# Patient Record
Sex: MALE | Race: BLACK OR AFRICAN AMERICAN | NOT HISPANIC OR LATINO | Employment: OTHER | RURAL
[De-identification: names, ages, dates, MRNs, and addresses within clinical notes are randomized per-mention and may not be internally consistent; named-entity substitution may affect disease eponyms.]

---

## 2020-07-02 ENCOUNTER — HISTORICAL (OUTPATIENT)
Dept: ADMINISTRATIVE | Facility: HOSPITAL | Age: 70
End: 2020-07-02

## 2021-06-25 VITALS — HEIGHT: 71 IN | WEIGHT: 182 LBS | BODY MASS INDEX: 25.48 KG/M2

## 2021-06-25 RX ORDER — CLOPIDOGREL BISULFATE 75 MG/1
75 TABLET ORAL DAILY
COMMUNITY
End: 2023-02-15 | Stop reason: SDUPTHER

## 2021-06-25 RX ORDER — ATORVASTATIN CALCIUM 40 MG/1
40 TABLET, FILM COATED ORAL DAILY
COMMUNITY
End: 2022-02-01

## 2021-06-25 RX ORDER — IBUPROFEN 100 MG/5ML
1000 SUSPENSION, ORAL (FINAL DOSE FORM) ORAL DAILY
COMMUNITY

## 2021-06-25 RX ORDER — METOPROLOL SUCCINATE 100 MG/1
100 TABLET, EXTENDED RELEASE ORAL DAILY
COMMUNITY
End: 2022-02-01

## 2021-06-25 RX ORDER — CILOSTAZOL 100 MG/1
100 TABLET ORAL 2 TIMES DAILY
COMMUNITY

## 2021-06-25 RX ORDER — FUROSEMIDE 40 MG/1
40 TABLET ORAL DAILY
COMMUNITY

## 2021-06-25 RX ORDER — HYDRALAZINE HYDROCHLORIDE 50 MG/1
50 TABLET, FILM COATED ORAL 3 TIMES DAILY
COMMUNITY
End: 2023-02-15 | Stop reason: SDUPTHER

## 2021-06-25 RX ORDER — LOSARTAN POTASSIUM 50 MG/1
100 TABLET ORAL DAILY
COMMUNITY
End: 2023-02-15 | Stop reason: SDUPTHER

## 2021-06-25 RX ORDER — ASPIRIN 81 MG/1
81 TABLET ORAL DAILY
COMMUNITY

## 2021-09-08 ENCOUNTER — APPOINTMENT (OUTPATIENT)
Dept: RADIOLOGY | Facility: CLINIC | Age: 71
End: 2021-09-08
Attending: INTERNAL MEDICINE
Payer: MEDICARE

## 2021-09-08 ENCOUNTER — OFFICE VISIT (OUTPATIENT)
Dept: FAMILY MEDICINE | Facility: CLINIC | Age: 71
End: 2021-09-08
Payer: MEDICARE

## 2021-09-08 VITALS
SYSTOLIC BLOOD PRESSURE: 192 MMHG | HEART RATE: 63 BPM | WEIGHT: 193 LBS | HEIGHT: 71 IN | BODY MASS INDEX: 27.02 KG/M2 | RESPIRATION RATE: 20 BRPM | OXYGEN SATURATION: 98 % | DIASTOLIC BLOOD PRESSURE: 74 MMHG

## 2021-09-08 DIAGNOSIS — M25.559 HIP PAIN: Primary | ICD-10-CM

## 2021-09-08 DIAGNOSIS — I10 HYPERTENSION, UNSPECIFIED TYPE: ICD-10-CM

## 2021-09-08 DIAGNOSIS — I49.9 CARDIAC ARRHYTHMIA, UNSPECIFIED CARDIAC ARRHYTHMIA TYPE: ICD-10-CM

## 2021-09-08 DIAGNOSIS — R10.9 ABDOMINAL PAIN, UNSPECIFIED ABDOMINAL LOCATION: ICD-10-CM

## 2021-09-08 LAB
ANION GAP SERPL CALCULATED.3IONS-SCNC: 11 MMOL/L (ref 7–16)
BASOPHILS # BLD AUTO: 0.03 K/UL (ref 0–0.2)
BASOPHILS NFR BLD AUTO: 0.5 % (ref 0–1)
BUN SERPL-MCNC: 21 MG/DL (ref 7–18)
BUN/CREAT SERPL: 12 (ref 6–20)
CALCIUM SERPL-MCNC: 9.6 MG/DL (ref 8.5–10.1)
CHLORIDE SERPL-SCNC: 105 MMOL/L (ref 98–107)
CO2 SERPL-SCNC: 27 MMOL/L (ref 21–32)
CREAT SERPL-MCNC: 1.78 MG/DL (ref 0.7–1.3)
DIFFERENTIAL METHOD BLD: ABNORMAL
EOSINOPHIL # BLD AUTO: 0.05 K/UL (ref 0–0.5)
EOSINOPHIL NFR BLD AUTO: 0.8 % (ref 1–4)
ERYTHROCYTE [DISTWIDTH] IN BLOOD BY AUTOMATED COUNT: 16.2 % (ref 11.5–14.5)
GLUCOSE SERPL-MCNC: 113 MG/DL (ref 74–106)
HCT VFR BLD AUTO: 36.6 % (ref 40–54)
HGB BLD-MCNC: 12.4 G/DL (ref 13.5–18)
IMM GRANULOCYTES # BLD AUTO: 0.02 K/UL (ref 0–0.04)
IMM GRANULOCYTES NFR BLD: 0.3 % (ref 0–0.4)
LYMPHOCYTES # BLD AUTO: 2.06 K/UL (ref 1–4.8)
LYMPHOCYTES NFR BLD AUTO: 34.9 % (ref 27–41)
MCH RBC QN AUTO: 26.5 PG (ref 27–31)
MCHC RBC AUTO-ENTMCNC: 33.9 G/DL (ref 32–36)
MCV RBC AUTO: 78.2 FL (ref 80–96)
MONOCYTES # BLD AUTO: 0.46 K/UL (ref 0–0.8)
MONOCYTES NFR BLD AUTO: 7.8 % (ref 2–6)
MPC BLD CALC-MCNC: 11.2 FL (ref 9.4–12.4)
NEUTROPHILS # BLD AUTO: 3.29 K/UL (ref 1.8–7.7)
NEUTROPHILS NFR BLD AUTO: 55.7 % (ref 53–65)
NRBC # BLD AUTO: 0 X10E3/UL
NRBC, AUTO (.00): 0 %
PLATELET # BLD AUTO: 299 K/UL (ref 150–400)
POTASSIUM SERPL-SCNC: 3.6 MMOL/L (ref 3.5–5.1)
RBC # BLD AUTO: 4.68 M/UL (ref 4.6–6.2)
SODIUM SERPL-SCNC: 139 MMOL/L (ref 136–145)
WBC # BLD AUTO: 5.91 K/UL (ref 4.5–11)

## 2021-09-08 PROCEDURE — 85025 CBC WITH DIFFERENTIAL: ICD-10-PCS | Mod: ,,, | Performed by: CLINICAL MEDICAL LABORATORY

## 2021-09-08 PROCEDURE — 3078F DIAST BP <80 MM HG: CPT | Mod: ,,, | Performed by: INTERNAL MEDICINE

## 2021-09-08 PROCEDURE — 99214 OFFICE O/P EST MOD 30 MIN: CPT | Mod: ,,, | Performed by: INTERNAL MEDICINE

## 2021-09-08 PROCEDURE — 4010F ACE/ARB THERAPY RXD/TAKEN: CPT | Mod: ,,, | Performed by: INTERNAL MEDICINE

## 2021-09-08 PROCEDURE — 3288F PR FALLS RISK ASSESSMENT DOCUMENTED: ICD-10-PCS | Mod: ,,, | Performed by: INTERNAL MEDICINE

## 2021-09-08 PROCEDURE — 80048 BASIC METABOLIC PNL TOTAL CA: CPT | Mod: ,,, | Performed by: CLINICAL MEDICAL LABORATORY

## 2021-09-08 PROCEDURE — 3077F PR MOST RECENT SYSTOLIC BLOOD PRESSURE >= 140 MM HG: ICD-10-PCS | Mod: ,,, | Performed by: INTERNAL MEDICINE

## 2021-09-08 PROCEDURE — 71046 X-RAY EXAM CHEST 2 VIEWS: CPT | Mod: TC,RHCUB | Performed by: INTERNAL MEDICINE

## 2021-09-08 PROCEDURE — 1101F PT FALLS ASSESS-DOCD LE1/YR: CPT | Mod: ,,, | Performed by: INTERNAL MEDICINE

## 2021-09-08 PROCEDURE — 1101F PR PT FALLS ASSESS DOC 0-1 FALLS W/OUT INJ PAST YR: ICD-10-PCS | Mod: ,,, | Performed by: INTERNAL MEDICINE

## 2021-09-08 PROCEDURE — 1159F PR MEDICATION LIST DOCUMENTED IN MEDICAL RECORD: ICD-10-PCS | Mod: ,,, | Performed by: INTERNAL MEDICINE

## 2021-09-08 PROCEDURE — 4010F PR ACE/ARB THEARPY RXD/TAKEN: ICD-10-PCS | Mod: ,,, | Performed by: INTERNAL MEDICINE

## 2021-09-08 PROCEDURE — 99214 PR OFFICE/OUTPT VISIT, EST, LEVL IV, 30-39 MIN: ICD-10-PCS | Mod: ,,, | Performed by: INTERNAL MEDICINE

## 2021-09-08 PROCEDURE — 3077F SYST BP >= 140 MM HG: CPT | Mod: ,,, | Performed by: INTERNAL MEDICINE

## 2021-09-08 PROCEDURE — 3288F FALL RISK ASSESSMENT DOCD: CPT | Mod: ,,, | Performed by: INTERNAL MEDICINE

## 2021-09-08 PROCEDURE — 1159F MED LIST DOCD IN RCRD: CPT | Mod: ,,, | Performed by: INTERNAL MEDICINE

## 2021-09-08 PROCEDURE — 80048 BASIC METABOLIC PANEL: ICD-10-PCS | Mod: ,,, | Performed by: CLINICAL MEDICAL LABORATORY

## 2021-09-08 PROCEDURE — 3078F PR MOST RECENT DIASTOLIC BLOOD PRESSURE < 80 MM HG: ICD-10-PCS | Mod: ,,, | Performed by: INTERNAL MEDICINE

## 2021-09-08 PROCEDURE — 3008F PR BODY MASS INDEX (BMI) DOCUMENTED: ICD-10-PCS | Mod: ,,, | Performed by: INTERNAL MEDICINE

## 2021-09-08 PROCEDURE — 3008F BODY MASS INDEX DOCD: CPT | Mod: ,,, | Performed by: INTERNAL MEDICINE

## 2021-09-08 PROCEDURE — 85025 COMPLETE CBC W/AUTO DIFF WBC: CPT | Mod: ,,, | Performed by: CLINICAL MEDICAL LABORATORY

## 2021-09-08 RX ORDER — AMLODIPINE BESYLATE 10 MG/1
10 TABLET ORAL DAILY
Qty: 30 TABLET | Refills: 3 | Status: SHIPPED | OUTPATIENT
Start: 2021-09-08 | End: 2022-01-20

## 2021-09-15 ENCOUNTER — OFFICE VISIT (OUTPATIENT)
Dept: FAMILY MEDICINE | Facility: CLINIC | Age: 71
End: 2021-09-15
Payer: MEDICARE

## 2021-09-15 VITALS
BODY MASS INDEX: 27.3 KG/M2 | RESPIRATION RATE: 20 BRPM | WEIGHT: 195 LBS | SYSTOLIC BLOOD PRESSURE: 151 MMHG | HEIGHT: 71 IN | DIASTOLIC BLOOD PRESSURE: 68 MMHG | OXYGEN SATURATION: 96 % | HEART RATE: 65 BPM

## 2021-09-15 DIAGNOSIS — N18.9 CHRONIC KIDNEY DISEASE, UNSPECIFIED CKD STAGE: ICD-10-CM

## 2021-09-15 DIAGNOSIS — M16.0 OSTEOARTHRITIS OF BOTH HIPS, UNSPECIFIED OSTEOARTHRITIS TYPE: Primary | ICD-10-CM

## 2021-09-15 DIAGNOSIS — Z12.11 ENCOUNTER FOR SCREENING COLONOSCOPY: ICD-10-CM

## 2021-09-15 DIAGNOSIS — J44.9 CHRONIC OBSTRUCTIVE PULMONARY DISEASE, UNSPECIFIED COPD TYPE: ICD-10-CM

## 2021-09-15 DIAGNOSIS — R89.9 ABNORMAL LABORATORY TEST RESULT: ICD-10-CM

## 2021-09-15 DIAGNOSIS — Z12.5 SCREENING FOR MALIGNANT NEOPLASM OF PROSTATE: ICD-10-CM

## 2021-09-15 LAB
FERRITIN SERPL-MCNC: 23 NG/ML (ref 26–388)
IMM RETICS NFR: 19.2 % (ref 2.3–13.4)
IRON SATN MFR SERPL: 21 % (ref 14–50)
IRON SERPL-MCNC: 64 ΜG/DL (ref 65–175)
PSA SERPL-MCNC: 1.94 NG/ML (ref 0–4.4)
RETICS # AUTO: 0.08 X10E6/UL (ref 0.02–0.11)
RETICS/RBC NFR AUTO: 1.9 % (ref 0.4–2.2)
TIBC SERPL-MCNC: 312 ΜG/DL (ref 250–450)

## 2021-09-15 PROCEDURE — 83540 IRON AND TIBC: ICD-10-PCS | Mod: ,,, | Performed by: CLINICAL MEDICAL LABORATORY

## 2021-09-15 PROCEDURE — 82728 FERRITIN: ICD-10-PCS | Mod: ,,, | Performed by: CLINICAL MEDICAL LABORATORY

## 2021-09-15 PROCEDURE — G0103 PSA, SCREENING: ICD-10-PCS | Mod: ,,, | Performed by: CLINICAL MEDICAL LABORATORY

## 2021-09-15 PROCEDURE — 3288F FALL RISK ASSESSMENT DOCD: CPT | Mod: ,,, | Performed by: INTERNAL MEDICINE

## 2021-09-15 PROCEDURE — 4010F ACE/ARB THERAPY RXD/TAKEN: CPT | Mod: ,,, | Performed by: INTERNAL MEDICINE

## 2021-09-15 PROCEDURE — 82728 ASSAY OF FERRITIN: CPT | Mod: ,,, | Performed by: CLINICAL MEDICAL LABORATORY

## 2021-09-15 PROCEDURE — 1159F MED LIST DOCD IN RCRD: CPT | Mod: ,,, | Performed by: INTERNAL MEDICINE

## 2021-09-15 PROCEDURE — 83550 IRON BINDING TEST: CPT | Mod: ,,, | Performed by: CLINICAL MEDICAL LABORATORY

## 2021-09-15 PROCEDURE — 1159F PR MEDICATION LIST DOCUMENTED IN MEDICAL RECORD: ICD-10-PCS | Mod: ,,, | Performed by: INTERNAL MEDICINE

## 2021-09-15 PROCEDURE — 85045 RETICULOCYTES: ICD-10-PCS | Mod: ,,, | Performed by: CLINICAL MEDICAL LABORATORY

## 2021-09-15 PROCEDURE — 3008F PR BODY MASS INDEX (BMI) DOCUMENTED: ICD-10-PCS | Mod: ,,, | Performed by: INTERNAL MEDICINE

## 2021-09-15 PROCEDURE — 83540 ASSAY OF IRON: CPT | Mod: ,,, | Performed by: CLINICAL MEDICAL LABORATORY

## 2021-09-15 PROCEDURE — 1101F PR PT FALLS ASSESS DOC 0-1 FALLS W/OUT INJ PAST YR: ICD-10-PCS | Mod: ,,, | Performed by: INTERNAL MEDICINE

## 2021-09-15 PROCEDURE — 3078F DIAST BP <80 MM HG: CPT | Mod: ,,, | Performed by: INTERNAL MEDICINE

## 2021-09-15 PROCEDURE — 3077F PR MOST RECENT SYSTOLIC BLOOD PRESSURE >= 140 MM HG: ICD-10-PCS | Mod: ,,, | Performed by: INTERNAL MEDICINE

## 2021-09-15 PROCEDURE — 4010F PR ACE/ARB THEARPY RXD/TAKEN: ICD-10-PCS | Mod: ,,, | Performed by: INTERNAL MEDICINE

## 2021-09-15 PROCEDURE — 3078F PR MOST RECENT DIASTOLIC BLOOD PRESSURE < 80 MM HG: ICD-10-PCS | Mod: ,,, | Performed by: INTERNAL MEDICINE

## 2021-09-15 PROCEDURE — 85045 AUTOMATED RETICULOCYTE COUNT: CPT | Mod: ,,, | Performed by: CLINICAL MEDICAL LABORATORY

## 2021-09-15 PROCEDURE — 3077F SYST BP >= 140 MM HG: CPT | Mod: ,,, | Performed by: INTERNAL MEDICINE

## 2021-09-15 PROCEDURE — G0103 PSA SCREENING: HCPCS | Mod: ,,, | Performed by: CLINICAL MEDICAL LABORATORY

## 2021-09-15 PROCEDURE — 1101F PT FALLS ASSESS-DOCD LE1/YR: CPT | Mod: ,,, | Performed by: INTERNAL MEDICINE

## 2021-09-15 PROCEDURE — 1160F PR REVIEW ALL MEDS BY PRESCRIBER/CLIN PHARMACIST DOCUMENTED: ICD-10-PCS | Mod: ,,, | Performed by: INTERNAL MEDICINE

## 2021-09-15 PROCEDURE — 1160F RVW MEDS BY RX/DR IN RCRD: CPT | Mod: ,,, | Performed by: INTERNAL MEDICINE

## 2021-09-15 PROCEDURE — 3008F BODY MASS INDEX DOCD: CPT | Mod: ,,, | Performed by: INTERNAL MEDICINE

## 2021-09-15 PROCEDURE — 99214 PR OFFICE/OUTPT VISIT, EST, LEVL IV, 30-39 MIN: ICD-10-PCS | Mod: ,,, | Performed by: INTERNAL MEDICINE

## 2021-09-15 PROCEDURE — 3288F PR FALLS RISK ASSESSMENT DOCUMENTED: ICD-10-PCS | Mod: ,,, | Performed by: INTERNAL MEDICINE

## 2021-09-15 PROCEDURE — 99214 OFFICE O/P EST MOD 30 MIN: CPT | Mod: ,,, | Performed by: INTERNAL MEDICINE

## 2021-09-15 PROCEDURE — 83550 IRON AND TIBC: ICD-10-PCS | Mod: ,,, | Performed by: CLINICAL MEDICAL LABORATORY

## 2021-09-22 ENCOUNTER — CLINICAL SUPPORT (OUTPATIENT)
Dept: PULMONOLOGY | Facility: HOSPITAL | Age: 71
End: 2021-09-22
Attending: INTERNAL MEDICINE
Payer: MEDICARE

## 2021-09-22 DIAGNOSIS — J44.9 CHRONIC OBSTRUCTIVE PULMONARY DISEASE, UNSPECIFIED COPD TYPE: ICD-10-CM

## 2021-09-22 LAB
OCCULT BLOOD, SPECIMEN 2: POSITIVE
OCCULT BLOOD, SPECIMEN 3: POSITIVE
OCCULT BLOOD: NEGATIVE

## 2021-09-22 PROCEDURE — 94726 PULM FUNCT TST PLETHYSMOGRAP: ICD-10-PCS | Mod: 26,,, | Performed by: INTERNAL MEDICINE

## 2021-09-22 PROCEDURE — 94060 EVALUATION OF WHEEZING: CPT

## 2021-09-22 PROCEDURE — 94729 DIFFUSING CAPACITY: CPT

## 2021-09-22 PROCEDURE — 94729 DIFFUSING CAPACITY: CPT | Mod: 26,,, | Performed by: INTERNAL MEDICINE

## 2021-09-22 PROCEDURE — 94060 PR EVAL OF BRONCHOSPASM: ICD-10-PCS | Mod: 26,,, | Performed by: INTERNAL MEDICINE

## 2021-09-22 PROCEDURE — 94060 EVALUATION OF WHEEZING: CPT | Mod: 26,,, | Performed by: INTERNAL MEDICINE

## 2021-09-22 PROCEDURE — 94726 PLETHYSMOGRAPHY LUNG VOLUMES: CPT

## 2021-09-22 PROCEDURE — 82271 OCCULT BLOOD X 3, STOOL: ICD-10-PCS | Mod: ,,, | Performed by: CLINICAL MEDICAL LABORATORY

## 2021-09-22 PROCEDURE — 94726 PLETHYSMOGRAPHY LUNG VOLUMES: CPT | Mod: 26,,, | Performed by: INTERNAL MEDICINE

## 2021-09-22 PROCEDURE — 82271 OCCULT BLOOD OTHER SOURCES: CPT | Mod: ,,, | Performed by: CLINICAL MEDICAL LABORATORY

## 2021-09-22 PROCEDURE — 94729 PR C02/MEMBANE DIFFUSE CAPACITY: ICD-10-PCS | Mod: 26,,, | Performed by: INTERNAL MEDICINE

## 2021-09-30 PROBLEM — M47.819 ARTHRITIS OF LOW BACK: Status: ACTIVE | Noted: 2021-09-30

## 2021-09-30 PROBLEM — M16.0 OSTEOARTHRITIS OF BOTH HIPS: Status: ACTIVE | Noted: 2021-09-30

## 2021-10-04 ENCOUNTER — OFFICE VISIT (OUTPATIENT)
Dept: FAMILY MEDICINE | Facility: CLINIC | Age: 71
End: 2021-10-04
Payer: MEDICARE

## 2021-10-04 VITALS
HEIGHT: 73 IN | RESPIRATION RATE: 19 BRPM | HEART RATE: 68 BPM | DIASTOLIC BLOOD PRESSURE: 68 MMHG | BODY MASS INDEX: 25.27 KG/M2 | OXYGEN SATURATION: 98 % | WEIGHT: 190.63 LBS | SYSTOLIC BLOOD PRESSURE: 159 MMHG

## 2021-10-04 DIAGNOSIS — Z23 ENCOUNTER FOR IMMUNIZATION: Primary | ICD-10-CM

## 2021-10-04 DIAGNOSIS — K92.2 GASTROINTESTINAL HEMORRHAGE, UNSPECIFIED GASTROINTESTINAL HEMORRHAGE TYPE: ICD-10-CM

## 2021-10-04 DIAGNOSIS — N45.1 EPIDIDYMITIS: Primary | ICD-10-CM

## 2021-10-04 LAB
BACTERIA #/AREA URNS HPF: ABNORMAL /HPF
BILIRUB UR QL STRIP: NEGATIVE
CLARITY UR: CLEAR
COLOR UR: YELLOW
GLUCOSE UR STRIP-MCNC: NEGATIVE MG/DL
KETONES UR STRIP-SCNC: NEGATIVE MG/DL
LEUKOCYTE ESTERASE UR QL STRIP: ABNORMAL
NITRITE UR QL STRIP: NEGATIVE
PH UR STRIP: 6.5 PH UNITS
PROT UR QL STRIP: NEGATIVE
RBC # UR STRIP: NEGATIVE /UL
RBC #/AREA URNS HPF: ABNORMAL /HPF
SP GR UR STRIP: 1.01
SQUAMOUS #/AREA URNS LPF: ABNORMAL /LPF
UROBILINOGEN UR STRIP-ACNC: 0.2 MG/DL
WBC #/AREA URNS HPF: ABNORMAL /HPF
WBC CLUMPS, UA: ABNORMAL /HPF

## 2021-10-04 PROCEDURE — 1101F PT FALLS ASSESS-DOCD LE1/YR: CPT | Mod: ,,, | Performed by: INTERNAL MEDICINE

## 2021-10-04 PROCEDURE — 3288F FALL RISK ASSESSMENT DOCD: CPT | Mod: ,,, | Performed by: INTERNAL MEDICINE

## 2021-10-04 PROCEDURE — 3288F PR FALLS RISK ASSESSMENT DOCUMENTED: ICD-10-PCS | Mod: ,,, | Performed by: INTERNAL MEDICINE

## 2021-10-04 PROCEDURE — 1160F RVW MEDS BY RX/DR IN RCRD: CPT | Mod: ,,, | Performed by: INTERNAL MEDICINE

## 2021-10-04 PROCEDURE — 3077F PR MOST RECENT SYSTOLIC BLOOD PRESSURE >= 140 MM HG: ICD-10-PCS | Mod: ,,, | Performed by: INTERNAL MEDICINE

## 2021-10-04 PROCEDURE — 1159F PR MEDICATION LIST DOCUMENTED IN MEDICAL RECORD: ICD-10-PCS | Mod: ,,, | Performed by: INTERNAL MEDICINE

## 2021-10-04 PROCEDURE — 96372 THER/PROPH/DIAG INJ SC/IM: CPT | Mod: ,,, | Performed by: INTERNAL MEDICINE

## 2021-10-04 PROCEDURE — 1159F MED LIST DOCD IN RCRD: CPT | Mod: ,,, | Performed by: INTERNAL MEDICINE

## 2021-10-04 PROCEDURE — 99214 PR OFFICE/OUTPT VISIT, EST, LEVL IV, 30-39 MIN: ICD-10-PCS | Mod: 25,,, | Performed by: INTERNAL MEDICINE

## 2021-10-04 PROCEDURE — 3078F PR MOST RECENT DIASTOLIC BLOOD PRESSURE < 80 MM HG: ICD-10-PCS | Mod: ,,, | Performed by: INTERNAL MEDICINE

## 2021-10-04 PROCEDURE — 3077F SYST BP >= 140 MM HG: CPT | Mod: ,,, | Performed by: INTERNAL MEDICINE

## 2021-10-04 PROCEDURE — 81001 URINALYSIS, REFLEX TO URINE CULTURE: ICD-10-PCS | Mod: ,,, | Performed by: CLINICAL MEDICAL LABORATORY

## 2021-10-04 PROCEDURE — 90686 IIV4 VACC NO PRSV 0.5 ML IM: CPT | Mod: ,,, | Performed by: INTERNAL MEDICINE

## 2021-10-04 PROCEDURE — 81001 URINALYSIS AUTO W/SCOPE: CPT | Mod: ,,, | Performed by: CLINICAL MEDICAL LABORATORY

## 2021-10-04 PROCEDURE — 96372 PR INJECTION,THERAP/PROPH/DIAG2ST, IM OR SUBCUT: ICD-10-PCS | Mod: ,,, | Performed by: INTERNAL MEDICINE

## 2021-10-04 PROCEDURE — 1101F PR PT FALLS ASSESS DOC 0-1 FALLS W/OUT INJ PAST YR: ICD-10-PCS | Mod: ,,, | Performed by: INTERNAL MEDICINE

## 2021-10-04 PROCEDURE — 90686 FLU VACCINE (QUAD) GREATER THAN OR EQUAL TO 3YO PRESERVATIVE FREE IM: ICD-10-PCS | Mod: ,,, | Performed by: INTERNAL MEDICINE

## 2021-10-04 PROCEDURE — 4010F PR ACE/ARB THEARPY RXD/TAKEN: ICD-10-PCS | Mod: ,,, | Performed by: INTERNAL MEDICINE

## 2021-10-04 PROCEDURE — 4010F ACE/ARB THERAPY RXD/TAKEN: CPT | Mod: ,,, | Performed by: INTERNAL MEDICINE

## 2021-10-04 PROCEDURE — 3008F BODY MASS INDEX DOCD: CPT | Mod: ,,, | Performed by: INTERNAL MEDICINE

## 2021-10-04 PROCEDURE — 1160F PR REVIEW ALL MEDS BY PRESCRIBER/CLIN PHARMACIST DOCUMENTED: ICD-10-PCS | Mod: ,,, | Performed by: INTERNAL MEDICINE

## 2021-10-04 PROCEDURE — G0008 FLU VACCINE (QUAD) GREATER THAN OR EQUAL TO 3YO PRESERVATIVE FREE IM: ICD-10-PCS | Mod: ,,, | Performed by: INTERNAL MEDICINE

## 2021-10-04 PROCEDURE — G0008 ADMIN INFLUENZA VIRUS VAC: HCPCS | Mod: ,,, | Performed by: INTERNAL MEDICINE

## 2021-10-04 PROCEDURE — 3008F PR BODY MASS INDEX (BMI) DOCUMENTED: ICD-10-PCS | Mod: ,,, | Performed by: INTERNAL MEDICINE

## 2021-10-04 PROCEDURE — 99214 OFFICE O/P EST MOD 30 MIN: CPT | Mod: 25,,, | Performed by: INTERNAL MEDICINE

## 2021-10-04 PROCEDURE — 3078F DIAST BP <80 MM HG: CPT | Mod: ,,, | Performed by: INTERNAL MEDICINE

## 2021-10-04 RX ORDER — CEFTRIAXONE 1 G/1
1 INJECTION, POWDER, FOR SOLUTION INTRAMUSCULAR; INTRAVENOUS
Status: COMPLETED | OUTPATIENT
Start: 2021-10-04 | End: 2021-10-04

## 2021-10-04 RX ORDER — SULFAMETHOXAZOLE AND TRIMETHOPRIM 800; 160 MG/1; MG/1
1 TABLET ORAL 2 TIMES DAILY
Qty: 20 TABLET | Refills: 1 | Status: SHIPPED | OUTPATIENT
Start: 2021-10-04 | End: 2021-11-29

## 2021-10-04 RX ORDER — NAPROXEN 500 MG/1
500 TABLET ORAL 2 TIMES DAILY PRN
Qty: 20 TABLET | Refills: 1 | Status: SHIPPED | OUTPATIENT
Start: 2021-10-04 | End: 2022-12-20

## 2021-10-04 RX ADMIN — CEFTRIAXONE 1 G: 1 INJECTION, POWDER, FOR SOLUTION INTRAMUSCULAR; INTRAVENOUS at 09:10

## 2021-10-06 ENCOUNTER — HOSPITAL ENCOUNTER (OUTPATIENT)
Dept: RADIOLOGY | Facility: HOSPITAL | Age: 71
Discharge: HOME | End: 2021-10-06
Attending: INTERNAL MEDICINE
Payer: MEDICARE

## 2021-10-06 ENCOUNTER — TELEPHONE (OUTPATIENT)
Dept: FAMILY MEDICINE | Facility: CLINIC | Age: 71
End: 2021-10-06

## 2021-10-06 ENCOUNTER — HOSPITAL ENCOUNTER (OUTPATIENT)
Dept: RADIOLOGY | Facility: HOSPITAL | Age: 71
Discharge: HOME | End: 2021-10-06
Attending: ORTHOPAEDIC SURGERY
Payer: MEDICARE

## 2021-10-06 DIAGNOSIS — N45.1 EPIDIDYMITIS: ICD-10-CM

## 2021-10-06 DIAGNOSIS — M54.9 DORSALGIA, UNSPECIFIED: ICD-10-CM

## 2021-10-06 DIAGNOSIS — I73.9 VASCULAR DISEASE, PERIPHERAL: ICD-10-CM

## 2021-10-06 PROCEDURE — 93925 US LOWER EXTREMITY ARTERIES BILATERAL: ICD-10-PCS | Mod: 26,,,

## 2021-10-06 PROCEDURE — 76870 US EXAM SCROTUM: CPT | Mod: 26,,,

## 2021-10-06 PROCEDURE — 76870 US SCROTUM AND TESTICLES: ICD-10-PCS | Mod: 26,,,

## 2021-10-06 PROCEDURE — 72148 MRI LUMBAR SPINE W/O DYE: CPT | Mod: 26,,,

## 2021-10-06 PROCEDURE — 93925 LOWER EXTREMITY STUDY: CPT | Mod: TC

## 2021-10-06 PROCEDURE — 72148 MRI LUMBAR SPINE W/O DYE: CPT | Mod: TC

## 2021-10-06 PROCEDURE — 76870 US EXAM SCROTUM: CPT | Mod: TC

## 2021-10-06 PROCEDURE — 72148 MRI LUMBAR SPINE WITHOUT CONTRAST: ICD-10-PCS | Mod: 26,,,

## 2021-10-06 PROCEDURE — 93925 LOWER EXTREMITY STUDY: CPT | Mod: 26,,,

## 2021-10-20 ENCOUNTER — OFFICE VISIT (OUTPATIENT)
Dept: FAMILY MEDICINE | Facility: CLINIC | Age: 71
End: 2021-10-20
Payer: MEDICARE

## 2021-10-20 VITALS
SYSTOLIC BLOOD PRESSURE: 153 MMHG | DIASTOLIC BLOOD PRESSURE: 67 MMHG | HEIGHT: 71 IN | RESPIRATION RATE: 20 BRPM | WEIGHT: 193.63 LBS | BODY MASS INDEX: 27.11 KG/M2 | HEART RATE: 65 BPM | OXYGEN SATURATION: 98 %

## 2021-10-20 DIAGNOSIS — N50.3 EPIDIDYMAL CYST: Primary | ICD-10-CM

## 2021-10-20 DIAGNOSIS — M16.0 OSTEOARTHRITIS OF BOTH HIPS, UNSPECIFIED OSTEOARTHRITIS TYPE: Chronic | ICD-10-CM

## 2021-10-20 DIAGNOSIS — N43.3 HYDROCELE OF TESTIS: ICD-10-CM

## 2021-10-20 PROCEDURE — 3078F DIAST BP <80 MM HG: CPT | Mod: ,,, | Performed by: INTERNAL MEDICINE

## 2021-10-20 PROCEDURE — 1160F PR REVIEW ALL MEDS BY PRESCRIBER/CLIN PHARMACIST DOCUMENTED: ICD-10-PCS | Mod: ,,, | Performed by: INTERNAL MEDICINE

## 2021-10-20 PROCEDURE — 4010F PR ACE/ARB THEARPY RXD/TAKEN: ICD-10-PCS | Mod: ,,, | Performed by: INTERNAL MEDICINE

## 2021-10-20 PROCEDURE — 3288F PR FALLS RISK ASSESSMENT DOCUMENTED: ICD-10-PCS | Mod: ,,, | Performed by: INTERNAL MEDICINE

## 2021-10-20 PROCEDURE — 1101F PR PT FALLS ASSESS DOC 0-1 FALLS W/OUT INJ PAST YR: ICD-10-PCS | Mod: ,,, | Performed by: INTERNAL MEDICINE

## 2021-10-20 PROCEDURE — 3077F PR MOST RECENT SYSTOLIC BLOOD PRESSURE >= 140 MM HG: ICD-10-PCS | Mod: ,,, | Performed by: INTERNAL MEDICINE

## 2021-10-20 PROCEDURE — 1159F PR MEDICATION LIST DOCUMENTED IN MEDICAL RECORD: ICD-10-PCS | Mod: ,,, | Performed by: INTERNAL MEDICINE

## 2021-10-20 PROCEDURE — 1160F RVW MEDS BY RX/DR IN RCRD: CPT | Mod: ,,, | Performed by: INTERNAL MEDICINE

## 2021-10-20 PROCEDURE — 1101F PT FALLS ASSESS-DOCD LE1/YR: CPT | Mod: ,,, | Performed by: INTERNAL MEDICINE

## 2021-10-20 PROCEDURE — 3078F PR MOST RECENT DIASTOLIC BLOOD PRESSURE < 80 MM HG: ICD-10-PCS | Mod: ,,, | Performed by: INTERNAL MEDICINE

## 2021-10-20 PROCEDURE — 3288F FALL RISK ASSESSMENT DOCD: CPT | Mod: ,,, | Performed by: INTERNAL MEDICINE

## 2021-10-20 PROCEDURE — 99214 OFFICE O/P EST MOD 30 MIN: CPT | Mod: ,,, | Performed by: INTERNAL MEDICINE

## 2021-10-20 PROCEDURE — 3008F BODY MASS INDEX DOCD: CPT | Mod: ,,, | Performed by: INTERNAL MEDICINE

## 2021-10-20 PROCEDURE — 99214 PR OFFICE/OUTPT VISIT, EST, LEVL IV, 30-39 MIN: ICD-10-PCS | Mod: ,,, | Performed by: INTERNAL MEDICINE

## 2021-10-20 PROCEDURE — 3008F PR BODY MASS INDEX (BMI) DOCUMENTED: ICD-10-PCS | Mod: ,,, | Performed by: INTERNAL MEDICINE

## 2021-10-20 PROCEDURE — 4010F ACE/ARB THERAPY RXD/TAKEN: CPT | Mod: ,,, | Performed by: INTERNAL MEDICINE

## 2021-10-20 PROCEDURE — 3077F SYST BP >= 140 MM HG: CPT | Mod: ,,, | Performed by: INTERNAL MEDICINE

## 2021-10-20 PROCEDURE — 1159F MED LIST DOCD IN RCRD: CPT | Mod: ,,, | Performed by: INTERNAL MEDICINE

## 2021-10-26 DIAGNOSIS — Z12.11 COLON CANCER SCREENING: Primary | ICD-10-CM

## 2021-11-01 ENCOUNTER — OFFICE VISIT (OUTPATIENT)
Dept: FAMILY MEDICINE | Facility: CLINIC | Age: 71
End: 2021-11-01
Payer: MEDICARE

## 2021-11-01 VITALS
OXYGEN SATURATION: 97 % | DIASTOLIC BLOOD PRESSURE: 73 MMHG | SYSTOLIC BLOOD PRESSURE: 170 MMHG | BODY MASS INDEX: 26.74 KG/M2 | HEART RATE: 63 BPM | WEIGHT: 191 LBS | HEIGHT: 71 IN | RESPIRATION RATE: 19 BRPM

## 2021-11-01 DIAGNOSIS — I73.9 PVD (PERIPHERAL VASCULAR DISEASE): Primary | ICD-10-CM

## 2021-11-01 DIAGNOSIS — M51.27 PROTRUSION OF INTERVERTEBRAL DISC OF LUMBOSACRAL REGION: ICD-10-CM

## 2021-11-01 PROCEDURE — 3078F DIAST BP <80 MM HG: CPT | Mod: ,,, | Performed by: INTERNAL MEDICINE

## 2021-11-01 PROCEDURE — 1101F PT FALLS ASSESS-DOCD LE1/YR: CPT | Mod: ,,, | Performed by: INTERNAL MEDICINE

## 2021-11-01 PROCEDURE — 4010F PR ACE/ARB THEARPY RXD/TAKEN: ICD-10-PCS | Mod: ,,, | Performed by: INTERNAL MEDICINE

## 2021-11-01 PROCEDURE — 3077F PR MOST RECENT SYSTOLIC BLOOD PRESSURE >= 140 MM HG: ICD-10-PCS | Mod: ,,, | Performed by: INTERNAL MEDICINE

## 2021-11-01 PROCEDURE — 1159F PR MEDICATION LIST DOCUMENTED IN MEDICAL RECORD: ICD-10-PCS | Mod: ,,, | Performed by: INTERNAL MEDICINE

## 2021-11-01 PROCEDURE — 4010F ACE/ARB THERAPY RXD/TAKEN: CPT | Mod: ,,, | Performed by: INTERNAL MEDICINE

## 2021-11-01 PROCEDURE — 99214 OFFICE O/P EST MOD 30 MIN: CPT | Mod: ,,, | Performed by: INTERNAL MEDICINE

## 2021-11-01 PROCEDURE — 3008F PR BODY MASS INDEX (BMI) DOCUMENTED: ICD-10-PCS | Mod: ,,, | Performed by: INTERNAL MEDICINE

## 2021-11-01 PROCEDURE — 3008F BODY MASS INDEX DOCD: CPT | Mod: ,,, | Performed by: INTERNAL MEDICINE

## 2021-11-01 PROCEDURE — 3077F SYST BP >= 140 MM HG: CPT | Mod: ,,, | Performed by: INTERNAL MEDICINE

## 2021-11-01 PROCEDURE — 3078F PR MOST RECENT DIASTOLIC BLOOD PRESSURE < 80 MM HG: ICD-10-PCS | Mod: ,,, | Performed by: INTERNAL MEDICINE

## 2021-11-01 PROCEDURE — 3288F FALL RISK ASSESSMENT DOCD: CPT | Mod: ,,, | Performed by: INTERNAL MEDICINE

## 2021-11-01 PROCEDURE — 1160F RVW MEDS BY RX/DR IN RCRD: CPT | Mod: ,,, | Performed by: INTERNAL MEDICINE

## 2021-11-01 PROCEDURE — 1159F MED LIST DOCD IN RCRD: CPT | Mod: ,,, | Performed by: INTERNAL MEDICINE

## 2021-11-01 PROCEDURE — 99214 PR OFFICE/OUTPT VISIT, EST, LEVL IV, 30-39 MIN: ICD-10-PCS | Mod: ,,, | Performed by: INTERNAL MEDICINE

## 2021-11-01 PROCEDURE — 1101F PR PT FALLS ASSESS DOC 0-1 FALLS W/OUT INJ PAST YR: ICD-10-PCS | Mod: ,,, | Performed by: INTERNAL MEDICINE

## 2021-11-01 PROCEDURE — 1160F PR REVIEW ALL MEDS BY PRESCRIBER/CLIN PHARMACIST DOCUMENTED: ICD-10-PCS | Mod: ,,, | Performed by: INTERNAL MEDICINE

## 2021-11-01 PROCEDURE — 3288F PR FALLS RISK ASSESSMENT DOCUMENTED: ICD-10-PCS | Mod: ,,, | Performed by: INTERNAL MEDICINE

## 2021-11-29 ENCOUNTER — ANESTHESIA EVENT (OUTPATIENT)
Dept: GASTROENTEROLOGY | Facility: HOSPITAL | Age: 71
End: 2021-11-29
Payer: MEDICARE

## 2021-11-29 ENCOUNTER — HOSPITAL ENCOUNTER (OUTPATIENT)
Dept: GASTROENTEROLOGY | Facility: HOSPITAL | Age: 71
Discharge: HOME | End: 2021-11-29
Attending: INTERNAL MEDICINE
Payer: MEDICARE

## 2021-11-29 ENCOUNTER — ANESTHESIA (OUTPATIENT)
Dept: GASTROENTEROLOGY | Facility: HOSPITAL | Age: 71
End: 2021-11-29
Payer: MEDICARE

## 2021-11-29 VITALS
HEART RATE: 55 BPM | DIASTOLIC BLOOD PRESSURE: 80 MMHG | SYSTOLIC BLOOD PRESSURE: 202 MMHG | RESPIRATION RATE: 14 BRPM | TEMPERATURE: 98 F | BODY MASS INDEX: 27.62 KG/M2 | WEIGHT: 198 LBS | OXYGEN SATURATION: 98 %

## 2021-11-29 DIAGNOSIS — K63.5 POLYP OF SPLENIC FLEXURE OF COLON: ICD-10-CM

## 2021-11-29 DIAGNOSIS — D12.3 ADENOMATOUS POLYP OF TRANSVERSE COLON: ICD-10-CM

## 2021-11-29 DIAGNOSIS — M47.816 LUMBAR SPONDYLOSIS: Primary | ICD-10-CM

## 2021-11-29 DIAGNOSIS — D50.0 IRON DEFICIENCY ANEMIA DUE TO CHRONIC BLOOD LOSS: ICD-10-CM

## 2021-11-29 DIAGNOSIS — D12.2 ADENOMATOUS POLYP OF ASCENDING COLON: ICD-10-CM

## 2021-11-29 DIAGNOSIS — D12.4 ADENOMATOUS POLYP OF DESCENDING COLON: ICD-10-CM

## 2021-11-29 DIAGNOSIS — Z12.11 SCREENING FOR COLON CANCER: ICD-10-CM

## 2021-11-29 DIAGNOSIS — Z12.11 COLON CANCER SCREENING: ICD-10-CM

## 2021-11-29 DIAGNOSIS — K92.1 BLOOD IN STOOL: ICD-10-CM

## 2021-11-29 PROCEDURE — 63600175 PHARM REV CODE 636 W HCPCS

## 2021-11-29 PROCEDURE — 25000003 PHARM REV CODE 250

## 2021-11-29 PROCEDURE — 37000008 HC ANESTHESIA 1ST 15 MINUTES

## 2021-11-29 PROCEDURE — C1889 IMPLANT/INSERT DEVICE, NOC: HCPCS

## 2021-11-29 PROCEDURE — 37000009 HC ANESTHESIA EA ADD 15 MINS

## 2021-11-29 PROCEDURE — D9220A PRA ANESTHESIA: ICD-10-PCS | Mod: PT,,,

## 2021-11-29 PROCEDURE — 45380 COLONOSCOPY AND BIOPSY: CPT | Mod: 59,PT

## 2021-11-29 PROCEDURE — 88305 TISSUE EXAM BY PATHOLOGIST: CPT | Mod: SUR | Performed by: INTERNAL MEDICINE

## 2021-11-29 PROCEDURE — 27201423 OPTIME MED/SURG SUP & DEVICES STERILE SUPPLY

## 2021-11-29 PROCEDURE — 25000003 PHARM REV CODE 250: Performed by: INTERNAL MEDICINE

## 2021-11-29 PROCEDURE — 88305 TISSUE EXAM BY PATHOLOGIST: CPT | Mod: 26,,, | Performed by: PATHOLOGY

## 2021-11-29 PROCEDURE — 88305 SURGICAL PATHOLOGY: ICD-10-PCS | Mod: 26,,, | Performed by: PATHOLOGY

## 2021-11-29 PROCEDURE — 45385 COLONOSCOPY W/LESION REMOVAL: CPT | Mod: PT

## 2021-11-29 PROCEDURE — D9220A PRA ANESTHESIA: Mod: PT,,,

## 2021-11-29 RX ORDER — PROPOFOL 10 MG/ML
VIAL (ML) INTRAVENOUS
Status: DISCONTINUED | OUTPATIENT
Start: 2021-11-29 | End: 2021-11-29

## 2021-11-29 RX ORDER — SODIUM CHLORIDE 9 MG/ML
INJECTION, SOLUTION INTRAVENOUS CONTINUOUS PRN
Status: DISCONTINUED | OUTPATIENT
Start: 2021-11-29 | End: 2021-11-29

## 2021-11-29 RX ORDER — SODIUM CHLORIDE 0.9 % (FLUSH) 0.9 %
10 SYRINGE (ML) INJECTION
Status: CANCELLED | OUTPATIENT
Start: 2021-11-29

## 2021-11-29 RX ORDER — LIDOCAINE HYDROCHLORIDE 20 MG/ML
INJECTION, SOLUTION EPIDURAL; INFILTRATION; INTRACAUDAL; PERINEURAL
Status: DISCONTINUED | OUTPATIENT
Start: 2021-11-29 | End: 2021-11-29

## 2021-11-29 RX ORDER — LABETALOL HYDROCHLORIDE 5 MG/ML
INJECTION, SOLUTION INTRAVENOUS
Status: DISCONTINUED | OUTPATIENT
Start: 2021-11-29 | End: 2021-11-29

## 2021-11-29 RX ADMIN — PROPOFOL 60 MG: 10 INJECTION, EMULSION INTRAVENOUS at 12:11

## 2021-11-29 RX ADMIN — NITROGLYCERIN 0.5 INCH: 20 OINTMENT TOPICAL at 11:11

## 2021-11-29 RX ADMIN — LABETALOL HYDROCHLORIDE 5 MG: 5 INJECTION, SOLUTION INTRAVENOUS at 12:11

## 2021-11-29 RX ADMIN — PROPOFOL 30 MG: 10 INJECTION, EMULSION INTRAVENOUS at 01:11

## 2021-11-29 RX ADMIN — SODIUM CHLORIDE: 9 INJECTION, SOLUTION INTRAVENOUS at 12:11

## 2021-11-29 RX ADMIN — PROPOFOL 30 MG: 10 INJECTION, EMULSION INTRAVENOUS at 12:11

## 2021-11-29 RX ADMIN — LIDOCAINE HYDROCHLORIDE 100 MG: 20 INJECTION, SOLUTION EPIDURAL; INFILTRATION; INTRACAUDAL; PERINEURAL at 12:11

## 2021-11-29 RX ADMIN — PROPOFOL 40 MG: 10 INJECTION, EMULSION INTRAVENOUS at 12:11

## 2021-11-30 LAB
ESTROGEN SERPL-MCNC: NORMAL PG/ML
LAB AP GROSS DESCRIPTION: NORMAL
LAB AP LABORATORY NOTES: NORMAL
T3RU NFR SERPL: NORMAL %

## 2021-12-01 ENCOUNTER — TELEPHONE (OUTPATIENT)
Dept: GASTROENTEROLOGY | Facility: CLINIC | Age: 71
End: 2021-12-01
Payer: MEDICARE

## 2021-12-01 ENCOUNTER — OFFICE VISIT (OUTPATIENT)
Dept: SPINE | Facility: CLINIC | Age: 71
End: 2021-12-01
Payer: MEDICARE

## 2021-12-01 ENCOUNTER — OFFICE VISIT (OUTPATIENT)
Dept: SURGERY | Facility: CLINIC | Age: 71
End: 2021-12-01
Payer: MEDICARE

## 2021-12-01 ENCOUNTER — HOSPITAL ENCOUNTER (OUTPATIENT)
Dept: RADIOLOGY | Facility: HOSPITAL | Age: 71
Discharge: HOME | End: 2021-12-01
Attending: ORTHOPAEDIC SURGERY
Payer: MEDICARE

## 2021-12-01 VITALS — BODY MASS INDEX: 27.72 KG/M2 | OXYGEN SATURATION: 99 % | HEART RATE: 87 BPM | WEIGHT: 198 LBS | HEIGHT: 71 IN

## 2021-12-01 DIAGNOSIS — M25.559 HIP PAIN: ICD-10-CM

## 2021-12-01 DIAGNOSIS — M51.27 PROTRUSION OF INTERVERTEBRAL DISC OF LUMBOSACRAL REGION: ICD-10-CM

## 2021-12-01 DIAGNOSIS — I73.9 PVD (PERIPHERAL VASCULAR DISEASE): Primary | ICD-10-CM

## 2021-12-01 DIAGNOSIS — M47.816 LUMBAR SPONDYLOSIS: ICD-10-CM

## 2021-12-01 DIAGNOSIS — M54.50 LOW BACK PAIN, UNSPECIFIED BACK PAIN LATERALITY, UNSPECIFIED CHRONICITY, UNSPECIFIED WHETHER SCIATICA PRESENT: ICD-10-CM

## 2021-12-01 DIAGNOSIS — M54.16 LUMBAR RADICULOPATHY: Primary | ICD-10-CM

## 2021-12-01 PROCEDURE — 99204 OFFICE O/P NEW MOD 45 MIN: CPT | Mod: 25,S$PBB,, | Performed by: ORTHOPAEDIC SURGERY

## 2021-12-01 PROCEDURE — 4010F ACE/ARB THERAPY RXD/TAKEN: CPT | Mod: CPTII,,, | Performed by: SURGERY

## 2021-12-01 PROCEDURE — 4010F PR ACE/ARB THEARPY RXD/TAKEN: ICD-10-PCS | Mod: CPTII,,, | Performed by: SURGERY

## 2021-12-01 PROCEDURE — 4010F PR ACE/ARB THEARPY RXD/TAKEN: ICD-10-PCS | Mod: CPTII,,, | Performed by: ORTHOPAEDIC SURGERY

## 2021-12-01 PROCEDURE — 99406 PR TOBACCO USE CESSATION INTERMEDIATE 3-10 MINUTES: ICD-10-PCS | Mod: S$PBB,,, | Performed by: ORTHOPAEDIC SURGERY

## 2021-12-01 PROCEDURE — 99213 OFFICE O/P EST LOW 20 MIN: CPT | Mod: PBBFAC,27 | Performed by: ORTHOPAEDIC SURGERY

## 2021-12-01 PROCEDURE — 99204 PR OFFICE/OUTPT VISIT, NEW, LEVL IV, 45-59 MIN: ICD-10-PCS | Mod: 25,S$PBB,, | Performed by: ORTHOPAEDIC SURGERY

## 2021-12-01 PROCEDURE — 99406 BEHAV CHNG SMOKING 3-10 MIN: CPT | Mod: S$PBB,,, | Performed by: ORTHOPAEDIC SURGERY

## 2021-12-01 PROCEDURE — 72110 X-RAY EXAM L-2 SPINE 4/>VWS: CPT | Mod: 26,,, | Performed by: ORTHOPAEDIC SURGERY

## 2021-12-01 PROCEDURE — 4010F ACE/ARB THERAPY RXD/TAKEN: CPT | Mod: CPTII,,, | Performed by: ORTHOPAEDIC SURGERY

## 2021-12-01 PROCEDURE — 99202 OFFICE O/P NEW SF 15 MIN: CPT | Mod: S$PBB,,, | Performed by: SURGERY

## 2021-12-01 PROCEDURE — 72110 X-RAY EXAM L-2 SPINE 4/>VWS: CPT | Mod: TC

## 2021-12-01 PROCEDURE — 99215 OFFICE O/P EST HI 40 MIN: CPT | Mod: PBBFAC | Performed by: SURGERY

## 2021-12-01 PROCEDURE — 99202 PR OFFICE/OUTPT VISIT, NEW, LEVL II, 15-29 MIN: ICD-10-PCS | Mod: S$PBB,,, | Performed by: SURGERY

## 2021-12-01 PROCEDURE — 72110 XR LUMBAR SPINE 5 VIEW WITH FLEX AND EXT: ICD-10-PCS | Mod: 26,,, | Performed by: ORTHOPAEDIC SURGERY

## 2021-12-01 RX ORDER — AMIODARONE HYDROCHLORIDE 200 MG/1
TABLET ORAL DAILY
COMMUNITY

## 2021-12-02 DIAGNOSIS — R32 URINARY INCONTINENCE, UNSPECIFIED TYPE: Primary | ICD-10-CM

## 2021-12-08 ENCOUNTER — OFFICE VISIT (OUTPATIENT)
Dept: SURGERY | Facility: CLINIC | Age: 71
End: 2021-12-08
Payer: MEDICARE

## 2021-12-08 ENCOUNTER — HOSPITAL ENCOUNTER (OUTPATIENT)
Dept: RADIOLOGY | Facility: HOSPITAL | Age: 71
Discharge: HOME | End: 2021-12-08
Attending: SURGERY
Payer: MEDICARE

## 2021-12-08 VITALS — HEART RATE: 64 BPM | WEIGHT: 198 LBS | HEIGHT: 71 IN | BODY MASS INDEX: 27.72 KG/M2 | OXYGEN SATURATION: 97 %

## 2021-12-08 DIAGNOSIS — I73.9 PVD (PERIPHERAL VASCULAR DISEASE): ICD-10-CM

## 2021-12-08 DIAGNOSIS — I73.9 CLAUDICATION: Primary | ICD-10-CM

## 2021-12-08 DIAGNOSIS — I73.9 PERIPHERAL VASCULAR DISEASE, UNSPECIFIED: ICD-10-CM

## 2021-12-08 PROCEDURE — 4010F PR ACE/ARB THEARPY RXD/TAKEN: ICD-10-PCS | Mod: CPTII,,, | Performed by: SURGERY

## 2021-12-08 PROCEDURE — 4010F ACE/ARB THERAPY RXD/TAKEN: CPT | Mod: CPTII,,, | Performed by: SURGERY

## 2021-12-08 PROCEDURE — 99213 OFFICE O/P EST LOW 20 MIN: CPT | Mod: S$PBB,,, | Performed by: SURGERY

## 2021-12-08 PROCEDURE — 99214 OFFICE O/P EST MOD 30 MIN: CPT | Mod: PBBFAC | Performed by: SURGERY

## 2021-12-08 PROCEDURE — 99213 PR OFFICE/OUTPT VISIT, EST, LEVL III, 20-29 MIN: ICD-10-PCS | Mod: S$PBB,,, | Performed by: SURGERY

## 2021-12-15 ENCOUNTER — HOSPITAL ENCOUNTER (OUTPATIENT)
Dept: RADIOLOGY | Facility: HOSPITAL | Age: 71
Discharge: HOME | End: 2021-12-15
Attending: SURGERY
Payer: MEDICARE

## 2021-12-15 DIAGNOSIS — I73.9 PERIPHERAL VASCULAR DISEASE, UNSPECIFIED: ICD-10-CM

## 2021-12-15 PROCEDURE — 75635 CTA RUNOFF ABD PEL BILAT LOWER EXT: ICD-10-PCS | Mod: 26,,, | Performed by: RADIOLOGY

## 2021-12-15 PROCEDURE — 75635 CT ANGIO ABDOMINAL ARTERIES: CPT | Mod: TC

## 2021-12-15 PROCEDURE — 25500020 PHARM REV CODE 255: Performed by: SURGERY

## 2021-12-15 PROCEDURE — 75635 CT ANGIO ABDOMINAL ARTERIES: CPT | Mod: 26,,, | Performed by: RADIOLOGY

## 2021-12-15 RX ADMIN — IOPAMIDOL 100 ML: 755 INJECTION, SOLUTION INTRAVENOUS at 09:12

## 2021-12-22 ENCOUNTER — OFFICE VISIT (OUTPATIENT)
Dept: SURGERY | Facility: CLINIC | Age: 71
End: 2021-12-22
Payer: MEDICARE

## 2021-12-22 ENCOUNTER — HOSPITAL ENCOUNTER (OUTPATIENT)
Dept: RADIOLOGY | Facility: HOSPITAL | Age: 71
Discharge: HOME | End: 2021-12-22
Attending: SURGERY
Payer: MEDICARE

## 2021-12-22 VITALS — BODY MASS INDEX: 27.72 KG/M2 | HEIGHT: 71 IN | WEIGHT: 198 LBS

## 2021-12-22 DIAGNOSIS — I73.9 PVD (PERIPHERAL VASCULAR DISEASE): Primary | ICD-10-CM

## 2021-12-22 DIAGNOSIS — M25.552 LEFT HIP PAIN: ICD-10-CM

## 2021-12-22 PROCEDURE — 99212 OFFICE O/P EST SF 10 MIN: CPT | Mod: S$PBB,,, | Performed by: SURGERY

## 2021-12-22 PROCEDURE — 73502 X-RAY EXAM HIP UNI 2-3 VIEWS: CPT | Mod: 26,LT,, | Performed by: RADIOLOGY

## 2021-12-22 PROCEDURE — 73502 XR HIP WITH PELVIS WHEN PERFORMED, 2 OR 3 VIEWS LEFT: ICD-10-PCS | Mod: 26,LT,, | Performed by: RADIOLOGY

## 2021-12-22 PROCEDURE — 73502 X-RAY EXAM HIP UNI 2-3 VIEWS: CPT | Mod: TC,LT

## 2021-12-22 PROCEDURE — 4010F PR ACE/ARB THEARPY RXD/TAKEN: ICD-10-PCS | Mod: CPTII,,, | Performed by: SURGERY

## 2021-12-22 PROCEDURE — 99212 PR OFFICE/OUTPT VISIT, EST, LEVL II, 10-19 MIN: ICD-10-PCS | Mod: S$PBB,,, | Performed by: SURGERY

## 2021-12-22 PROCEDURE — 4010F ACE/ARB THERAPY RXD/TAKEN: CPT | Mod: CPTII,,, | Performed by: SURGERY

## 2021-12-22 PROCEDURE — 99214 OFFICE O/P EST MOD 30 MIN: CPT | Mod: PBBFAC | Performed by: SURGERY

## 2022-01-20 ENCOUNTER — OFFICE VISIT (OUTPATIENT)
Dept: UROLOGY | Facility: CLINIC | Age: 72
End: 2022-01-20
Payer: MEDICARE

## 2022-01-20 VITALS
WEIGHT: 185 LBS | DIASTOLIC BLOOD PRESSURE: 60 MMHG | OXYGEN SATURATION: 96 % | TEMPERATURE: 98 F | HEART RATE: 88 BPM | SYSTOLIC BLOOD PRESSURE: 110 MMHG | BODY MASS INDEX: 25.9 KG/M2 | HEIGHT: 71 IN

## 2022-01-20 DIAGNOSIS — R32 URINARY INCONTINENCE, UNSPECIFIED TYPE: ICD-10-CM

## 2022-01-20 DIAGNOSIS — N40.1 BPH WITH OBSTRUCTION/LOWER URINARY TRACT SYMPTOMS: ICD-10-CM

## 2022-01-20 DIAGNOSIS — N45.2 ORCHITIS: Primary | ICD-10-CM

## 2022-01-20 DIAGNOSIS — N13.8 BPH WITH OBSTRUCTION/LOWER URINARY TRACT SYMPTOMS: ICD-10-CM

## 2022-01-20 PROCEDURE — 1101F PR PT FALLS ASSESS DOC 0-1 FALLS W/OUT INJ PAST YR: ICD-10-PCS | Mod: CPTII,,, | Performed by: NURSE PRACTITIONER

## 2022-01-20 PROCEDURE — 3078F DIAST BP <80 MM HG: CPT | Mod: CPTII,,, | Performed by: NURSE PRACTITIONER

## 2022-01-20 PROCEDURE — 3074F PR MOST RECENT SYSTOLIC BLOOD PRESSURE < 130 MM HG: ICD-10-PCS | Mod: CPTII,,, | Performed by: NURSE PRACTITIONER

## 2022-01-20 PROCEDURE — 3008F PR BODY MASS INDEX (BMI) DOCUMENTED: ICD-10-PCS | Mod: CPTII,,, | Performed by: NURSE PRACTITIONER

## 2022-01-20 PROCEDURE — 87086 CULTURE, URINE: ICD-10-PCS | Mod: ,,, | Performed by: CLINICAL MEDICAL LABORATORY

## 2022-01-20 PROCEDURE — 87086 URINE CULTURE/COLONY COUNT: CPT | Mod: ,,, | Performed by: CLINICAL MEDICAL LABORATORY

## 2022-01-20 PROCEDURE — 3074F SYST BP LT 130 MM HG: CPT | Mod: CPTII,,, | Performed by: NURSE PRACTITIONER

## 2022-01-20 PROCEDURE — 3008F BODY MASS INDEX DOCD: CPT | Mod: CPTII,,, | Performed by: NURSE PRACTITIONER

## 2022-01-20 PROCEDURE — 1101F PT FALLS ASSESS-DOCD LE1/YR: CPT | Mod: CPTII,,, | Performed by: NURSE PRACTITIONER

## 2022-01-20 PROCEDURE — 99214 PR OFFICE/OUTPT VISIT, EST, LEVL IV, 30-39 MIN: ICD-10-PCS | Mod: S$PBB,,, | Performed by: NURSE PRACTITIONER

## 2022-01-20 PROCEDURE — 1160F RVW MEDS BY RX/DR IN RCRD: CPT | Mod: CPTII,,, | Performed by: NURSE PRACTITIONER

## 2022-01-20 PROCEDURE — 1160F PR REVIEW ALL MEDS BY PRESCRIBER/CLIN PHARMACIST DOCUMENTED: ICD-10-PCS | Mod: CPTII,,, | Performed by: NURSE PRACTITIONER

## 2022-01-20 PROCEDURE — 1159F PR MEDICATION LIST DOCUMENTED IN MEDICAL RECORD: ICD-10-PCS | Mod: CPTII,,, | Performed by: NURSE PRACTITIONER

## 2022-01-20 PROCEDURE — 3288F PR FALLS RISK ASSESSMENT DOCUMENTED: ICD-10-PCS | Mod: CPTII,,, | Performed by: NURSE PRACTITIONER

## 2022-01-20 PROCEDURE — 1126F AMNT PAIN NOTED NONE PRSNT: CPT | Mod: CPTII,,, | Performed by: NURSE PRACTITIONER

## 2022-01-20 PROCEDURE — 3288F FALL RISK ASSESSMENT DOCD: CPT | Mod: CPTII,,, | Performed by: NURSE PRACTITIONER

## 2022-01-20 PROCEDURE — 51798 US URINE CAPACITY MEASURE: CPT | Mod: PBBFAC | Performed by: NURSE PRACTITIONER

## 2022-01-20 PROCEDURE — 99215 OFFICE O/P EST HI 40 MIN: CPT | Mod: PBBFAC | Performed by: NURSE PRACTITIONER

## 2022-01-20 PROCEDURE — 1126F PR PAIN SEVERITY QUANTIFIED, NO PAIN PRESENT: ICD-10-PCS | Mod: CPTII,,, | Performed by: NURSE PRACTITIONER

## 2022-01-20 PROCEDURE — 3078F PR MOST RECENT DIASTOLIC BLOOD PRESSURE < 80 MM HG: ICD-10-PCS | Mod: CPTII,,, | Performed by: NURSE PRACTITIONER

## 2022-01-20 PROCEDURE — 99214 OFFICE O/P EST MOD 30 MIN: CPT | Mod: S$PBB,,, | Performed by: NURSE PRACTITIONER

## 2022-01-20 PROCEDURE — 1159F MED LIST DOCD IN RCRD: CPT | Mod: CPTII,,, | Performed by: NURSE PRACTITIONER

## 2022-01-20 NOTE — PROGRESS NOTES
"Subjective:       Patient ID: Sami Kerns is a 72 y.o. male.    Chief Complaint: Other (New referral from Dr Asim Wade for urine incontinence)    Mr. Kerns is here today for iniial evaluation for urinary incontinence, by referral from Dr. Wade (Neurospine).  Patient is not on Urology or OAB meds presently.  He is followed by Dr. Melchor for CHF and s/p triple bypass.  He denies RAULITO, UUI or CHARANJIT.  IPSS 14, which reflects significant nocturia urgency, weak stream and not feeling as though he is fully emptying.  Voices c/o terminal dribbling after voiding, occasional dysuria and "a little scrotal tenderness".  PVR 77 ml.  ILeft testicular pain and mild swelling on PE, with approximate 40g asymptomatic prostate.  Lab Results               PSA                      1.940               09/15/2021              -------(1/20/2022)--------------------------------------------------------------      Review of Systems   Constitutional: Negative.    Respiratory: Negative.    Cardiovascular: Negative.    Gastrointestinal: Negative.    Endocrine: Negative.    Genitourinary: Positive for difficulty urinating, dysuria, frequency and urgency. Negative for decreased urine volume, enuresis, flank pain, genital sores, hematuria, penile discharge, penile pain, penile swelling, scrotal swelling and testicular pain.        Weak stream, feels he is not emptying;  Nocturia x 4;  dribbling   Musculoskeletal: Negative.    Skin: Negative.    Neurological:        Normal voiding sensation   Psychiatric/Behavioral: Negative.        Objective:      Physical Exam  Vitals and nursing note reviewed.   Constitutional:       General: He is not in acute distress.     Appearance: Normal appearance. He is normal weight. He is not ill-appearing, toxic-appearing or diaphoretic.   Eyes:      General: No scleral icterus.  Cardiovascular:      Rate and Rhythm: Normal rate.   Pulmonary:      Effort: Pulmonary effort is normal.   Abdominal:      General: " There is no distension.      Palpations: Abdomen is soft. There is no mass.      Tenderness: There is no abdominal tenderness. There is no right CVA tenderness, left CVA tenderness or guarding.      Hernia: No hernia is present.   Genitourinary:     Penis: Normal.       Rectum: Normal.      Comments: Prostate enlarged, estimated 40g;  Left scrotal, testicular tenderness with mild swelling, no hernia bilaterally.  Uncircumcised male  Musculoskeletal:      Right lower leg: No edema.      Left lower leg: No edema.   Skin:     General: Skin is warm and dry.      Coloration: Skin is not jaundiced or pale.      Findings: No bruising, erythema, lesion or rash.   Neurological:      Mental Status: He is alert and oriented to person, place, and time.   Psychiatric:         Mood and Affect: Mood normal.         Judgment: Judgment normal.         Assessment:       1. BPH with obstruction/lower urinary tract symptoms    2. Urinary incontinence, unspecified type    3. Orchitis        Plan:       Orchitis  · WILL CONSIDER TREATING FOR LEFT ORCHITIS IF URINE CX NEGATIVE    Urinary incontinence  · Dribbling only  · Not a candidate for flomax  · If not improved with antx, will consider alternative for BPH    BPH with obstruction/lower urinary tract symptoms  · Not a candidate for flomax  · If not improved with antx, will consider alternative for BPH  · IPSS 14, PVR  ml

## 2022-01-20 NOTE — ASSESSMENT & PLAN NOTE
· Dribbling only  · Not a candidate for flomax  · If not improved with antx, will consider alternative for BPH

## 2022-01-20 NOTE — ASSESSMENT & PLAN NOTE
· Not a candidate for flomax  · If not improved with antx, will consider alternative for BPH  · IPSS 14, PVR  ml

## 2022-01-20 NOTE — ASSESSMENT & PLAN NOTE
· Urine Cx negative for infection.  Will tx for underlying Orchitis with 10 day course of Doxycycline for left testicular sxs, along with daily probiotics.  RTC 2 weeks for recheck.

## 2022-01-22 LAB — UA COMPLETE W REFLEX CULTURE PNL UR: NO GROWTH

## 2022-01-24 RX ORDER — DOXYCYCLINE 100 MG/1
100 CAPSULE ORAL EVERY 12 HOURS
Qty: 20 CAPSULE | Refills: 0 | Status: SHIPPED | OUTPATIENT
Start: 2022-01-24 | End: 2022-02-03

## 2022-01-24 NOTE — PROGRESS NOTES
Urine Cx negative for infection.  Will tx for underlying Orchitis with 10 day course of Doxycycline for left testicular sxs, along with daily probiotics.  RTC 2 weeks for recheck.

## 2022-01-27 ENCOUNTER — TELEPHONE (OUTPATIENT)
Dept: UROLOGY | Facility: CLINIC | Age: 72
End: 2022-01-27
Payer: MEDICARE

## 2022-01-27 NOTE — TELEPHONE ENCOUNTER
----- Message from SARA Godlman sent at 1/24/2022  7:38 AM CST -----  Urine Cx negative for infection.  Will tx for underlying Orchitis with 10 day course of Doxycycline for left testicular sxs, along with daily probiotics.  RTC 2 weeks for recheck.  I've called multiple times this morning and got busy signal sound.  Unable to leave a message.  I will attempt call later.   I called his sister and got busy signal on her phone also.

## 2022-01-28 ENCOUNTER — TELEPHONE (OUTPATIENT)
Dept: UROLOGY | Facility: CLINIC | Age: 72
End: 2022-01-28
Payer: MEDICARE

## 2022-01-28 NOTE — TELEPHONE ENCOUNTER
----- Message from SARA Goldman sent at 1/24/2022  7:38 AM CST -----  Urine Cx negative for infection.  Will tx for underlying Orchitis with 10 day course of Doxycycline for left testicular sxs, along with daily probiotics.  RTC 2 weeks for recheck.  I called and spoke with pt.  Informed him of the above message and that Orchitis is inflammation in the testicles.  Told him the probiotic can be bought OTC without Rx and he needs to take it once a day while taking antibiotics.  He voiced understanding and says he will pick it up tomorrow.

## 2022-02-01 ENCOUNTER — OFFICE VISIT (OUTPATIENT)
Dept: FAMILY MEDICINE | Facility: CLINIC | Age: 72
End: 2022-02-01
Payer: MEDICARE

## 2022-02-01 ENCOUNTER — TELEPHONE (OUTPATIENT)
Dept: UROLOGY | Facility: CLINIC | Age: 72
End: 2022-02-01
Payer: MEDICARE

## 2022-02-01 VITALS
BODY MASS INDEX: 28.03 KG/M2 | DIASTOLIC BLOOD PRESSURE: 57 MMHG | OXYGEN SATURATION: 100 % | HEART RATE: 66 BPM | HEIGHT: 70 IN | TEMPERATURE: 98 F | SYSTOLIC BLOOD PRESSURE: 134 MMHG | RESPIRATION RATE: 18 BRPM | WEIGHT: 195.81 LBS

## 2022-02-01 DIAGNOSIS — I10 HYPERTENSION, UNSPECIFIED TYPE: Primary | Chronic | ICD-10-CM

## 2022-02-01 DIAGNOSIS — N40.1 BPH WITH OBSTRUCTION/LOWER URINARY TRACT SYMPTOMS: Chronic | ICD-10-CM

## 2022-02-01 DIAGNOSIS — Z87.891 PERSONAL HISTORY OF NICOTINE DEPENDENCE: ICD-10-CM

## 2022-02-01 DIAGNOSIS — D64.9 ANEMIA, UNSPECIFIED TYPE: ICD-10-CM

## 2022-02-01 DIAGNOSIS — N13.8 BPH WITH OBSTRUCTION/LOWER URINARY TRACT SYMPTOMS: Chronic | ICD-10-CM

## 2022-02-01 DIAGNOSIS — R32 URINARY INCONTINENCE, UNSPECIFIED TYPE: ICD-10-CM

## 2022-02-01 DIAGNOSIS — Z23 IMMUNIZATION DUE: ICD-10-CM

## 2022-02-01 DIAGNOSIS — J44.9 CHRONIC OBSTRUCTIVE PULMONARY DISEASE, UNSPECIFIED COPD TYPE: Chronic | ICD-10-CM

## 2022-02-01 LAB
BASOPHILS # BLD AUTO: 0.03 K/UL (ref 0–0.2)
BASOPHILS NFR BLD AUTO: 0.5 % (ref 0–1)
BILIRUB UR QL STRIP: NEGATIVE
CLARITY UR: CLEAR
COLOR UR: YELLOW
DIFFERENTIAL METHOD BLD: ABNORMAL
EOSINOPHIL # BLD AUTO: 0.06 K/UL (ref 0–0.5)
EOSINOPHIL NFR BLD AUTO: 0.9 % (ref 1–4)
ERYTHROCYTE [DISTWIDTH] IN BLOOD BY AUTOMATED COUNT: 15.8 % (ref 11.5–14.5)
GLUCOSE UR STRIP-MCNC: NEGATIVE MG/DL
HCT VFR BLD AUTO: 34.5 % (ref 40–54)
HGB BLD-MCNC: 11.5 G/DL (ref 13.5–18)
IMM GRANULOCYTES # BLD AUTO: 0.02 K/UL (ref 0–0.04)
IMM GRANULOCYTES NFR BLD: 0.3 % (ref 0–0.4)
KETONES UR STRIP-SCNC: NEGATIVE MG/DL
LEUKOCYTE ESTERASE UR QL STRIP: NEGATIVE
LYMPHOCYTES # BLD AUTO: 1.38 K/UL (ref 1–4.8)
LYMPHOCYTES NFR BLD AUTO: 21.2 % (ref 27–41)
MCH RBC QN AUTO: 25.3 PG (ref 27–31)
MCHC RBC AUTO-ENTMCNC: 33.3 G/DL (ref 32–36)
MCV RBC AUTO: 76 FL (ref 80–96)
MONOCYTES # BLD AUTO: 0.57 K/UL (ref 0–0.8)
MONOCYTES NFR BLD AUTO: 8.8 % (ref 2–6)
MPC BLD CALC-MCNC: 10.9 FL (ref 9.4–12.4)
NEUTROPHILS # BLD AUTO: 4.45 K/UL (ref 1.8–7.7)
NEUTROPHILS NFR BLD AUTO: 68.3 % (ref 53–65)
NITRITE UR QL STRIP: NEGATIVE
NRBC # BLD AUTO: 0 X10E3/UL
NRBC, AUTO (.00): 0 %
PH UR STRIP: 6 PH UNITS
PLATELET # BLD AUTO: 314 K/UL (ref 150–400)
PROT UR QL STRIP: NEGATIVE
RBC # BLD AUTO: 4.54 M/UL (ref 4.6–6.2)
RBC # UR STRIP: NEGATIVE /UL
SP GR UR STRIP: <=1.005
UROBILINOGEN UR STRIP-ACNC: 0.2 MG/DL
WBC # BLD AUTO: 6.51 K/UL (ref 4.5–11)

## 2022-02-01 PROCEDURE — 1159F PR MEDICATION LIST DOCUMENTED IN MEDICAL RECORD: ICD-10-PCS | Mod: ,,, | Performed by: INTERNAL MEDICINE

## 2022-02-01 PROCEDURE — G0009 PR ADMIN PNEUMOCOCCAL VACCINE: ICD-10-PCS | Mod: ,,, | Performed by: INTERNAL MEDICINE

## 2022-02-01 PROCEDURE — 1160F PR REVIEW ALL MEDS BY PRESCRIBER/CLIN PHARMACIST DOCUMENTED: ICD-10-PCS | Mod: ,,, | Performed by: INTERNAL MEDICINE

## 2022-02-01 PROCEDURE — 1126F AMNT PAIN NOTED NONE PRSNT: CPT | Mod: ,,, | Performed by: INTERNAL MEDICINE

## 2022-02-01 PROCEDURE — 1160F RVW MEDS BY RX/DR IN RCRD: CPT | Mod: ,,, | Performed by: INTERNAL MEDICINE

## 2022-02-01 PROCEDURE — 85025 CBC WITH DIFFERENTIAL: ICD-10-PCS | Mod: ,,, | Performed by: CLINICAL MEDICAL LABORATORY

## 2022-02-01 PROCEDURE — 81003 URINALYSIS: ICD-10-PCS | Mod: QW,,, | Performed by: CLINICAL MEDICAL LABORATORY

## 2022-02-01 PROCEDURE — G0009 ADMIN PNEUMOCOCCAL VACCINE: HCPCS | Mod: ,,, | Performed by: INTERNAL MEDICINE

## 2022-02-01 PROCEDURE — 85025 COMPLETE CBC W/AUTO DIFF WBC: CPT | Mod: ,,, | Performed by: CLINICAL MEDICAL LABORATORY

## 2022-02-01 PROCEDURE — 99214 OFFICE O/P EST MOD 30 MIN: CPT | Mod: 25,,, | Performed by: INTERNAL MEDICINE

## 2022-02-01 PROCEDURE — 3078F PR MOST RECENT DIASTOLIC BLOOD PRESSURE < 80 MM HG: ICD-10-PCS | Mod: ,,, | Performed by: INTERNAL MEDICINE

## 2022-02-01 PROCEDURE — 1101F PT FALLS ASSESS-DOCD LE1/YR: CPT | Mod: ,,, | Performed by: INTERNAL MEDICINE

## 2022-02-01 PROCEDURE — 1126F PR PAIN SEVERITY QUANTIFIED, NO PAIN PRESENT: ICD-10-PCS | Mod: ,,, | Performed by: INTERNAL MEDICINE

## 2022-02-01 PROCEDURE — 3008F PR BODY MASS INDEX (BMI) DOCUMENTED: ICD-10-PCS | Mod: ,,, | Performed by: INTERNAL MEDICINE

## 2022-02-01 PROCEDURE — 90670 PCV13 VACCINE IM: CPT | Mod: ,,, | Performed by: INTERNAL MEDICINE

## 2022-02-01 PROCEDURE — 3288F FALL RISK ASSESSMENT DOCD: CPT | Mod: ,,, | Performed by: INTERNAL MEDICINE

## 2022-02-01 PROCEDURE — 3288F PR FALLS RISK ASSESSMENT DOCUMENTED: ICD-10-PCS | Mod: ,,, | Performed by: INTERNAL MEDICINE

## 2022-02-01 PROCEDURE — 3008F BODY MASS INDEX DOCD: CPT | Mod: ,,, | Performed by: INTERNAL MEDICINE

## 2022-02-01 PROCEDURE — 99214 PR OFFICE/OUTPT VISIT, EST, LEVL IV, 30-39 MIN: ICD-10-PCS | Mod: 25,,, | Performed by: INTERNAL MEDICINE

## 2022-02-01 PROCEDURE — 81003 URINALYSIS AUTO W/O SCOPE: CPT | Mod: QW,,, | Performed by: CLINICAL MEDICAL LABORATORY

## 2022-02-01 PROCEDURE — 1159F MED LIST DOCD IN RCRD: CPT | Mod: ,,, | Performed by: INTERNAL MEDICINE

## 2022-02-01 PROCEDURE — 3075F SYST BP GE 130 - 139MM HG: CPT | Mod: ,,, | Performed by: INTERNAL MEDICINE

## 2022-02-01 PROCEDURE — 1101F PR PT FALLS ASSESS DOC 0-1 FALLS W/OUT INJ PAST YR: ICD-10-PCS | Mod: ,,, | Performed by: INTERNAL MEDICINE

## 2022-02-01 PROCEDURE — 3075F PR MOST RECENT SYSTOLIC BLOOD PRESS GE 130-139MM HG: ICD-10-PCS | Mod: ,,, | Performed by: INTERNAL MEDICINE

## 2022-02-01 PROCEDURE — 3078F DIAST BP <80 MM HG: CPT | Mod: ,,, | Performed by: INTERNAL MEDICINE

## 2022-02-01 PROCEDURE — 90670 PR PNEUMOCOCCAL CONJ VACCINE 13 VALENT IM: ICD-10-PCS | Mod: ,,, | Performed by: INTERNAL MEDICINE

## 2022-02-01 RX ORDER — ATORVASTATIN CALCIUM 80 MG/1
1 TABLET, FILM COATED ORAL DAILY
COMMUNITY
Start: 2021-10-21

## 2022-02-01 RX ORDER — METOPROLOL SUCCINATE 50 MG/1
1 TABLET, EXTENDED RELEASE ORAL DAILY
COMMUNITY
Start: 2021-12-23

## 2022-02-01 NOTE — PROGRESS NOTES
Subjective:       Patient ID: Sami Kerns is a 72 y.o. male.    Chief Complaint: Hypertension and Hyperlipidemia    Patient is here for follow up evaluation. Blood pressure is 134/57 today. Patient states he is doing ok today. States he has a new diagnosis of anemia. States he is a current one cigarette a day smoker and has smoked cigarettes since he was a teenager. Patient states he follows with Dr Wade. Patient is not up to date on shingles or pneumonia vaccines and will receive both in house today. Patient is up to date on colonoscopy. Will order in house labs today. Will refer to gastroenterology for EGD. Will order US of abdominal aorta and CT of chest due to years of smoking history. Will order hemoccult stool sample.  Follow up in 6 weeks.       Current Medications:    Current Outpatient Medications:     amiodarone (PACERONE) 200 MG Tab, Take by mouth once daily., Disp: , Rfl:     ascorbic acid, vitamin C, (VITAMIN C) 500 MG tablet, Take 500 mg by mouth once daily., Disp: , Rfl:     aspirin (ECOTRIN) 81 MG EC tablet, Take 81 mg by mouth once daily., Disp: , Rfl:     atorvastatin (LIPITOR) 80 MG tablet, Take 1 tablet by mouth once daily., Disp: , Rfl:     cilostazoL (PLETAL) 100 MG Tab, Take 50 mg by mouth 2 (two) times daily., Disp: , Rfl:     clopidogreL (PLAVIX) 75 mg tablet, Take 75 mg by mouth once daily., Disp: , Rfl:     doxycycline (VIBRAMYCIN) 100 MG Cap, Take 1 capsule (100 mg total) by mouth every 12 (twelve) hours. Take with daily probiotics OTC. for 10 days, Disp: 20 capsule, Rfl: 0    furosemide (LASIX) 40 MG tablet, Take 40 mg by mouth 2 (two) times daily., Disp: , Rfl:     hydrALAZINE (APRESOLINE) 50 MG tablet, Take 50 mg by mouth 3 (three) times daily., Disp: , Rfl:     losartan (COZAAR) 100 MG tablet, Take 100 mg by mouth once daily., Disp: , Rfl:     metoprolol succinate (TOPROL-XL) 50 MG 24 hr tablet, Take 1 tablet by mouth once daily., Disp: , Rfl:     naproxen  (NAPROSYN) 500 MG tablet, Take 1 tablet (500 mg total) by mouth 2 (two) times daily as needed (pain)., Disp: 20 tablet, Rfl: 1  No current facility-administered medications for this visit.    Last Labs:     Office Visit on 01/20/2022   Component Date Value    Culture, Urine 01/20/2022 No Growth        Last Imaging:  X-Ray Hip 2 or 3 views Left (with Pelvis when performed)  Narrative: EXAMINATION:  XR HIP WITH PELVIS WHEN PERFORMED, 2 OR 3 VIEWS LEFT    CLINICAL HISTORY:  Pain in left hip    COMPARISON:  Pelvis/hip x-ray September 8, 2021    TECHNIQUE:  Single frontal view of the pelvis as well as frontal and frogleg lateral views of the left hip.    FINDINGS:  Mild to moderate degenerative change of the right greater than left hips.  No convincing acute fracture or dislocation demonstrated. No concerning radiopaque foreign body visualized.  Atherosclerotic calcification demonstrated.  Impression: As above.    Point of Service: [facility]    Electronically signed by: Sheldon Verma  Date:    12/22/2021  Time:    15:08         Review of Systems   All other systems reviewed and are negative.        Objective:      Physical Exam  Vitals reviewed.   Constitutional:       Appearance: Normal appearance. He is obese.   Cardiovascular:      Rate and Rhythm: Normal rate and regular rhythm.      Pulses: Normal pulses.      Heart sounds: Normal heart sounds.   Pulmonary:      Effort: Pulmonary effort is normal.      Breath sounds: Normal breath sounds.   Abdominal:      General: Abdomen is flat. Bowel sounds are normal.      Palpations: Abdomen is soft.   Musculoskeletal:         General: Normal range of motion.      Cervical back: Normal range of motion and neck supple.   Skin:     General: Skin is warm and dry.   Neurological:      General: No focal deficit present.      Mental Status: He is alert and oriented to person, place, and time. Mental status is at baseline.         Assessment:       1. Hypertension,  unspecified type  CBC Auto Differential    CBC Auto Differential    Stable   2. Chronic obstructive pulmonary disease, unspecified COPD type  CT Chest Lung Screening Low Dose   3. BPH with obstruction/lower urinary tract symptoms     4. Immunization due  pneumoc 13-luis conj-dip cr(PF) (PREVNAR 13 (PF)) 0.5 mL    Shingles and pneumonia vaccine given   5. Anemia, unspecified type  Ambulatory referral/consult to Gastroenterology    Radiology US Abdominal Aorta    Occult blood x 3, stool    Labs ordered   6. Urinary incontinence, unspecified type  Urinalysis   7. Personal history of nicotine dependence   CT Chest Lung Screening Low Dose    CT and US ordered        Plan:         Sami was seen today for hypertension and hyperlipidemia.    Diagnoses and all orders for this visit:    Hypertension, unspecified type  Comments:  Stable  Orders:  -     CBC Auto Differential; Future  -     CBC Auto Differential    Chronic obstructive pulmonary disease, unspecified COPD type  -     CT Chest Lung Screening Low Dose; Future    BPH with obstruction/lower urinary tract symptoms    Immunization due  Comments:  Shingles and pneumonia vaccine given  Orders:  -     pneumoc 13-luis conj-dip cr(PF) (PREVNAR 13 (PF)) 0.5 mL    Anemia, unspecified type  Comments:  Labs ordered  Orders:  -     Ambulatory referral/consult to Gastroenterology; Future  -     Radiology US Abdominal Aorta; Future  -     Occult blood x 3, stool; Future    Urinary incontinence, unspecified type  -     Urinalysis    Personal history of nicotine dependence   Comments:  CT and US ordered  Orders:  -     CT Chest Lung Screening Low Dose; Future     Follow up in 6 weeks.

## 2022-02-01 NOTE — TELEPHONE ENCOUNTER
I called pt and got no answer.  It went to voice mail.  I ledt message that his 2 week followup appointment is scheduled with ALFRED Wade  For Wednesday 2-26-22 at 8:00am.  If there is any conflict, please call urology back.

## 2022-02-01 NOTE — PATIENT INSTRUCTIONS
Sami was seen today for hypertension and hyperlipidemia.    Diagnoses and all orders for this visit:    Hypertension, unspecified type  Comments:  Stable  Orders:  -     CBC Auto Differential; Future  -     CBC Auto Differential    Chronic obstructive pulmonary disease, unspecified COPD type  -     CT Chest Lung Screening Low Dose; Future    BPH with obstruction/lower urinary tract symptoms    Immunization due  Comments:  Shingles and pneumonia vaccine given  Orders:  -     pneumoc 13-luis conj-dip cr(PF) (PREVNAR 13 (PF)) 0.5 mL    Anemia, unspecified type  Comments:  Labs ordered  Orders:  -     Ambulatory referral/consult to Gastroenterology; Future  -     Radiology US Abdominal Aorta; Future  -     Occult blood x 3, stool; Future    Urinary incontinence, unspecified type  -     Urinalysis    Personal history of nicotine dependence   Comments:  CT and US ordered  Orders:  -     CT Chest Lung Screening Low Dose; Future

## 2022-02-16 ENCOUNTER — OFFICE VISIT (OUTPATIENT)
Dept: UROLOGY | Facility: CLINIC | Age: 72
End: 2022-02-16
Payer: MEDICARE

## 2022-02-16 VITALS
DIASTOLIC BLOOD PRESSURE: 62 MMHG | OXYGEN SATURATION: 98 % | BODY MASS INDEX: 27.2 KG/M2 | HEART RATE: 67 BPM | TEMPERATURE: 98 F | HEIGHT: 70 IN | WEIGHT: 190 LBS | SYSTOLIC BLOOD PRESSURE: 140 MMHG

## 2022-02-16 DIAGNOSIS — N13.8 BPH WITH OBSTRUCTION/LOWER URINARY TRACT SYMPTOMS: Primary | ICD-10-CM

## 2022-02-16 DIAGNOSIS — R32 URINARY INCONTINENCE, UNSPECIFIED TYPE: ICD-10-CM

## 2022-02-16 DIAGNOSIS — N45.2 ORCHITIS: ICD-10-CM

## 2022-02-16 DIAGNOSIS — N40.1 BPH WITH OBSTRUCTION/LOWER URINARY TRACT SYMPTOMS: Primary | ICD-10-CM

## 2022-02-16 PROCEDURE — 3288F FALL RISK ASSESSMENT DOCD: CPT | Mod: CPTII,,, | Performed by: NURSE PRACTITIONER

## 2022-02-16 PROCEDURE — 3288F PR FALLS RISK ASSESSMENT DOCUMENTED: ICD-10-PCS | Mod: CPTII,,, | Performed by: NURSE PRACTITIONER

## 2022-02-16 PROCEDURE — 3008F PR BODY MASS INDEX (BMI) DOCUMENTED: ICD-10-PCS | Mod: CPTII,,, | Performed by: NURSE PRACTITIONER

## 2022-02-16 PROCEDURE — 3078F PR MOST RECENT DIASTOLIC BLOOD PRESSURE < 80 MM HG: ICD-10-PCS | Mod: CPTII,,, | Performed by: NURSE PRACTITIONER

## 2022-02-16 PROCEDURE — 3077F SYST BP >= 140 MM HG: CPT | Mod: CPTII,,, | Performed by: NURSE PRACTITIONER

## 2022-02-16 PROCEDURE — 51798 US URINE CAPACITY MEASURE: CPT | Mod: PBBFAC | Performed by: NURSE PRACTITIONER

## 2022-02-16 PROCEDURE — 1126F AMNT PAIN NOTED NONE PRSNT: CPT | Mod: CPTII,,, | Performed by: NURSE PRACTITIONER

## 2022-02-16 PROCEDURE — 1101F PT FALLS ASSESS-DOCD LE1/YR: CPT | Mod: CPTII,,, | Performed by: NURSE PRACTITIONER

## 2022-02-16 PROCEDURE — 1126F PR PAIN SEVERITY QUANTIFIED, NO PAIN PRESENT: ICD-10-PCS | Mod: CPTII,,, | Performed by: NURSE PRACTITIONER

## 2022-02-16 PROCEDURE — 3078F DIAST BP <80 MM HG: CPT | Mod: CPTII,,, | Performed by: NURSE PRACTITIONER

## 2022-02-16 PROCEDURE — 99214 OFFICE O/P EST MOD 30 MIN: CPT | Mod: S$PBB,,, | Performed by: NURSE PRACTITIONER

## 2022-02-16 PROCEDURE — 3008F BODY MASS INDEX DOCD: CPT | Mod: CPTII,,, | Performed by: NURSE PRACTITIONER

## 2022-02-16 PROCEDURE — 99214 PR OFFICE/OUTPT VISIT, EST, LEVL IV, 30-39 MIN: ICD-10-PCS | Mod: S$PBB,,, | Performed by: NURSE PRACTITIONER

## 2022-02-16 PROCEDURE — 99214 OFFICE O/P EST MOD 30 MIN: CPT | Mod: PBBFAC | Performed by: NURSE PRACTITIONER

## 2022-02-16 PROCEDURE — 1101F PR PT FALLS ASSESS DOC 0-1 FALLS W/OUT INJ PAST YR: ICD-10-PCS | Mod: CPTII,,, | Performed by: NURSE PRACTITIONER

## 2022-02-16 PROCEDURE — 3077F PR MOST RECENT SYSTOLIC BLOOD PRESSURE >= 140 MM HG: ICD-10-PCS | Mod: CPTII,,, | Performed by: NURSE PRACTITIONER

## 2022-02-16 RX ORDER — TAMSULOSIN HYDROCHLORIDE 0.4 MG/1
0.4 CAPSULE ORAL DAILY
Qty: 90 CAPSULE | Refills: 3 | Status: SHIPPED | OUTPATIENT
Start: 2022-02-16 | End: 2023-02-15 | Stop reason: SDUPTHER

## 2022-02-16 NOTE — PROGRESS NOTES
"Subjective:       Patient ID: Sami Kerns is a 72 y.o. male.    Chief Complaint: Other (2 week follow up for Orchitis--did not bring meds with him----finished antibiotics few days ago--)    Mr. Kerns is here today for iniial evaluation for urinary incontinence, by referral from Dr. Wade ([facility]).  Patient is not on Urology or OAB meds presently.  He is followed by Dr. Melchor for CHF and s/p triple bypass.  He denies RAULITO, UUI or CHARANJIT.  IPSS 14, which reflects significant nocturia urgency, weak stream and not feeling as though he is fully emptying.  Voices c/o terminal dribbling after voiding, occasional dysuria and "a little scrotal tenderness".  PVR 77 ml.  Left testicular pain and mild swelling on PE, with approximate 40g asymptomatic prostate.  Lab Results               PSA                      1.940               09/15/2021              -------(1/20/2022)--------------------------------------------------------------    Mr. Kerns has RTC for 2 week f/u following 10 day course of doxycycline for orchitis.  Patient states left testicle still "mildly tender".    C/o right lower flank pain with radiation to back.  Patient states told by Dr. Melchor, Cardiology, that "his kidney is weak".  Other c/o urinary dribbling.  IPSS 11, reflecting nocturia x 4, weak stream and persistent terminal dribble.  Other c/o intermittent right flank pain, without past history of stone disease or hematuria.   He does not feel course of doxycycline was helpful.  PVR 14 last visit.   PVR significantly worse at 184 ml this morning, 77 last visit.  He is not currently taking urology meds.  (2/16/2022)--------------------        Review of Systems   Constitutional: Negative.    Respiratory: Negative.    Cardiovascular: Negative.    Gastrointestinal: Negative.    Endocrine: Negative.    Genitourinary: Positive for flank pain, frequency and urgency. Negative for decreased urine volume, difficulty urinating, dysuria, enuresis, " genital sores, hematuria, penile discharge, penile pain, penile swelling, scrotal swelling and testicular pain.        Weak stream, feels he is not emptying;  Nocturia x 4;  dribbling   Skin: Negative.    Neurological:        Normal voiding sensation   Psychiatric/Behavioral: Negative.        Objective:      Physical Exam  Vitals and nursing note reviewed.   Constitutional:       General: He is not in acute distress.     Appearance: Normal appearance. He is normal weight. He is not ill-appearing, toxic-appearing or diaphoretic.   Eyes:      General: No scleral icterus.  Cardiovascular:      Rate and Rhythm: Normal rate.   Pulmonary:      Effort: Pulmonary effort is normal.   Abdominal:      General: There is no distension.      Palpations: Abdomen is soft. There is no mass.      Tenderness: There is no abdominal tenderness. There is no right CVA tenderness, left CVA tenderness or guarding.      Hernia: No hernia is present.   Musculoskeletal:      Right lower leg: No edema.      Left lower leg: No edema.   Skin:     General: Skin is warm and dry.      Coloration: Skin is not jaundiced or pale.      Findings: No rash.   Neurological:      Mental Status: He is alert and oriented to person, place, and time. Mental status is at baseline.   Psychiatric:         Mood and Affect: Mood normal.         Behavior: Behavior normal.         Judgment: Judgment normal.         Assessment:       1. BPH with obstruction/lower urinary tract symptoms    2. Urinary incontinence, unspecified type    3. Orchitis        Plan:       BPH with obstruction/lower urinary tract symptoms  · Started flomax QHS, patient states he tolerated this in the past without side effects.    · IPSS 11,  ML    Urinary incontinence  Persistent terminal dribbling and only occassional UUI    Orchitis  · Testicular pain improved 90% with 2 week course of doxycycline.

## 2022-02-16 NOTE — PATIENT INSTRUCTIONS
Patient Education       Benign Prostatic Hyperplasia (Enlarged Prostate) Discharge Instructions   About this topic   The prostate is a part of your body that helps make semen and is found at the base of the penis and in front of the rectum. In benign prostatic hyperplasia, or BPH, the prostate grows bigger. Your prostate often gets larger as you get older. The flow of urine may be blocked as the prostate gets larger. BPH is not cancer.     If your prostate is large, you may have trouble passing urine. You may not be able to empty your bladder all the way. You may pass smaller amounts of urine, go more often, or wake at night to pass urine. Some people have trouble starting to pass urine or may leak or dribble urine after they stop.  Your treatment may include:  · Waiting to see if the signs get better or get worse. You may not need any treatment if your prostate does not bother you very much.  · Placing a tube, called a catheter, through your penis into your bladder. The catheter may help drain your urine if you are not able to pass urine on your own.  · Taking drugs to help relax the muscles or stop the prostate from growing larger.  · Having a procedure to shrink the size of the prostate using heat or ultrasound.  · Surgery to remove part or all of the prostate.  What care is needed at home?   · Ask your doctor what you need to do when you go home. Make sure you ask questions if you do not understand what the doctor says you need to do.  · Your doctor may put a tube called a catheter into your penis to help drain urine. The tube will need to stay in for 7 to 10 days. You will be taught about how to care for the tube. Care may include:  ? Attach the tube to your leg using a special cohn or straps. This will help keep the tube from getting pulled out.  ? Keep the tubing free of kinks or bends. Then, urine can drain easily.  ? Keep your urine bag below the level of your hips. Then urine will not flow back into your  body.  ? Empty your urine bag when 2/3 full. Be sure to wash your hands before and after you empty the bag. Your doctor may want you to measure and write down how much urine you empty from the bag.  ? Clean your penis using soap and water two times each day. Rinse with plain water and gently pat dry to prevent infection.  ? Ask your doctor if you are able to take a shower while you have the tube in place.  ? Your doctor will be able to remove the tube in the office.  ? Do not soak in the tub, a pool, or other body of water with the catheter in place.  What follow-up care is needed?   · Your doctor may ask you to make visits to the office to check on your progress. Be sure to keep your visits.  · Your doctor will tell you if you need to see a urologist, which is a doctor with special training to treat problems with urine flow and prostate problems.  · If your signs get worse, your doctor may suggest procedures to reduce the size of your prostate.  What drugs may be needed?   The doctor may order drugs to:  · Control your prostate enlargement  · Help with pain  · Prevent infection  Will physical activity be limited?   If you have a catheter in, you may need to limit lifting and some activities until the catheter is removed. Otherwise, you should be able to return to your normal activities.  What changes to diet are needed?   Eat a healthy diet with whole grains, fruits, and vegetables. Drink at least 8 glasses of fluid each day to prevent hard stools. If you have hard stools, you tend to strain and that can put more pressure on the prostate. Ask your doctor if you can take a laxative if you have hard stools.  What problems could happen?   · Urinary tract infections  · Problems with erections or discharge of semen from the penis during climax  · Bleeding  What can be done to prevent this health problem?   Your prostate gets bigger as you get older. You cannot stop the prostate from getting bigger. Doing the following  things may help with the signs of BPH:  · Pass urine when you first feel the urge.  · Do not stop your flow.  · Avoid beer, wine, and mixed drinks (alcohol). Avoid caffeine. The listed drinks can make you go more often and bother your urinary tract.  · Avoid or drink less fluids with caffeine that can make you go more often and bother your urinary tract.  · Stop drinking fluids a few hours before bedtime or going out.  · After you empty your bladder, wait a moment and try to go again (double voiding). Do not strain or push to empty your bladder.  · Have a physical exam each year. If you are 40 years old or older, you should have your prostate checked every year.  When do I need to call the doctor?   Call your doctor if you have a catheter in place and you have:  · Signs of infection, such as fever of 100.4°F (38°C) or higher, chills.  · Itching and redness in the area where your catheter was put in  · Lots of blood in your urine  · Cloudy colored urine or pus in urine  · Little or no urine is draining  · Accidentally pulled out your catheter  · Upset stomach, throwing up, or very bad belly pain  · Pain in your lower back on one side or both sides, over the kidneys  · Pain not helped by drugs ordered  · You are not feeling better in 2 to 3 days or you are feeling worse  If you do not have a catheter, be sure to call your doctor if your signs get worse or if you have:  · Signs of urine infection such as a fever of 100.4°F (38°C) or higher, chills, burning or pain when you pass urine  · No urine or more problems passing urine  · You are not feeling better in 2 to 3 days or you are feeling worse  Teach Back: Helping You Understand   The Teach Back Method helps you understand the information we are giving you. After you talk with the staff, tell them in your own words what you learned. This helps to make sure the staff has described each thing clearly. It also helps to explain things that may have been confusing. Before  going home, make sure you can do these:  · I can tell you about my condition.  · I can tell you how to care for my drainage tube, if I have one.  · I can tell you what I will do if I have cloudy urine or little or no urine coming out.  Where can I learn more?   American Academy of Family Physicians  https://familydoctor.org/condition/benign-prostatic-hyperplasia-bph/   American Urological Association Foundation  https://www.urologyhealth.org/urologic-conditions/benign-prostatic-hyperplasia-(bph)   NHS Choices  https://www.nhs.uk/conditions/prostate-enlargement/   Last Reviewed Date   2021-03-30  Consumer Information Use and Disclaimer   This information is not specific medical advice and does not replace information you receive from your health care provider. This is only a brief summary of general information. It does NOT include all information about conditions, illnesses, injuries, tests, procedures, treatments, therapies, discharge instructions or life-style choices that may apply to you. You must talk with your health care provider for complete information about your health and treatment options. This information should not be used to decide whether or not to accept your health care providers advice, instructions or recommendations. Only your health care provider has the knowledge and training to provide advice that is right for you.  Copyright   Copyright © 2021 [employer] and its affiliates and/or licensors. All rights reserved.  Patient Education       Tamsulosin (barlow XOCHILT ruperto sin)   Brand Names: US Flomax   Brand Names: Mukesh APO-Tamsulosin CR; Flomax CR; RATIO-Tamsulosin [DSC]; SANDOZ Tamsulosin; SANDOZ Tamsulosin CR; TEVA-Tamsulosin CR; TEVA-Tamsulosin [DSC]   What is this drug used for?   · It is used to treat the signs of an enlarged prostate.  · It may be given to you for other reasons. Talk with the doctor.    What do I need to tell my doctor BEFORE I take this drug?   · If you have an allergy to  tamsulosin or any other part of this drug.  · If you are allergic to this drug; any part of this drug; or any other drugs, foods, or substances. Tell your doctor about the allergy and what signs you had.  · If you take any drugs (prescription or OTC, natural products, vitamins) that must not be taken with this drug, like certain drugs that are used for HIV, infections, or depression. There are many drugs that must not be taken with this drug. Your doctor or pharmacist can tell you if you are taking a drug that must not be taken with this drug.  · If you are using another drug like this one. If you are not sure, ask your doctor or pharmacist.  This is not a list of all drugs or health problems that interact with this drug.  Tell your doctor and pharmacist about all of your drugs (prescription or OTC, natural products, vitamins) and health problems. You must check to make sure that it is safe for you to take this drug with all of your drugs and health problems. Do not start, stop, or change the dose of any drug without checking with your doctor.  What are some things I need to know or do while I take this drug?   For all patients taking this drug:   · Tell all of your health care providers that you take this drug. This includes your doctors, nurses, pharmacists, and dentists.  · Avoid driving and doing other tasks or actions that call for you to be alert until you see how this drug affects you.  · To lower the chance of feeling dizzy or passing out, rise slowly if you have been sitting or lying down. Be careful going up and down stairs.  · If you are having cataract surgery or other eye procedure, talk with your doctor.  · Check your blood pressure as you have been told.  · If taking for an enlarged prostate, have a rectal exam (to check prostate gland) and blood work (PSA test) as you have been told by the doctor.  · If you have a sulfa (sulfonamide) allergy, talk with your doctor.  · This drug is not approved for  use in females. If you are a female using this drug, talk with your doctor if you are pregnant, plan on getting pregnant, or are breast-feeding.  Children:   · This drug is not approved for use in children. However, the doctor may decide the benefits of taking this drug outweigh the risks. If your child has been given this drug, ask the doctor for information about the benefits and risks. Talk with the doctor if you have questions about giving this drug to your child.  What are some side effects that I need to call my doctor about right away?   WARNING/CAUTION: Even though it may be rare, some people may have very bad and sometimes deadly side effects when taking a drug. Tell your doctor or get medical help right away if you have any of the following signs or symptoms that may be related to a very bad side effect:  · Signs of an allergic reaction, like rash; hives; itching; red, swollen, blistered, or peeling skin with or without fever; wheezing; tightness in the chest or throat; trouble breathing, swallowing, or talking; unusual hoarseness; or swelling of the mouth, face, lips, tongue, or throat.  · Very bad dizziness or passing out.  · Fever, chills, or sore throat.  · Ejaculation problems.  · Call your doctor right away if you have a painful erection (hard penis) or an erection that lasts for longer than 4 hours. This may happen even when you are not having sex. If this is not treated right away, it may lead to lasting sex problems and you may not be able to have sex.  What are some other side effects of this drug?   All drugs may cause side effects. However, many people have no side effects or only have minor side effects. Call your doctor or get medical help if any of these side effects or any other side effects bother you or do not go away:  · Headache.  · Back pain.  · Diarrhea.  · Feeling dizzy, tired, or weak.  · Runny nose.  · Throat irritation.  These are not all of the side effects that may occur. If you  have questions about side effects, call your doctor. Call your doctor for medical advice about side effects.  You may report side effects to your national health agency.  You may report side effects to the FDA at 1-862.177.2381. You may also report side effects at https://www.fda.gov/medwatch.  How is this drug best taken?   Use this drug as ordered by your doctor. Read all information given to you. Follow all instructions closely.  Capsules:   · Take 30 minutes after the same meal every day.  · Swallow whole. Do not chew, open, or crush.  Modified-release tablets:   · Take with or without food.  · Swallow whole. Do not chew, break, or crush.  What do I do if I miss a dose?   · Take a missed dose as soon as you think about it.  · If it is close to the time for your next dose, skip the missed dose and go back to your normal time.  · Do not take 2 doses at the same time or extra doses.  · If you miss taking this drug for a few days in a row, call your doctor before you start taking it again.    How do I store and/or throw out this drug?   · Store at room temperature.  · Store in a dry place. Do not store in a bathroom.  · Keep all drugs in a safe place. Keep all drugs out of the reach of children and pets.  · Throw away unused or  drugs. Do not flush down a toilet or pour down a drain unless you are told to do so. Check with your pharmacist if you have questions about the best way to throw out drugs. There may be drug take-back programs in your area.    General drug facts   · If your symptoms or health problems do not get better or if they become worse, call your doctor.  · Do not share your drugs with others and do not take anyone else's drugs.  · Some drugs may have another patient information leaflet. If you have any questions about this drug, please talk with your doctor, nurse, pharmacist, or other health care provider.  · Some drugs may have another patient information leaflet. Check with your pharmacist.  If you have any questions about this drug, please talk with your doctor, nurse, pharmacist, or other health care provider.  · If you think there has been an overdose, call your poison control center or get medical care right away. Be ready to tell or show what was taken, how much, and when it happened.    Consumer Information Use and Disclaimer   This generalized information is a limited summary of diagnosis, treatment, and/or medication information. It is not meant to be comprehensive and should be used as a tool to help the user understand and/or assess potential diagnostic and treatment options. It does NOT include all information about conditions, treatments, medications, side effects, or risks that may apply to a specific patient. It is not intended to be medical advice or a substitute for the medical advice, diagnosis, or treatment of a health care provider based on the health care provider's examination and assessment of a patient's specific and unique circumstances. Patients must speak with a health care provider for complete information about their health, medical questions, and treatment options, including any risks or benefits regarding use of medications. This information does not endorse any treatments or medications as safe, effective, or approved for treating a specific patient. UpToDate, Inc. and its affiliates disclaim any warranty or liability relating to this information or the use thereof. The use of this information is governed by the Terms of Use, available at https://www.[employer].com/en/solutions/lexicomp/about/reji.  Last Reviewed Date   2019-09-06  Copyright   © 2021 UpToDate, Inc. and its affiliates and/or licensors. All rights reserved.

## 2022-02-17 NOTE — ASSESSMENT & PLAN NOTE
· Started flomax QHS, patient states he tolerated this in the past without side effects.    · IPSS 11,  ML

## 2022-03-01 DIAGNOSIS — M25.559 HIP PAIN: Primary | ICD-10-CM

## 2022-03-04 ENCOUNTER — OFFICE VISIT (OUTPATIENT)
Dept: UROLOGY | Facility: CLINIC | Age: 72
End: 2022-03-04
Payer: MEDICARE

## 2022-03-04 VITALS
TEMPERATURE: 98 F | DIASTOLIC BLOOD PRESSURE: 61 MMHG | WEIGHT: 190 LBS | SYSTOLIC BLOOD PRESSURE: 132 MMHG | OXYGEN SATURATION: 94 % | HEIGHT: 70 IN | BODY MASS INDEX: 27.2 KG/M2 | HEART RATE: 60 BPM

## 2022-03-04 DIAGNOSIS — R32 URINARY INCONTINENCE, UNSPECIFIED TYPE: ICD-10-CM

## 2022-03-04 DIAGNOSIS — N40.1 BPH WITH OBSTRUCTION/LOWER URINARY TRACT SYMPTOMS: Primary | ICD-10-CM

## 2022-03-04 DIAGNOSIS — N13.8 BPH WITH OBSTRUCTION/LOWER URINARY TRACT SYMPTOMS: Primary | ICD-10-CM

## 2022-03-04 DIAGNOSIS — N45.2 ORCHITIS: ICD-10-CM

## 2022-03-04 PROCEDURE — 3288F FALL RISK ASSESSMENT DOCD: CPT | Mod: CPTII,,, | Performed by: NURSE PRACTITIONER

## 2022-03-04 PROCEDURE — 99214 OFFICE O/P EST MOD 30 MIN: CPT | Mod: S$PBB,,, | Performed by: NURSE PRACTITIONER

## 2022-03-04 PROCEDURE — 3008F PR BODY MASS INDEX (BMI) DOCUMENTED: ICD-10-PCS | Mod: CPTII,,, | Performed by: NURSE PRACTITIONER

## 2022-03-04 PROCEDURE — 3075F SYST BP GE 130 - 139MM HG: CPT | Mod: CPTII,,, | Performed by: NURSE PRACTITIONER

## 2022-03-04 PROCEDURE — 99214 OFFICE O/P EST MOD 30 MIN: CPT | Mod: PBBFAC | Performed by: NURSE PRACTITIONER

## 2022-03-04 PROCEDURE — 3078F PR MOST RECENT DIASTOLIC BLOOD PRESSURE < 80 MM HG: ICD-10-PCS | Mod: CPTII,,, | Performed by: NURSE PRACTITIONER

## 2022-03-04 PROCEDURE — 1159F PR MEDICATION LIST DOCUMENTED IN MEDICAL RECORD: ICD-10-PCS | Mod: CPTII,,, | Performed by: NURSE PRACTITIONER

## 2022-03-04 PROCEDURE — 3078F DIAST BP <80 MM HG: CPT | Mod: CPTII,,, | Performed by: NURSE PRACTITIONER

## 2022-03-04 PROCEDURE — 1101F PR PT FALLS ASSESS DOC 0-1 FALLS W/OUT INJ PAST YR: ICD-10-PCS | Mod: CPTII,,, | Performed by: NURSE PRACTITIONER

## 2022-03-04 PROCEDURE — 99214 PR OFFICE/OUTPT VISIT, EST, LEVL IV, 30-39 MIN: ICD-10-PCS | Mod: S$PBB,,, | Performed by: NURSE PRACTITIONER

## 2022-03-04 PROCEDURE — 1101F PT FALLS ASSESS-DOCD LE1/YR: CPT | Mod: CPTII,,, | Performed by: NURSE PRACTITIONER

## 2022-03-04 PROCEDURE — 1159F MED LIST DOCD IN RCRD: CPT | Mod: CPTII,,, | Performed by: NURSE PRACTITIONER

## 2022-03-04 PROCEDURE — 3288F PR FALLS RISK ASSESSMENT DOCUMENTED: ICD-10-PCS | Mod: CPTII,,, | Performed by: NURSE PRACTITIONER

## 2022-03-04 PROCEDURE — 51798 US URINE CAPACITY MEASURE: CPT | Mod: PBBFAC | Performed by: NURSE PRACTITIONER

## 2022-03-04 PROCEDURE — 3075F PR MOST RECENT SYSTOLIC BLOOD PRESS GE 130-139MM HG: ICD-10-PCS | Mod: CPTII,,, | Performed by: NURSE PRACTITIONER

## 2022-03-04 PROCEDURE — 3008F BODY MASS INDEX DOCD: CPT | Mod: CPTII,,, | Performed by: NURSE PRACTITIONER

## 2022-03-04 RX ORDER — EZETIMIBE 10 MG/1
10 TABLET ORAL DAILY
COMMUNITY

## 2022-03-04 NOTE — ASSESSMENT & PLAN NOTE
· Controlled, Continue flomax  · IPSS down to 4 from 11, PVR down to 86 ml from 184  · F/u 6 months

## 2022-03-04 NOTE — PROGRESS NOTES
"Subjective:       Patient ID: Sami Kerns is a 72 y.o. male.    Chief Complaint: Follow-up    Mr. Kerns is here today for iniial evaluation for urinary incontinence, by referral from Dr. Wade ([facility]).  Patient is not on Urology or OAB meds presently.  He is followed by Dr. Melchor for CHF and s/p triple bypass.  He denies RAULITO, UUI or CHARANJIT.  IPSS 14, which reflects significant nocturia urgency, weak stream and not feeling as though he is fully emptying.  Voices c/o terminal dribbling after voiding, occasional dysuria and "a little scrotal tenderness".  PVR 77 ml.  Left testicular pain and mild swelling on PE, with approximate 40g asymptomatic prostate.  Lab Results               PSA                      1.940               09/15/2021              -------(1/20/2022)--------------------------------------------------------------    Mr. Kerns has RTC for 2 week f/u following 10 day course of doxycycline for orchitis.  Patient states left testicle still "mildly tender".    C/o right lower flank pain with radiation to back.  Patient states told by Dr. Melchor, Cardiology, that "his kidney is weak".  Other c/o urinary dribbling.  IPSS 11, reflecting nocturia x 4, weak stream and persistent terminal dribble.  Other c/o intermittent right flank pain, without past history of stone disease or hematuria.   He does not feel course of doxycycline was helpful.  PVR 14 last visit.   PVR significantly worse at 184 ml this morning, 77 last visit.  He is not currently taking urology meds.  (2/16/2022)--------------------    Mr. Kerns is here today for f/u after starting flomax qhs for uncontrolled BPH with LUTS.  Last IPSS 11, down to 4.  PVR improved from 184 ml to 86ml since starting flomax.  Patient states obvious improvement in flow, incontinence and nocturia (2).  No new complaints today.  PSA and prostate screening will be due after September 2022.  Last urine cx 1/20 negative, UA 2/1 normal.  " (3/4/2022)--------------------------------------------------------        Review of Systems   Constitutional: Negative.    Respiratory: Negative.    Cardiovascular: Negative.    Gastrointestinal: Negative.    Genitourinary: Negative for decreased urine volume, difficulty urinating, dysuria, enuresis, flank pain, frequency, genital sores, hematuria, penile discharge, penile pain, penile swelling, scrotal swelling, testicular pain and urgency.        Overall significant improvement since starting flomax   Musculoskeletal: Negative for back pain.   Skin: Negative for rash.   Neurological:        Normal voiding sensation       Objective:      Physical Exam  Vitals and nursing note reviewed.   Constitutional:       General: He is not in acute distress.     Appearance: Normal appearance. He is normal weight. He is not ill-appearing, toxic-appearing or diaphoretic.   Eyes:      General: No scleral icterus.  Cardiovascular:      Rate and Rhythm: Normal rate.   Pulmonary:      Effort: Pulmonary effort is normal.   Abdominal:      General: There is no distension.      Palpations: Abdomen is soft. There is no mass.      Tenderness: There is no abdominal tenderness. There is no right CVA tenderness or left CVA tenderness.      Hernia: No hernia is present.   Musculoskeletal:      Right lower leg: No edema.      Left lower leg: No edema.   Skin:     General: Skin is warm and dry.      Coloration: Skin is not jaundiced or pale.      Findings: No rash.   Neurological:      Mental Status: He is alert and oriented to person, place, and time. Mental status is at baseline.   Psychiatric:         Mood and Affect: Mood normal.         Behavior: Behavior normal.         Judgment: Judgment normal.         Assessment:       1. BPH with obstruction/lower urinary tract symptoms    2. Orchitis    3. Urinary incontinence, unspecified type        Plan:       Orchitis  Resolved with tx    Urinary incontinence  · controlled    BPH with  obstruction/lower urinary tract symptoms  · Controlled, Continue flomax  · IPSS down to 4 from 11, PVR down to 86 ml from 184  · F/u 6 months

## 2022-03-28 NOTE — PROGRESS NOTES
[facility]     PATIENT NAME: Sami Kerns   : 1950    AGE: 72 y.o. DATE: 2022   MRN: [id number]        Reason for Visit / Chief Complaint: Medicare AWV (Initial [facility] Medicare AWV visit )        Sami Kerns presents for a  Subsequent [facility] Medicare AWV today.     The following components were reviewed and updated:    Medical/Social/Family History:  Past Medical History:   Diagnosis Date    Adenomatous polyp of ascending colon 2021    Adenomatous polyp of descending colon 2021    Adenomatous polyp of transverse colon 2021    Anticoagulant long-term use     Blood in stool 2021    CAD (coronary artery disease)     CHF (congestive heart failure)     COPD (chronic obstructive pulmonary disease)     GERD (gastroesophageal reflux disease)     Gout     HTN (hypertension)     Hyperlipidemia     Iron deficiency anemia due to chronic blood loss 2021    Orchitis 2022    Osteoarthritis     Polyp of splenic flexure of colon 2021    Screening for colon cancer 2021    Urinary incontinence 2022        Family History   Problem Relation Age of Onset    Hypertension Mother         Past Surgical History:   Procedure Laterality Date    CORONARY ARTERY BYPASS GRAFT (CABG)         Social History     Tobacco Use   Smoking Status Current Some Day Smoker    Packs/day: 1.00    Years: 55.00    Pack years: 55.00    Types: Cigarettes   Smokeless Tobacco Never Used   Tobacco Comment    at [employer] smokes 1 cig/day trying to quit but refuses information       Social History     Substance and Sexual Activity   Alcohol Use Never         · Allergies and Current Medications   Review of patient's allergies indicates:  No Known Allergies    Current Outpatient Medications:     amiodarone (PACERONE) 200 MG Tab, Take by mouth once daily., Disp: , Rfl:     ascorbic acid, vitamin C, (VITAMIN C) 1000 MG tablet, Take 1,000 mg  by mouth once daily., Disp: , Rfl:     aspirin (ECOTRIN) 81 MG EC tablet, Take 81 mg by mouth once daily., Disp: , Rfl:     atorvastatin (LIPITOR) 80 MG tablet, Take 1 tablet by mouth once daily., Disp: , Rfl:     cilostazoL (PLETAL) 100 MG Tab, Take 100 mg by mouth 2 (two) times daily., Disp: , Rfl:     clopidogreL (PLAVIX) 75 mg tablet, Take 75 mg by mouth once daily., Disp: , Rfl:     ezetimibe (ZETIA) 10 mg tablet, Take 10 mg by mouth once daily., Disp: , Rfl:     furosemide (LASIX) 40 MG tablet, Take 40 mg by mouth once daily., Disp: , Rfl:     hydrALAZINE (APRESOLINE) 50 MG tablet, Take 50 mg by mouth 3 (three) times daily., Disp: , Rfl:     losartan (COZAAR) 100 MG tablet, Take 100 mg by mouth once daily., Disp: , Rfl:     metoprolol succinate (TOPROL-XL) 50 MG 24 hr tablet, Take 1 tablet by mouth once daily., Disp: , Rfl:     tamsulosin (FLOMAX) 0.4 mg Cap, Take 1 capsule (0.4 mg total) by mouth once daily., Disp: 90 capsule, Rfl: 3    naproxen (NAPROSYN) 500 MG tablet, Take 1 tablet (500 mg total) by mouth 2 (two) times daily as needed (pain). (Patient not taking: No sig reported), Disp: 20 tablet, Rfl: 1      · Health Risk Assessment   Fall Risk:  Not at risk   Obesity: BMI Body mass index is 27.41 kg/m².   Advance Directive:no, declines information   Depression: PHQ9- 1   HTN:DASH diet, exercise, weight management, med compliance, home BP monitoring, and follow-up discussed.  STI:not at risk   Statin Use: yes      · Health Maintenance   Last eye exam: states saw Dr. Hager in  2000   Last CV screen with lipids: 8/23/17 done on this visit   Diabetes screening with fasting glucose or A1c: 9/8/21   Colonoscopy: 11/29/21   Flu Vaccine: 10/5/21   Pneumonia vaccines: Pneu 23- 6/4/20 , Pneu 13 -2/1/22   COVID vaccine: (moderna)-3/1/21, 3/20/21, 8/24/21   Hep B vaccine: na   DEXA: na   Last PSA screen: 9/15/21  AAA screening: na   HIV Screeing: not at risk  Hepatitis C Screen: Due now  Low Dose CT  Scan: 7/2/20    Health Maintenance Topics with due status: Not Due       Topic Last Completion Date    Colorectal Cancer Screening 11/29/2021     Health Maintenance Due   Topic Date Due    Hepatitis C Screening  Never done    Lipid Panel  Never done    COVID-19 Vaccine (1) Never done    TETANUS VACCINE  Never done    Shingles Vaccine (1 of 2) Never done    LDCT Lung Screen  07/02/2021         Lab results available in Epic or see dates from [facility] above:   No results found for: CHOL  No results found for: HDL  No results found for: LDLCALC  No results found for: TRIG  No results found for: CHOLHDL    No results found for: LABA1C, HGBA1C    Sodium   Date Value Ref Range Status   09/08/2021 139 136 - 145 mmol/L Final     Potassium   Date Value Ref Range Status   09/08/2021 3.6 3.5 - 5.1 mmol/L Final     Chloride   Date Value Ref Range Status   09/08/2021 105 98 - 107 mmol/L Final     CO2   Date Value Ref Range Status   09/08/2021 27 21 - 32 mmol/L Final     Glucose   Date Value Ref Range Status   09/08/2021 113 (H) 74 - 106 mg/dL Final     BUN   Date Value Ref Range Status   12/08/2021 20 (H) 7 - 18 mg/dL Final     Creatinine   Date Value Ref Range Status   12/08/2021 1.70 (H) 0.70 - 1.30 mg/dL Final     Calcium   Date Value Ref Range Status   09/08/2021 9.6 8.5 - 10.1 mg/dL Final     Anion Gap   Date Value Ref Range Status   09/08/2021 11 7 - 16 mmol/L Final     eGFR    Date Value Ref Range Status   12/08/2021 51 (L) >=60 mL/min/1.73m² Final         Lab Results   Component Value Date    PSA 1.940 09/15/2021    (Delete this line if female pt)       Incontinence  Bowel: no   Bladder: no      · Care Team  Dr. Weems -PCP                 Dr. Melchor - cardiology                 Dr. Wade- neurology                            NANCY Wade FNP - urology  **See Completed Assessments for Annual Wellness visit within the encounter summary    The following assessments were completed & reviewed:  · Depression  "Screening  · Cognitive function Screening  · Timed Get Up Test  · Whisper Test  · Vision Screen  · Health Risk Assessment  · Checklist of ADLs and IADLs        Objective  Vitals:    03/29/22 1105 03/29/22 1125   BP: (!) 118/56 122/60   Pulse: 78    Resp: 18    Temp: 98.8 °F (37.1 °C)    TempSrc: Oral    SpO2: 95%    Weight: 86.6 kg (191 lb)    Height: 5' 10" (1.778 m)    PainSc: 0-No pain       Body mass index is 27.41 kg/m².  Ideal body weight: 73 kg (160 lb 15 oz)       Physical Exam  Vitals reviewed.   Constitutional:       Appearance: Normal appearance.   HENT:      Head: Normocephalic.      Mouth/Throat:      Mouth: Mucous membranes are moist.   Cardiovascular:      Rate and Rhythm: Normal rate and regular rhythm.      Pulses: Normal pulses.      Heart sounds: Normal heart sounds. No murmur heard.  Pulmonary:      Effort: Pulmonary effort is normal. No respiratory distress.      Breath sounds: Normal breath sounds. No wheezing, rhonchi or rales.   Abdominal:      Palpations: Abdomen is soft.      Tenderness: There is no abdominal tenderness.   Musculoskeletal:         General: Normal range of motion.      Cervical back: Neck supple.   Skin:     General: Skin is warm and dry.   Neurological:      Mental Status: He is alert and oriented to person, place, and time.   Psychiatric:         Mood and Affect: Mood normal.         Behavior: Behavior normal.           Assessment:     1. Essential hypertension    2. Hyperlipidemia, unspecified hyperlipidemia type  - Lipid Panel; Future  - Lipid Panel    3. BMI 27.0-27.9,adult    4. Screening for viral disease  - Hepatitis C Antibody; Future  - Hepatitis C Antibody         Plan:    Referrals: None       Tobacco Use / Abuse  Counseled on the dangers of tobacco use, and was advised to quit. Reviewed strategies to maximize success, including removing cigarettes and smoking materials from environment and provided with     Discussed and provided with a screening schedule and " personal prevention plan in accordance with USPSTF age appropriate recommendations and Medicare screening guidelines.   Education, counseling, and referrals were provided as needed.  After Visit Summary printed and given to patient which includes written education and a list of any referrals if indicated.     Education including diet, exercise, falls, smoking cessation  and advanced directives discussed with patient and patient verbalized understanding.      F/u plan for yearly AWV.    Signature: Kelly MORALES-BC

## 2022-03-29 ENCOUNTER — OFFICE VISIT (OUTPATIENT)
Dept: FAMILY MEDICINE | Facility: CLINIC | Age: 72
End: 2022-03-29
Payer: MEDICARE

## 2022-03-29 VITALS
DIASTOLIC BLOOD PRESSURE: 60 MMHG | HEIGHT: 70 IN | OXYGEN SATURATION: 95 % | RESPIRATION RATE: 18 BRPM | TEMPERATURE: 99 F | SYSTOLIC BLOOD PRESSURE: 122 MMHG | WEIGHT: 191 LBS | HEART RATE: 78 BPM | BODY MASS INDEX: 27.35 KG/M2

## 2022-03-29 DIAGNOSIS — E78.5 HYPERLIPIDEMIA, UNSPECIFIED HYPERLIPIDEMIA TYPE: ICD-10-CM

## 2022-03-29 DIAGNOSIS — Z11.59 SCREENING FOR VIRAL DISEASE: ICD-10-CM

## 2022-03-29 DIAGNOSIS — I10 ESSENTIAL HYPERTENSION: Primary | ICD-10-CM

## 2022-03-29 LAB
CHOLEST SERPL-MCNC: 103 MG/DL (ref 0–200)
CHOLEST/HDLC SERPL: 2.9 {RATIO}
HCV AB SER QL: NORMAL
HDLC SERPL-MCNC: 35 MG/DL (ref 40–60)
LDLC SERPL CALC-MCNC: 43 MG/DL
LDLC/HDLC SERPL: 1.2 {RATIO}
NONHDLC SERPL-MCNC: 68 MG/DL
TRIGL SERPL-MCNC: 124 MG/DL (ref 35–150)
VLDLC SERPL-MCNC: 25 MG/DL

## 2022-03-29 PROCEDURE — 3008F BODY MASS INDEX DOCD: CPT | Mod: ,,, | Performed by: NURSE PRACTITIONER

## 2022-03-29 PROCEDURE — 3078F PR MOST RECENT DIASTOLIC BLOOD PRESSURE < 80 MM HG: ICD-10-PCS | Mod: ,,, | Performed by: NURSE PRACTITIONER

## 2022-03-29 PROCEDURE — 3008F PR BODY MASS INDEX (BMI) DOCUMENTED: ICD-10-PCS | Mod: ,,, | Performed by: NURSE PRACTITIONER

## 2022-03-29 PROCEDURE — G0439 PR MEDICARE ANNUAL WELLNESS SUBSEQUENT VISIT: ICD-10-PCS | Mod: ,,, | Performed by: NURSE PRACTITIONER

## 2022-03-29 PROCEDURE — 1101F PT FALLS ASSESS-DOCD LE1/YR: CPT | Mod: ,,, | Performed by: NURSE PRACTITIONER

## 2022-03-29 PROCEDURE — 3074F PR MOST RECENT SYSTOLIC BLOOD PRESSURE < 130 MM HG: ICD-10-PCS | Mod: ,,, | Performed by: NURSE PRACTITIONER

## 2022-03-29 PROCEDURE — G0439 PPPS, SUBSEQ VISIT: HCPCS | Mod: ,,, | Performed by: NURSE PRACTITIONER

## 2022-03-29 PROCEDURE — 1159F PR MEDICATION LIST DOCUMENTED IN MEDICAL RECORD: ICD-10-PCS | Mod: ,,, | Performed by: NURSE PRACTITIONER

## 2022-03-29 PROCEDURE — 86803 HEPATITIS C ANTIBODY: ICD-10-PCS | Mod: ,,, | Performed by: CLINICAL MEDICAL LABORATORY

## 2022-03-29 PROCEDURE — 1126F AMNT PAIN NOTED NONE PRSNT: CPT | Mod: ,,, | Performed by: NURSE PRACTITIONER

## 2022-03-29 PROCEDURE — 1126F PR PAIN SEVERITY QUANTIFIED, NO PAIN PRESENT: ICD-10-PCS | Mod: ,,, | Performed by: NURSE PRACTITIONER

## 2022-03-29 PROCEDURE — 80061 LIPID PANEL: CPT | Mod: ,,, | Performed by: CLINICAL MEDICAL LABORATORY

## 2022-03-29 PROCEDURE — 1160F RVW MEDS BY RX/DR IN RCRD: CPT | Mod: ,,, | Performed by: NURSE PRACTITIONER

## 2022-03-29 PROCEDURE — 3288F FALL RISK ASSESSMENT DOCD: CPT | Mod: ,,, | Performed by: NURSE PRACTITIONER

## 2022-03-29 PROCEDURE — 1160F PR REVIEW ALL MEDS BY PRESCRIBER/CLIN PHARMACIST DOCUMENTED: ICD-10-PCS | Mod: ,,, | Performed by: NURSE PRACTITIONER

## 2022-03-29 PROCEDURE — 3078F DIAST BP <80 MM HG: CPT | Mod: ,,, | Performed by: NURSE PRACTITIONER

## 2022-03-29 PROCEDURE — 1101F PR PT FALLS ASSESS DOC 0-1 FALLS W/OUT INJ PAST YR: ICD-10-PCS | Mod: ,,, | Performed by: NURSE PRACTITIONER

## 2022-03-29 PROCEDURE — 3074F SYST BP LT 130 MM HG: CPT | Mod: ,,, | Performed by: NURSE PRACTITIONER

## 2022-03-29 PROCEDURE — 80061 LIPID PANEL: ICD-10-PCS | Mod: ,,, | Performed by: CLINICAL MEDICAL LABORATORY

## 2022-03-29 PROCEDURE — 1159F MED LIST DOCD IN RCRD: CPT | Mod: ,,, | Performed by: NURSE PRACTITIONER

## 2022-03-29 PROCEDURE — 3288F PR FALLS RISK ASSESSMENT DOCUMENTED: ICD-10-PCS | Mod: ,,, | Performed by: NURSE PRACTITIONER

## 2022-03-29 PROCEDURE — 86803 HEPATITIS C AB TEST: CPT | Mod: ,,, | Performed by: CLINICAL MEDICAL LABORATORY

## 2022-03-29 NOTE — PATIENT INSTRUCTIONS
Counseling and Referral of Other Preventative  (Italic type indicates deductible and co-insurance are waived)    Patient Name: Sami Kerns  Today's Date: 3/29/2022    Health Maintenance         Date Due Completion Date    Hepatitis C Screening Never done ---    Lipid Panel Never done ---    COVID-19 Vaccine (1) Never done ---    TETANUS VACCINE Never done ---    Shingles Vaccine (1 of 2) Never done ---    LDCT Lung Screen 07/02/2021 7/2/2020    Colorectal Cancer Screening 11/29/2024 11/29/2021          No orders of the defined types were placed in this encounter.

## 2022-04-12 ENCOUNTER — APPOINTMENT (OUTPATIENT)
Dept: RADIOLOGY | Facility: CLINIC | Age: 72
End: 2022-04-12
Attending: INTERNAL MEDICINE
Payer: MEDICARE

## 2022-04-12 ENCOUNTER — OFFICE VISIT (OUTPATIENT)
Dept: FAMILY MEDICINE | Facility: CLINIC | Age: 72
End: 2022-04-12
Payer: MEDICARE

## 2022-04-12 VITALS
HEIGHT: 70 IN | RESPIRATION RATE: 18 BRPM | DIASTOLIC BLOOD PRESSURE: 60 MMHG | TEMPERATURE: 97 F | BODY MASS INDEX: 27.49 KG/M2 | SYSTOLIC BLOOD PRESSURE: 126 MMHG | WEIGHT: 192 LBS | HEART RATE: 70 BPM | OXYGEN SATURATION: 97 %

## 2022-04-12 DIAGNOSIS — R06.00 DYSPNEA, UNSPECIFIED TYPE: Primary | ICD-10-CM

## 2022-04-12 DIAGNOSIS — R13.19 OTHER DYSPHAGIA: ICD-10-CM

## 2022-04-12 DIAGNOSIS — E01.0 THYROMEGALY: ICD-10-CM

## 2022-04-12 DIAGNOSIS — G47.33 OSA (OBSTRUCTIVE SLEEP APNEA): ICD-10-CM

## 2022-04-12 DIAGNOSIS — R06.00 DYSPNEA, UNSPECIFIED TYPE: ICD-10-CM

## 2022-04-12 LAB
T4 SERPL-MCNC: 16.2 ΜG/DL (ref 4.5–12.1)
TSH SERPL DL<=0.005 MIU/L-ACNC: 0.01 UIU/ML (ref 0.36–3.74)

## 2022-04-12 PROCEDURE — 3288F FALL RISK ASSESSMENT DOCD: CPT | Mod: ,,, | Performed by: INTERNAL MEDICINE

## 2022-04-12 PROCEDURE — 1159F MED LIST DOCD IN RCRD: CPT | Mod: ,,, | Performed by: INTERNAL MEDICINE

## 2022-04-12 PROCEDURE — 3074F SYST BP LT 130 MM HG: CPT | Mod: ,,, | Performed by: INTERNAL MEDICINE

## 2022-04-12 PROCEDURE — 84436 ASSAY OF TOTAL THYROXINE: CPT | Mod: ,,, | Performed by: CLINICAL MEDICAL LABORATORY

## 2022-04-12 PROCEDURE — 1160F PR REVIEW ALL MEDS BY PRESCRIBER/CLIN PHARMACIST DOCUMENTED: ICD-10-PCS | Mod: ,,, | Performed by: INTERNAL MEDICINE

## 2022-04-12 PROCEDURE — 99214 PR OFFICE/OUTPT VISIT, EST, LEVL IV, 30-39 MIN: ICD-10-PCS | Mod: ,,, | Performed by: INTERNAL MEDICINE

## 2022-04-12 PROCEDURE — 3008F PR BODY MASS INDEX (BMI) DOCUMENTED: ICD-10-PCS | Mod: ,,, | Performed by: INTERNAL MEDICINE

## 2022-04-12 PROCEDURE — 3078F PR MOST RECENT DIASTOLIC BLOOD PRESSURE < 80 MM HG: ICD-10-PCS | Mod: ,,, | Performed by: INTERNAL MEDICINE

## 2022-04-12 PROCEDURE — 99214 OFFICE O/P EST MOD 30 MIN: CPT | Mod: ,,, | Performed by: INTERNAL MEDICINE

## 2022-04-12 PROCEDURE — 1159F PR MEDICATION LIST DOCUMENTED IN MEDICAL RECORD: ICD-10-PCS | Mod: ,,, | Performed by: INTERNAL MEDICINE

## 2022-04-12 PROCEDURE — 1101F PT FALLS ASSESS-DOCD LE1/YR: CPT | Mod: ,,, | Performed by: INTERNAL MEDICINE

## 2022-04-12 PROCEDURE — 1126F PR PAIN SEVERITY QUANTIFIED, NO PAIN PRESENT: ICD-10-PCS | Mod: ,,, | Performed by: INTERNAL MEDICINE

## 2022-04-12 PROCEDURE — 1160F RVW MEDS BY RX/DR IN RCRD: CPT | Mod: ,,, | Performed by: INTERNAL MEDICINE

## 2022-04-12 PROCEDURE — 1126F AMNT PAIN NOTED NONE PRSNT: CPT | Mod: ,,, | Performed by: INTERNAL MEDICINE

## 2022-04-12 PROCEDURE — 3074F PR MOST RECENT SYSTOLIC BLOOD PRESSURE < 130 MM HG: ICD-10-PCS | Mod: ,,, | Performed by: INTERNAL MEDICINE

## 2022-04-12 PROCEDURE — 3288F PR FALLS RISK ASSESSMENT DOCUMENTED: ICD-10-PCS | Mod: ,,, | Performed by: INTERNAL MEDICINE

## 2022-04-12 PROCEDURE — 1101F PR PT FALLS ASSESS DOC 0-1 FALLS W/OUT INJ PAST YR: ICD-10-PCS | Mod: ,,, | Performed by: INTERNAL MEDICINE

## 2022-04-12 PROCEDURE — 84443 ASSAY THYROID STIM HORMONE: CPT | Mod: ,,, | Performed by: CLINICAL MEDICAL LABORATORY

## 2022-04-12 PROCEDURE — 3008F BODY MASS INDEX DOCD: CPT | Mod: ,,, | Performed by: INTERNAL MEDICINE

## 2022-04-12 PROCEDURE — 84443 TSH: ICD-10-PCS | Mod: ,,, | Performed by: CLINICAL MEDICAL LABORATORY

## 2022-04-12 PROCEDURE — 71046 X-RAY EXAM CHEST 2 VIEWS: CPT | Mod: TC,RHCUB | Performed by: INTERNAL MEDICINE

## 2022-04-12 PROCEDURE — 3078F DIAST BP <80 MM HG: CPT | Mod: ,,, | Performed by: INTERNAL MEDICINE

## 2022-04-12 PROCEDURE — 84436 T4: ICD-10-PCS | Mod: ,,, | Performed by: CLINICAL MEDICAL LABORATORY

## 2022-04-12 NOTE — PROGRESS NOTES
Subjective:       Patient ID: Sami Kerns is a 72 y.o. male.    Chief Complaint: Thyroid Problem and Hospital Follow Up    Patient is here today for a follow up evaluation. Patient states he had an ED visit 2 weeks ago. Patient is concerned that he may have enlarged thyroids. Will order TSH today. Will order STAT US Thyroid today. Patient has a complaint of difficulty breathing and episodes of dyspnea when sleeping. Will refer to Pulmonology. Will refer to Sleep Study. Will order PFT'S today. Will order PA/LAT Chest today. Patient has a complaint of difficulty swallowing. Patient states it is hard to swallow his pills. Will refer to ENT. Will also refer to Speech Therapy for Swallow Evaluation. Will follow in 2 weeks.       Current Medications:    Current Outpatient Medications:     amiodarone (PACERONE) 200 MG Tab, Take by mouth once daily., Disp: , Rfl:     ascorbic acid, vitamin C, (VITAMIN C) 1000 MG tablet, Take 1,000 mg by mouth once daily., Disp: , Rfl:     aspirin (ECOTRIN) 81 MG EC tablet, Take 81 mg by mouth once daily., Disp: , Rfl:     atorvastatin (LIPITOR) 80 MG tablet, Take 1 tablet by mouth once daily., Disp: , Rfl:     cilostazoL (PLETAL) 100 MG Tab, Take 100 mg by mouth 2 (two) times daily., Disp: , Rfl:     clopidogreL (PLAVIX) 75 mg tablet, Take 75 mg by mouth once daily., Disp: , Rfl:     ezetimibe (ZETIA) 10 mg tablet, Take 10 mg by mouth once daily., Disp: , Rfl:     furosemide (LASIX) 40 MG tablet, Take 40 mg by mouth once daily., Disp: , Rfl:     hydrALAZINE (APRESOLINE) 50 MG tablet, Take 50 mg by mouth 3 (three) times daily., Disp: , Rfl:     losartan (COZAAR) 100 MG tablet, Take 100 mg by mouth once daily., Disp: , Rfl:     metoprolol succinate (TOPROL-XL) 50 MG 24 hr tablet, Take 1 tablet by mouth once daily., Disp: , Rfl:     naproxen (NAPROSYN) 500 MG tablet, Take 1 tablet (500 mg total) by mouth 2 (two) times daily as needed (pain)., Disp: 20 tablet, Rfl: 1     tamsulosin (FLOMAX) 0.4 mg Cap, Take 1 capsule (0.4 mg total) by mouth once daily., Disp: 90 capsule, Rfl: 3    Last Labs:     Office Visit on 03/29/2022   Component Date Value    Hepatitis C Ab 03/29/2022 Non-Reactive     Triglycerides 03/29/2022 124     Cholesterol 03/29/2022 103     HDL Cholesterol 03/29/2022 35 (A)    Cholesterol/HDL Ratio (R* 03/29/2022 2.9     Non-HDL 03/29/2022 68     LDL Calculated 03/29/2022 43     LDL/HDL 03/29/2022 1.2     VLDL 03/29/2022 25        Last Imaging:  X-Ray Chest PA And Lateral  Narrative: EXAMINATION:  XR CHEST PA AND LATERAL    CLINICAL HISTORY:  Dyspnea, unspecified    COMPARISON:  8 September 2021    FINDINGS:  The heart and mediastinum are normal in size and configuration.  The pulmonary vascularity is normal in caliber.  No lung infiltrates, effusions, pneumothorax or other abnormality is demonstrated.  Impression: No evidence of acute cardiopulmonary disease.    Electronically signed by: Lg Ford  Date:    04/12/2022  Time:    15:00         Review of Systems   HENT: Positive for trouble swallowing.    Respiratory: Positive for shortness of breath.    Psychiatric/Behavioral: Positive for sleep disturbance.   All other systems reviewed and are negative.        Objective:      Physical Exam  Constitutional:       Appearance: Normal appearance. He is normal weight.   Cardiovascular:      Rate and Rhythm: Normal rate and regular rhythm.      Pulses: Normal pulses.      Heart sounds: Normal heart sounds.   Pulmonary:      Effort: Pulmonary effort is normal.      Breath sounds: Normal breath sounds.   Abdominal:      General: Abdomen is flat. Bowel sounds are normal.      Palpations: Abdomen is soft.   Musculoskeletal:         General: Normal range of motion.      Cervical back: Normal range of motion and neck supple.   Skin:     General: Skin is warm and dry.   Neurological:      General: No focal deficit present.      Mental Status: He is alert and oriented  to person, place, and time. Mental status is at baseline.         Assessment:       1. Dyspnea, unspecified type  TSH    X-Ray Chest PA And Lateral    TSH    Pulmonary function test    Ambulatory referral/consult to Pulmonology    SLP eval of swallow & oral function    Sleep Study Unattended/Attended   2. Thyromegaly  T4    T4    US Soft Tissue Head Neck Thyroid    Ambulatory referral/consult to ENT   3. Other dysphagia     4. RAULITO (obstructive sleep apnea)          Plan:         Sami was seen today for thyroid problem and hospital follow up.    Diagnoses and all orders for this visit:    Dyspnea, unspecified type  -     TSH; Future  -     X-Ray Chest PA And Lateral; Future  -     TSH  -     Pulmonary function test; Future  -     Ambulatory referral/consult to Pulmonology; Future  -     SLP eval of swallow & oral function; Future  -     Sleep Study Unattended/Attended; Future    Thyromegaly  -     T4; Future  -     T4  -     US Soft Tissue Head Neck Thyroid; Future  -     Ambulatory referral/consult to ENT; Future    Other dysphagia    RAULITO (obstructive sleep apnea)

## 2022-04-12 NOTE — PATIENT INSTRUCTIONS
Sami was seen today for thyroid problem and hospital follow up.    Diagnoses and all orders for this visit:    Dyspnea, unspecified type  -     TSH; Future  -     X-Ray Chest PA And Lateral; Future  -     TSH  -     Pulmonary function test; Future  -     Ambulatory referral/consult to Pulmonology; Future  -     SLP eval of swallow & oral function; Future  -     Sleep Study Unattended/Attended; Future    Thyromegaly  -     T4; Future  -     T4  -     US Soft Tissue Head Neck Thyroid; Future  -     Ambulatory referral/consult to ENT; Future    Other dysphagia    RAULITO (obstructive sleep apnea)

## 2022-04-15 ENCOUNTER — TELEPHONE (OUTPATIENT)
Dept: FAMILY MEDICINE | Facility: CLINIC | Age: 72
End: 2022-04-15
Payer: MEDICARE

## 2022-04-15 NOTE — TELEPHONE ENCOUNTER
----- Message from Michael Weems MD sent at 4/14/2022 11:30 AM CDT -----  Need to see Tuesday   [facility]   thyroid labs

## 2022-04-15 NOTE — TELEPHONE ENCOUNTER
Attempt to call patient, no answer.  9:43 AM Patient call back, made appt for Tuesday the 29th.  Freda Mancia LPN

## 2022-04-19 ENCOUNTER — OFFICE VISIT (OUTPATIENT)
Dept: FAMILY MEDICINE | Facility: CLINIC | Age: 72
End: 2022-04-19
Payer: MEDICARE

## 2022-04-19 VITALS
WEIGHT: 191 LBS | OXYGEN SATURATION: 99 % | HEIGHT: 70 IN | SYSTOLIC BLOOD PRESSURE: 130 MMHG | TEMPERATURE: 97 F | HEART RATE: 59 BPM | DIASTOLIC BLOOD PRESSURE: 62 MMHG | BODY MASS INDEX: 27.35 KG/M2 | RESPIRATION RATE: 18 BRPM

## 2022-04-19 DIAGNOSIS — G47.33 OSA (OBSTRUCTIVE SLEEP APNEA): ICD-10-CM

## 2022-04-19 DIAGNOSIS — E03.9 HYPOTHYROIDISM, UNSPECIFIED TYPE: ICD-10-CM

## 2022-04-19 DIAGNOSIS — R13.19 OTHER DYSPHAGIA: ICD-10-CM

## 2022-04-19 DIAGNOSIS — R06.02 SHORTNESS OF BREATH: Primary | ICD-10-CM

## 2022-04-19 PROCEDURE — 3288F FALL RISK ASSESSMENT DOCD: CPT | Mod: ,,, | Performed by: INTERNAL MEDICINE

## 2022-04-19 PROCEDURE — 1160F RVW MEDS BY RX/DR IN RCRD: CPT | Mod: ,,, | Performed by: INTERNAL MEDICINE

## 2022-04-19 PROCEDURE — 3078F PR MOST RECENT DIASTOLIC BLOOD PRESSURE < 80 MM HG: ICD-10-PCS | Mod: ,,, | Performed by: INTERNAL MEDICINE

## 2022-04-19 PROCEDURE — 99214 PR OFFICE/OUTPT VISIT, EST, LEVL IV, 30-39 MIN: ICD-10-PCS | Mod: ,,, | Performed by: INTERNAL MEDICINE

## 2022-04-19 PROCEDURE — 1159F MED LIST DOCD IN RCRD: CPT | Mod: ,,, | Performed by: INTERNAL MEDICINE

## 2022-04-19 PROCEDURE — 1101F PT FALLS ASSESS-DOCD LE1/YR: CPT | Mod: ,,, | Performed by: INTERNAL MEDICINE

## 2022-04-19 PROCEDURE — 99214 OFFICE O/P EST MOD 30 MIN: CPT | Mod: ,,, | Performed by: INTERNAL MEDICINE

## 2022-04-19 PROCEDURE — 1160F PR REVIEW ALL MEDS BY PRESCRIBER/CLIN PHARMACIST DOCUMENTED: ICD-10-PCS | Mod: ,,, | Performed by: INTERNAL MEDICINE

## 2022-04-19 PROCEDURE — 1101F PR PT FALLS ASSESS DOC 0-1 FALLS W/OUT INJ PAST YR: ICD-10-PCS | Mod: ,,, | Performed by: INTERNAL MEDICINE

## 2022-04-19 PROCEDURE — 3078F DIAST BP <80 MM HG: CPT | Mod: ,,, | Performed by: INTERNAL MEDICINE

## 2022-04-19 PROCEDURE — 3288F PR FALLS RISK ASSESSMENT DOCUMENTED: ICD-10-PCS | Mod: ,,, | Performed by: INTERNAL MEDICINE

## 2022-04-19 PROCEDURE — 3008F BODY MASS INDEX DOCD: CPT | Mod: ,,, | Performed by: INTERNAL MEDICINE

## 2022-04-19 PROCEDURE — 3008F PR BODY MASS INDEX (BMI) DOCUMENTED: ICD-10-PCS | Mod: ,,, | Performed by: INTERNAL MEDICINE

## 2022-04-19 PROCEDURE — 1159F PR MEDICATION LIST DOCUMENTED IN MEDICAL RECORD: ICD-10-PCS | Mod: ,,, | Performed by: INTERNAL MEDICINE

## 2022-04-19 PROCEDURE — 3075F PR MOST RECENT SYSTOLIC BLOOD PRESS GE 130-139MM HG: ICD-10-PCS | Mod: ,,, | Performed by: INTERNAL MEDICINE

## 2022-04-19 PROCEDURE — 3075F SYST BP GE 130 - 139MM HG: CPT | Mod: ,,, | Performed by: INTERNAL MEDICINE

## 2022-04-19 RX ORDER — PROPRANOLOL HYDROCHLORIDE 20 MG/1
20 TABLET ORAL 2 TIMES DAILY
Qty: 60 TABLET | Refills: 1 | Status: SHIPPED | OUTPATIENT
Start: 2022-04-19 | End: 2022-06-30

## 2022-04-19 NOTE — PATIENT INSTRUCTIONS
Sami was seen today for results.    Diagnoses and all orders for this visit:    Shortness of breath  -     Pulmonary function test; Future  -     Ambulatory referral/consult to Pulmonology; Future    Other dysphagia  -     Ambulatory referral/consult to ENT; Future  -     Cancel: SLP eval of swallow & oral function; Future  -     Ambulatory referral/consult to Speech Therapy; Future    RAULITO (obstructive sleep apnea)  -     Ambulatory referral/consult to Sleep Disorders; Future    Hypothyroidism, unspecified type  -     TSH; Future  -     TSH    Other orders  The following orders have not been finalized:  -     propranoloL (INDERAL) 20 MG tablet

## 2022-04-19 NOTE — PROGRESS NOTES
Subjective:       Patient ID: Sami Kerns is a 72 y.o. male.    Chief Complaint: Results    Patient is here today for follow up evaluation. Patient recent labs reviewed and shows thyroid is overactive. Patient has not yet has US of thyroid done yet. Will resend order STAT. Will repeat TSH level in 1 month. Will prescribe Propanolol 20 mg PO BID. Patient states only has trouble swallowing when taking his medications. Has appointment with GI tomorrow. Will follow in 6 weeks.       Current Medications:    Current Outpatient Medications:     amiodarone (PACERONE) 200 MG Tab, Take by mouth once daily., Disp: , Rfl:     ascorbic acid, vitamin C, (VITAMIN C) 1000 MG tablet, Take 1,000 mg by mouth once daily., Disp: , Rfl:     aspirin (ECOTRIN) 81 MG EC tablet, Take 81 mg by mouth once daily., Disp: , Rfl:     atorvastatin (LIPITOR) 80 MG tablet, Take 1 tablet by mouth once daily., Disp: , Rfl:     cilostazoL (PLETAL) 100 MG Tab, Take 100 mg by mouth 2 (two) times daily., Disp: , Rfl:     clopidogreL (PLAVIX) 75 mg tablet, Take 75 mg by mouth once daily., Disp: , Rfl:     ezetimibe (ZETIA) 10 mg tablet, Take 10 mg by mouth once daily., Disp: , Rfl:     furosemide (LASIX) 40 MG tablet, Take 40 mg by mouth once daily., Disp: , Rfl:     hydrALAZINE (APRESOLINE) 50 MG tablet, Take 50 mg by mouth 3 (three) times daily., Disp: , Rfl:     losartan (COZAAR) 100 MG tablet, Take 100 mg by mouth once daily., Disp: , Rfl:     metoprolol succinate (TOPROL-XL) 50 MG 24 hr tablet, Take 1 tablet by mouth once daily., Disp: , Rfl:     naproxen (NAPROSYN) 500 MG tablet, Take 1 tablet (500 mg total) by mouth 2 (two) times daily as needed (pain)., Disp: 20 tablet, Rfl: 1    tamsulosin (FLOMAX) 0.4 mg Cap, Take 1 capsule (0.4 mg total) by mouth once daily., Disp: 90 capsule, Rfl: 3    Last Labs:     Office Visit on 04/12/2022   Component Date Value    TSH 04/12/2022 0.009 (A)    Total T4 04/12/2022 16.2 (A)   Office Visit  on 03/29/2022   Component Date Value    Hepatitis C Ab 03/29/2022 Non-Reactive     Triglycerides 03/29/2022 124     Cholesterol 03/29/2022 103     HDL Cholesterol 03/29/2022 35 (A)    Cholesterol/HDL Ratio (R* 03/29/2022 2.9     Non-HDL 03/29/2022 68     LDL Calculated 03/29/2022 43     LDL/HDL 03/29/2022 1.2     VLDL 03/29/2022 25        Last Imaging:  X-Ray Chest PA And Lateral  Narrative: EXAMINATION:  XR CHEST PA AND LATERAL    CLINICAL HISTORY:  Dyspnea, unspecified    COMPARISON:  8 September 2021    FINDINGS:  The heart and mediastinum are normal in size and configuration.  The pulmonary vascularity is normal in caliber.  No lung infiltrates, effusions, pneumothorax or other abnormality is demonstrated.  Impression: No evidence of acute cardiopulmonary disease.    Electronically signed by: Lg Ford  Date:    04/12/2022  Time:    15:00         Review of Systems   HENT: Positive for trouble swallowing.    All other systems reviewed and are negative.        Objective:      Physical Exam  Vitals reviewed.   Constitutional:       Appearance: Normal appearance.   Cardiovascular:      Rate and Rhythm: Normal rate and regular rhythm.      Pulses: Normal pulses.      Heart sounds: Normal heart sounds.   Pulmonary:      Effort: Pulmonary effort is normal.      Breath sounds: Normal breath sounds.   Abdominal:      General: Abdomen is flat. Bowel sounds are normal.      Palpations: Abdomen is soft.   Musculoskeletal:         General: Normal range of motion.      Cervical back: Normal range of motion and neck supple.   Skin:     General: Skin is warm and dry.   Neurological:      General: No focal deficit present.      Mental Status: He is alert and oriented to person, place, and time. Mental status is at baseline.         Assessment:       1. Shortness of breath  Pulmonary function test    Ambulatory referral/consult to Pulmonology   2. Other dysphagia  Ambulatory referral/consult to ENT    Ambulatory  referral/consult to Speech Therapy    CANCELED: SLP eval of swallow & oral function   3. RAULITO (obstructive sleep apnea)  Ambulatory referral/consult to Sleep Disorders   4. Hypothyroidism, unspecified type  TSH    TSH        Plan:         Sami was seen today for results.    Diagnoses and all orders for this visit:    Shortness of breath  -     Pulmonary function test; Future  -     Ambulatory referral/consult to Pulmonology; Future    Other dysphagia  -     Ambulatory referral/consult to ENT; Future  -     Cancel: SLP eval of swallow & oral function; Future  -     Ambulatory referral/consult to Speech Therapy; Future    RAULITO (obstructive sleep apnea)  -     Ambulatory referral/consult to Sleep Disorders; Future    Hypothyroidism, unspecified type  -     TSH; Future  -     TSH    Other orders  The following orders have not been finalized:  -     propranoloL (INDERAL) 20 MG tablet

## 2022-04-20 ENCOUNTER — OFFICE VISIT (OUTPATIENT)
Dept: GASTROENTEROLOGY | Facility: CLINIC | Age: 72
End: 2022-04-20
Payer: MEDICARE

## 2022-04-20 VITALS
HEART RATE: 75 BPM | SYSTOLIC BLOOD PRESSURE: 103 MMHG | HEIGHT: 71 IN | DIASTOLIC BLOOD PRESSURE: 50 MMHG | WEIGHT: 190 LBS | BODY MASS INDEX: 26.6 KG/M2 | OXYGEN SATURATION: 98 %

## 2022-04-20 DIAGNOSIS — D64.9 ANEMIA, UNSPECIFIED TYPE: Primary | ICD-10-CM

## 2022-04-20 DIAGNOSIS — R13.10 DYSPHAGIA, UNSPECIFIED TYPE: ICD-10-CM

## 2022-04-20 PROCEDURE — 3078F PR MOST RECENT DIASTOLIC BLOOD PRESSURE < 80 MM HG: ICD-10-PCS | Mod: CPTII,,, | Performed by: NURSE PRACTITIONER

## 2022-04-20 PROCEDURE — 99214 OFFICE O/P EST MOD 30 MIN: CPT | Mod: ,,, | Performed by: NURSE PRACTITIONER

## 2022-04-20 PROCEDURE — 1101F PT FALLS ASSESS-DOCD LE1/YR: CPT | Mod: CPTII,,, | Performed by: NURSE PRACTITIONER

## 2022-04-20 PROCEDURE — 1159F PR MEDICATION LIST DOCUMENTED IN MEDICAL RECORD: ICD-10-PCS | Mod: CPTII,,, | Performed by: NURSE PRACTITIONER

## 2022-04-20 PROCEDURE — 99214 PR OFFICE/OUTPT VISIT, EST, LEVL IV, 30-39 MIN: ICD-10-PCS | Mod: ,,, | Performed by: NURSE PRACTITIONER

## 2022-04-20 PROCEDURE — 3074F PR MOST RECENT SYSTOLIC BLOOD PRESSURE < 130 MM HG: ICD-10-PCS | Mod: CPTII,,, | Performed by: NURSE PRACTITIONER

## 2022-04-20 PROCEDURE — 3008F BODY MASS INDEX DOCD: CPT | Mod: CPTII,,, | Performed by: NURSE PRACTITIONER

## 2022-04-20 PROCEDURE — 3288F PR FALLS RISK ASSESSMENT DOCUMENTED: ICD-10-PCS | Mod: CPTII,,, | Performed by: NURSE PRACTITIONER

## 2022-04-20 PROCEDURE — 3074F SYST BP LT 130 MM HG: CPT | Mod: CPTII,,, | Performed by: NURSE PRACTITIONER

## 2022-04-20 PROCEDURE — 1159F MED LIST DOCD IN RCRD: CPT | Mod: CPTII,,, | Performed by: NURSE PRACTITIONER

## 2022-04-20 PROCEDURE — 1160F PR REVIEW ALL MEDS BY PRESCRIBER/CLIN PHARMACIST DOCUMENTED: ICD-10-PCS | Mod: CPTII,,, | Performed by: NURSE PRACTITIONER

## 2022-04-20 PROCEDURE — 3078F DIAST BP <80 MM HG: CPT | Mod: CPTII,,, | Performed by: NURSE PRACTITIONER

## 2022-04-20 PROCEDURE — 1160F RVW MEDS BY RX/DR IN RCRD: CPT | Mod: CPTII,,, | Performed by: NURSE PRACTITIONER

## 2022-04-20 PROCEDURE — 1101F PR PT FALLS ASSESS DOC 0-1 FALLS W/OUT INJ PAST YR: ICD-10-PCS | Mod: CPTII,,, | Performed by: NURSE PRACTITIONER

## 2022-04-20 PROCEDURE — 3288F FALL RISK ASSESSMENT DOCD: CPT | Mod: CPTII,,, | Performed by: NURSE PRACTITIONER

## 2022-04-20 PROCEDURE — 3008F PR BODY MASS INDEX (BMI) DOCUMENTED: ICD-10-PCS | Mod: CPTII,,, | Performed by: NURSE PRACTITIONER

## 2022-04-20 NOTE — PROGRESS NOTES
Sami Kerns is a 72 y.o. male here for Anemia        PCP: Michael Weems  Referring Provider: Michael Weems Md  [address] [place] [address]     HPI:  Presents for anemia in referral from Dr. Weems. Mildly anemic. Hgb 12.2 and Hct 35.4. He denies melena and hematochezia. Denies abdominal pain. No weight loss. Endorses dysphagia with solid foods. No changes in appetite. Last colonoscopy 11/29/21, with recommendation to repeat in 3 years.         ROS:  Review of Systems   Constitutional: Negative for activity change, appetite change, fatigue, fever and unexpected weight change.   HENT: Positive for trouble swallowing. Negative for sore throat.    Respiratory: Negative for cough and shortness of breath.    Cardiovascular: Negative for chest pain.   Gastrointestinal: Negative for abdominal distention, abdominal pain, blood in stool, change in bowel habit, constipation, diarrhea, nausea, vomiting, reflux and change in bowel habit.   Musculoskeletal: Negative for arthralgias and gait problem.   Integumentary:  Negative for color change.   Neurological: Negative for dizziness and light-headedness.   Hematological: Does not bruise/bleed easily.   Psychiatric/Behavioral: Negative for sleep disturbance. The patient is not nervous/anxious.           PMHX:  has a past medical history of Adenomatous polyp of ascending colon (11/29/2021), Adenomatous polyp of descending colon (11/29/2021), Adenomatous polyp of transverse colon (11/29/2021), Anticoagulant long-term use, Blood in stool (11/29/2021), CAD (coronary artery disease), CHF (congestive heart failure), COPD (chronic obstructive pulmonary disease), GERD (gastroesophageal reflux disease), Gout, HTN (hypertension), Hyperlipidemia, Iron deficiency anemia due to chronic blood loss (11/29/2021), Orchitis (1/20/2022), Osteoarthritis, Polyp of splenic flexure of colon (11/29/2021), Screening for colon cancer (11/29/2021), and Urinary incontinence  "(1/20/2022).    PSHX:  has a past surgical history that includes Coronary Artery Bypass Graft (CABG) and Tonsillectomy.    PFHX: family history includes Hypertension in his mother.    PSlHX:  reports that he has been smoking cigarettes. He has a 55.00 pack-year smoking history. He has never used smokeless tobacco. He reports that he does not drink alcohol and does not use drugs.        Review of patient's allergies indicates:  No Known Allergies    Medication List with Changes/Refills   Current Medications    AMIODARONE (PACERONE) 200 MG TAB    Take by mouth once daily.    ASCORBIC ACID, VITAMIN C, (VITAMIN C) 1000 MG TABLET    Take 1,000 mg by mouth once daily.    ASPIRIN (ECOTRIN) 81 MG EC TABLET    Take 81 mg by mouth once daily.    ATORVASTATIN (LIPITOR) 80 MG TABLET    Take 1 tablet by mouth once daily.    CILOSTAZOL (PLETAL) 100 MG TAB    Take 100 mg by mouth 2 (two) times daily.    CLOPIDOGREL (PLAVIX) 75 MG TABLET    Take 75 mg by mouth once daily.    EZETIMIBE (ZETIA) 10 MG TABLET    Take 10 mg by mouth once daily.    FUROSEMIDE (LASIX) 40 MG TABLET    Take 40 mg by mouth once daily.    HYDRALAZINE (APRESOLINE) 50 MG TABLET    Take 50 mg by mouth 3 (three) times daily.    LOSARTAN (COZAAR) 100 MG TABLET    Take 100 mg by mouth once daily.    METOPROLOL SUCCINATE (TOPROL-XL) 50 MG 24 HR TABLET    Take 1 tablet by mouth once daily.    NAPROXEN (NAPROSYN) 500 MG TABLET    Take 1 tablet (500 mg total) by mouth 2 (two) times daily as needed (pain).    PROPRANOLOL (INDERAL) 20 MG TABLET    Take 1 tablet (20 mg total) by mouth 2 (two) times daily.    TAMSULOSIN (FLOMAX) 0.4 MG CAP    Take 1 capsule (0.4 mg total) by mouth once daily.        Objective Findings:  Vital Signs:  BP (!) 103/50   Pulse 75   Ht 5' 11" (1.803 m)   Wt 86.2 kg (190 lb)   SpO2 98%   BMI 26.50 kg/m²  Body mass index is 26.5 kg/m².    Physical Exam:  Physical Exam  Vitals and nursing note reviewed.   Constitutional:       General: He is " not in acute distress.     Appearance: Normal appearance. He is not ill-appearing.   HENT:      Mouth/Throat:      Mouth: Mucous membranes are moist.   Cardiovascular:      Rate and Rhythm: Normal rate and regular rhythm.   Pulmonary:      Breath sounds: No wheezing or rhonchi.   Abdominal:      General: Bowel sounds are normal. There is no distension.      Palpations: Abdomen is soft. There is no mass.      Tenderness: There is no abdominal tenderness. There is no guarding or rebound.      Hernia: No hernia is present.   Musculoskeletal:      Right lower leg: No edema.      Left lower leg: No edema.   Skin:     General: Skin is warm and dry.      Coloration: Skin is not jaundiced or pale.      Findings: No bruising.   Neurological:      Mental Status: He is alert and oriented to person, place, and time.   Psychiatric:         Mood and Affect: Mood normal.          Labs:  Lab Results   Component Value Date    WBC 6.51 02/01/2022    HGB 11.5 (L) 02/01/2022    HCT 34.5 (L) 02/01/2022    MCV 76.0 (L) 02/01/2022    RDW 15.8 (H) 02/01/2022     02/01/2022    LYMPH 21.2 (L) 02/01/2022    LYMPH 1.38 02/01/2022    MONO 8.8 (H) 02/01/2022    EOS 0.06 02/01/2022    BASO 0.03 02/01/2022     Lab Results   Component Value Date     09/08/2021    K 3.6 09/08/2021     09/08/2021    CO2 27 09/08/2021     (H) 09/08/2021    BUN 20 (H) 12/08/2021    CREATININE 1.70 (H) 12/08/2021    CALCIUM 9.6 09/08/2021         Imaging: X-Ray Chest PA And Lateral    Result Date: 4/12/2022  EXAMINATION: XR CHEST PA AND LATERAL CLINICAL HISTORY: Dyspnea, unspecified COMPARISON: 8 September 2021 FINDINGS: The heart and mediastinum are normal in size and configuration.  The pulmonary vascularity is normal in caliber.  No lung infiltrates, effusions, pneumothorax or other abnormality is demonstrated.     No evidence of acute cardiopulmonary disease. Electronically signed by: Lg Ford Date:    04/12/2022  Time:    15:00        Assessment:  Sami Kerns is a 72 y.o. male here with:  1. Anemia, unspecified type    2. Dysphagia, unspecified type          Recommendations:  1. CBC, Iron studies, SPEP, CMP today  2. Schedule EGD/Dilation for dysphagia and anemia  3. Patient will need approval from Dr. Melchor to stop anticoagulants  4. Avoid spicy, greasy foods  5. Avoid NSAID's        Follow up in about 3 months (around 7/20/2022).      Order summary:  Orders Placed This Encounter    CBC Auto Differential    Iron and TIBC    Ferritin    Comprehensive Metabolic Panel    Protein Electrophoresis, Serum with Reflex BECKY    EGD w Dilation       Thank you for allowing me to participate in the care of Sami Kerns.      Pauline Schaeffer, SARA-C

## 2022-04-21 ENCOUNTER — CLINICAL SUPPORT (OUTPATIENT)
Dept: PULMONOLOGY | Facility: HOSPITAL | Age: 72
End: 2022-04-21
Attending: INTERNAL MEDICINE
Payer: MEDICARE

## 2022-04-21 ENCOUNTER — HOSPITAL ENCOUNTER (OUTPATIENT)
Dept: RADIOLOGY | Facility: HOSPITAL | Age: 72
Discharge: HOME | End: 2022-04-21
Attending: INTERNAL MEDICINE
Payer: MEDICARE

## 2022-04-21 DIAGNOSIS — R06.02 SHORTNESS OF BREATH: ICD-10-CM

## 2022-04-21 DIAGNOSIS — E01.0 THYROMEGALY: ICD-10-CM

## 2022-04-21 DIAGNOSIS — D47.2 MONOCLONAL GAMMOPATHY: Primary | ICD-10-CM

## 2022-04-21 PROCEDURE — 94060 EVALUATION OF WHEEZING: CPT

## 2022-04-21 PROCEDURE — 76536 US EXAM OF HEAD AND NECK: CPT | Mod: TC

## 2022-04-21 PROCEDURE — 94729 PR C02/MEMBANE DIFFUSE CAPACITY: ICD-10-PCS | Mod: 26,,, | Performed by: INTERNAL MEDICINE

## 2022-04-21 PROCEDURE — 94726 PLETHYSMOGRAPHY LUNG VOLUMES: CPT | Mod: 26,,, | Performed by: INTERNAL MEDICINE

## 2022-04-21 PROCEDURE — 94726 PULM FUNCT TST PLETHYSMOGRAP: ICD-10-PCS | Mod: 26,,, | Performed by: INTERNAL MEDICINE

## 2022-04-21 PROCEDURE — 94726 PLETHYSMOGRAPHY LUNG VOLUMES: CPT

## 2022-04-21 PROCEDURE — 76536 US EXAM OF HEAD AND NECK: CPT | Mod: 26,,, | Performed by: RADIOLOGY

## 2022-04-21 PROCEDURE — 94060 EVALUATION OF WHEEZING: CPT | Mod: 26,,, | Performed by: INTERNAL MEDICINE

## 2022-04-21 PROCEDURE — 94060 PR EVAL OF BRONCHOSPASM: ICD-10-PCS | Mod: 26,,, | Performed by: INTERNAL MEDICINE

## 2022-04-21 PROCEDURE — 76536 US SOFT TISSUE HEAD NECK THYROID: ICD-10-PCS | Mod: 26,,, | Performed by: RADIOLOGY

## 2022-04-21 PROCEDURE — 94729 DIFFUSING CAPACITY: CPT | Mod: 26,,, | Performed by: INTERNAL MEDICINE

## 2022-04-21 PROCEDURE — 94729 DIFFUSING CAPACITY: CPT

## 2022-04-25 ENCOUNTER — TELEPHONE (OUTPATIENT)
Dept: GASTROENTEROLOGY | Facility: CLINIC | Age: 72
End: 2022-04-25
Payer: MEDICARE

## 2022-04-26 ENCOUNTER — OFFICE VISIT (OUTPATIENT)
Dept: FAMILY MEDICINE | Facility: CLINIC | Age: 72
End: 2022-04-26
Payer: MEDICARE

## 2022-04-26 VITALS
OXYGEN SATURATION: 98 % | SYSTOLIC BLOOD PRESSURE: 120 MMHG | BODY MASS INDEX: 27.16 KG/M2 | TEMPERATURE: 98 F | WEIGHT: 194 LBS | RESPIRATION RATE: 20 BRPM | DIASTOLIC BLOOD PRESSURE: 62 MMHG | HEIGHT: 71 IN | HEART RATE: 55 BPM

## 2022-04-26 DIAGNOSIS — Z72.0 TOBACCO USER: Primary | ICD-10-CM

## 2022-04-26 DIAGNOSIS — R13.10 DYSPHAGIA, UNSPECIFIED TYPE: ICD-10-CM

## 2022-04-26 DIAGNOSIS — R49.0 HOARSENESS: ICD-10-CM

## 2022-04-26 DIAGNOSIS — Z87.891 PERSONAL HISTORY OF NICOTINE DEPENDENCE: ICD-10-CM

## 2022-04-26 PROCEDURE — 3288F FALL RISK ASSESSMENT DOCD: CPT | Mod: ,,, | Performed by: INTERNAL MEDICINE

## 2022-04-26 PROCEDURE — 1126F PR PAIN SEVERITY QUANTIFIED, NO PAIN PRESENT: ICD-10-PCS | Mod: ,,, | Performed by: INTERNAL MEDICINE

## 2022-04-26 PROCEDURE — 1101F PR PT FALLS ASSESS DOC 0-1 FALLS W/OUT INJ PAST YR: ICD-10-PCS | Mod: ,,, | Performed by: INTERNAL MEDICINE

## 2022-04-26 PROCEDURE — 99214 OFFICE O/P EST MOD 30 MIN: CPT | Mod: ,,, | Performed by: INTERNAL MEDICINE

## 2022-04-26 PROCEDURE — 3078F DIAST BP <80 MM HG: CPT | Mod: ,,, | Performed by: INTERNAL MEDICINE

## 2022-04-26 PROCEDURE — 99214 PR OFFICE/OUTPT VISIT, EST, LEVL IV, 30-39 MIN: ICD-10-PCS | Mod: ,,, | Performed by: INTERNAL MEDICINE

## 2022-04-26 PROCEDURE — 3288F PR FALLS RISK ASSESSMENT DOCUMENTED: ICD-10-PCS | Mod: ,,, | Performed by: INTERNAL MEDICINE

## 2022-04-26 PROCEDURE — 1159F MED LIST DOCD IN RCRD: CPT | Mod: ,,, | Performed by: INTERNAL MEDICINE

## 2022-04-26 PROCEDURE — 1159F PR MEDICATION LIST DOCUMENTED IN MEDICAL RECORD: ICD-10-PCS | Mod: ,,, | Performed by: INTERNAL MEDICINE

## 2022-04-26 PROCEDURE — 3074F SYST BP LT 130 MM HG: CPT | Mod: ,,, | Performed by: INTERNAL MEDICINE

## 2022-04-26 PROCEDURE — 3074F PR MOST RECENT SYSTOLIC BLOOD PRESSURE < 130 MM HG: ICD-10-PCS | Mod: ,,, | Performed by: INTERNAL MEDICINE

## 2022-04-26 PROCEDURE — 3008F BODY MASS INDEX DOCD: CPT | Mod: ,,, | Performed by: INTERNAL MEDICINE

## 2022-04-26 PROCEDURE — 3078F PR MOST RECENT DIASTOLIC BLOOD PRESSURE < 80 MM HG: ICD-10-PCS | Mod: ,,, | Performed by: INTERNAL MEDICINE

## 2022-04-26 PROCEDURE — 1101F PT FALLS ASSESS-DOCD LE1/YR: CPT | Mod: ,,, | Performed by: INTERNAL MEDICINE

## 2022-04-26 PROCEDURE — 1126F AMNT PAIN NOTED NONE PRSNT: CPT | Mod: ,,, | Performed by: INTERNAL MEDICINE

## 2022-04-26 PROCEDURE — 1160F RVW MEDS BY RX/DR IN RCRD: CPT | Mod: ,,, | Performed by: INTERNAL MEDICINE

## 2022-04-26 PROCEDURE — 3008F PR BODY MASS INDEX (BMI) DOCUMENTED: ICD-10-PCS | Mod: ,,, | Performed by: INTERNAL MEDICINE

## 2022-04-26 PROCEDURE — 1160F PR REVIEW ALL MEDS BY PRESCRIBER/CLIN PHARMACIST DOCUMENTED: ICD-10-PCS | Mod: ,,, | Performed by: INTERNAL MEDICINE

## 2022-04-26 NOTE — PATIENT INSTRUCTIONS
Sami was seen today for shortness of breath, dysphagia and sleep apnea.    Diagnoses and all orders for this visit:    Tobacco user  -     CT Chest Lung Screening Low Dose; Future  -     US AAA Screening; Future    Dysphagia, unspecified type  -     CT Chest Lung Screening Low Dose; Future  -     US AAA Screening; Future    Hoarseness  -     CT Chest Lung Screening Low Dose; Future  -     US AAA Screening; Future    Personal history of nicotine dependence   -     CT Chest Lung Screening Low Dose; Future

## 2022-04-26 NOTE — PROGRESS NOTES
Subjective:       Patient ID: Sami Kerns is a 72 y.o. male.    Chief Complaint: Shortness of Breath, Dysphagia, and Sleep Apnea    Patient is here today for a follow up evaluation. Recent labs reviewed, results normal. Recent US and X-ray reviewed, results show no abnormalities. Patient states he still has trouble swallowing. Patient also states he has been experiencing SOB. Patient does follow with  and . Will order CT Chest Low Dose and US Abdominal Aorta for screening. Patient has a history of tobacco use for 35 years. Will follow in 6 weeks.       Current Medications:    Current Outpatient Medications:     amiodarone (PACERONE) 200 MG Tab, Take by mouth once daily., Disp: , Rfl:     ascorbic acid, vitamin C, (VITAMIN C) 1000 MG tablet, Take 1,000 mg by mouth once daily., Disp: , Rfl:     aspirin (ECOTRIN) 81 MG EC tablet, Take 81 mg by mouth once daily., Disp: , Rfl:     atorvastatin (LIPITOR) 80 MG tablet, Take 1 tablet by mouth once daily., Disp: , Rfl:     cilostazoL (PLETAL) 100 MG Tab, Take 100 mg by mouth 2 (two) times daily., Disp: , Rfl:     clopidogreL (PLAVIX) 75 mg tablet, Take 75 mg by mouth once daily., Disp: , Rfl:     ezetimibe (ZETIA) 10 mg tablet, Take 10 mg by mouth once daily., Disp: , Rfl:     furosemide (LASIX) 40 MG tablet, Take 40 mg by mouth once daily., Disp: , Rfl:     hydrALAZINE (APRESOLINE) 50 MG tablet, Take 50 mg by mouth 3 (three) times daily., Disp: , Rfl:     losartan (COZAAR) 100 MG tablet, Take 100 mg by mouth once daily., Disp: , Rfl:     metoprolol succinate (TOPROL-XL) 50 MG 24 hr tablet, Take 1 tablet by mouth once daily., Disp: , Rfl:     propranoloL (INDERAL) 20 MG tablet, Take 1 tablet (20 mg total) by mouth 2 (two) times daily., Disp: 60 tablet, Rfl: 1    tamsulosin (FLOMAX) 0.4 mg Cap, Take 1 capsule (0.4 mg total) by mouth once daily., Disp: 90 capsule, Rfl: 3    naproxen (NAPROSYN) 500 MG tablet, Take 1 tablet (500 mg total) by  mouth 2 (two) times daily as needed (pain). (Patient not taking: No sig reported), Disp: 20 tablet, Rfl: 1    Last Labs:     Lab Visit on 04/20/2022   Component Date Value    Iron 04/20/2022 49 (A)    Iron Saturation 04/20/2022 17     TIBC 04/20/2022 288     Ferritin 04/20/2022 36     Sodium 04/20/2022 139     Potassium 04/20/2022 4.2     Chloride 04/20/2022 106     CO2 04/20/2022 22     Anion Gap 04/20/2022 15     Glucose 04/20/2022 122 (A)    BUN 04/20/2022 25 (A)    Creatinine 04/20/2022 2.16 (A)    BUN/Creatinine Ratio 04/20/2022 12     Calcium 04/20/2022 9.2     Total Protein 04/20/2022 8.4 (A)    Albumin 04/20/2022 3.6     Globulin 04/20/2022 4.8 (A)    A/G Ratio 04/20/2022 0.8     Bilirubin, Total 04/20/2022 0.4     Alk Phos 04/20/2022 109     ALT 04/20/2022 43     AST 04/20/2022 27     eGFR 04/20/2022 32 (A)    Total Protein 04/20/2022 8.1     Albumin, PE 04/20/2022 4.58     Alpha-1-Globulin 04/20/2022 0.2     Alpha-2-Globulin 04/20/2022 1.2     Beta, PE 04/20/2022 0.7     Gamma, PE 04/20/2022 1.4     Pathologist Interp, Pro * 04/20/2022 See Comment     WBC 04/20/2022 5.23     RBC 04/20/2022 4.65     Hemoglobin 04/20/2022 11.4 (A)    Hematocrit 04/20/2022 34.9 (A)    MCV 04/20/2022 75.1 (A)    MCH 04/20/2022 24.5 (A)    MCHC 04/20/2022 32.7     RDW 04/20/2022 17.7 (A)    Platelet Count 04/20/2022 344     MPV 04/20/2022 10.3     Neutrophils % 04/20/2022 52.5 (A)    Lymphocytes % 04/20/2022 33.3     Monocytes % 04/20/2022 10.3 (A)    Eosinophils % 04/20/2022 3.1     Basophils % 04/20/2022 0.6     Immature Granulocytes % 04/20/2022 0.2     nRBC, Auto 04/20/2022 0.0     Neutrophils, Abs 04/20/2022 2.75     Lymphocytes, Absolute 04/20/2022 1.74     Monocytes, Absolute 04/20/2022 0.54     Eosinophils, Absolute 04/20/2022 0.16     Basophils, Absolute 04/20/2022 0.03     Immature Granulocytes, A* 04/20/2022 0.01     nRBC, Absolute 04/20/2022 0.00     Diff Type  04/20/2022 Auto     IgG, BECKY 04/20/2022 See Comment     IgG 04/20/2022 1,590     IgA, BECKY 04/20/2022 Normal     IgA 04/20/2022 324     IgM, BECYK 04/20/2022 Normal     IgM 04/20/2022 190     Kappa BECKY, Blood 04/20/2022 See Comment     Kappa 04/20/2022 398.00 (A)    Lambda BECKY, Blood 04/20/2022 See Comment     Lambda 04/20/2022 193     Kappa/Lambda Ratio 04/20/2022 2.06     Pathologist Interp, BECKY,* 04/20/2022 See Comment    Office Visit on 04/12/2022   Component Date Value    TSH 04/12/2022 0.009 (A)    Total T4 04/12/2022 16.2 (A)   Office Visit on 03/29/2022   Component Date Value    Hepatitis C Ab 03/29/2022 Non-Reactive     Triglycerides 03/29/2022 124     Cholesterol 03/29/2022 103     HDL Cholesterol 03/29/2022 35 (A)    Cholesterol/HDL Ratio (R* 03/29/2022 2.9     Non-HDL 03/29/2022 68     LDL Calculated 03/29/2022 43     LDL/HDL 03/29/2022 1.2     VLDL 03/29/2022 25        Last Imaging:  US Soft Tissue Head Neck Thyroid  Narrative: EXAMINATION:  US SOFT TISSUE HEAD NECK THYROID    CLINICAL HISTORY:  Iodine-deficiency related diffuse (endemic) goiter    TECHNIQUE:  Gray scale and color Doppler imaging of the thyroid performed    COMPARISON:  None.    FINDINGS:  Thyroid gland is normal in size and echogenicity. The right thyroid lobe length is 4.6 cm. The left thyroid lobe length is 4.2 cm. No nodule or mass seen. There is normal Doppler waveform pattern.  Impression: No evidence of thyroid abnormality.    Ultrasound images stored and captured.    Electronically signed by: Lg Ford  Date:    04/21/2022  Time:    13:11         Review of Systems   HENT: Positive for trouble swallowing.    Respiratory: Positive for shortness of breath.    All other systems reviewed and are negative.        Objective:      Physical Exam  Constitutional:       Appearance: Normal appearance. He is normal weight.   Cardiovascular:      Rate and Rhythm: Normal rate and regular rhythm.      Pulses: Normal  pulses.      Heart sounds: Normal heart sounds.   Pulmonary:      Effort: Pulmonary effort is normal.      Breath sounds: Normal breath sounds.   Abdominal:      General: Abdomen is flat. Bowel sounds are normal.      Palpations: Abdomen is soft.   Musculoskeletal:         General: Normal range of motion.      Cervical back: Normal range of motion and neck supple.   Skin:     General: Skin is warm and dry.   Neurological:      General: No focal deficit present.      Mental Status: He is alert and oriented to person, place, and time. Mental status is at baseline.         Assessment:       1. Tobacco user  CT Chest Lung Screening Low Dose    US AAA Screening   2. Dysphagia, unspecified type  CT Chest Lung Screening Low Dose    US AAA Screening   3. Hoarseness  CT Chest Lung Screening Low Dose    US AAA Screening   4. Personal history of nicotine dependence   CT Chest Lung Screening Low Dose        Plan:         Sami was seen today for shortness of breath, dysphagia and sleep apnea.    Diagnoses and all orders for this visit:    Tobacco user  -     CT Chest Lung Screening Low Dose; Future  -     US AAA Screening; Future    Dysphagia, unspecified type  -     CT Chest Lung Screening Low Dose; Future  -     US AAA Screening; Future    Hoarseness  -     CT Chest Lung Screening Low Dose; Future  -     US AAA Screening; Future    Personal history of nicotine dependence   -     CT Chest Lung Screening Low Dose; Future

## 2022-04-26 NOTE — PROCEDURES
PFTs  Forced vital capacity 3.2 L 76% predicted  FEV1 2.22 L 70% predicted  FEV1 ratio 69%  Hyperinflation  Normal DLCO  Moderate obstructive ventilatory impairment

## 2022-04-27 ENCOUNTER — OFFICE VISIT (OUTPATIENT)
Dept: PULMONOLOGY | Facility: CLINIC | Age: 72
End: 2022-04-27
Payer: MEDICARE

## 2022-04-27 ENCOUNTER — TELEPHONE (OUTPATIENT)
Dept: GASTROENTEROLOGY | Facility: CLINIC | Age: 72
End: 2022-04-27
Payer: MEDICARE

## 2022-04-27 ENCOUNTER — OFFICE VISIT (OUTPATIENT)
Dept: OTOLARYNGOLOGY | Facility: CLINIC | Age: 72
End: 2022-04-27
Payer: MEDICARE

## 2022-04-27 VITALS
HEIGHT: 71 IN | OXYGEN SATURATION: 96 % | RESPIRATION RATE: 18 BRPM | SYSTOLIC BLOOD PRESSURE: 120 MMHG | BODY MASS INDEX: 27.16 KG/M2 | DIASTOLIC BLOOD PRESSURE: 60 MMHG | HEART RATE: 59 BPM | WEIGHT: 194 LBS

## 2022-04-27 VITALS — HEIGHT: 71 IN | BODY MASS INDEX: 27.16 KG/M2 | WEIGHT: 194 LBS

## 2022-04-27 DIAGNOSIS — J44.9 CHRONIC OBSTRUCTIVE PULMONARY DISEASE, UNSPECIFIED COPD TYPE: ICD-10-CM

## 2022-04-27 DIAGNOSIS — J38.1 VOCAL CORD POLYPS: Primary | ICD-10-CM

## 2022-04-27 DIAGNOSIS — R06.02 SHORTNESS OF BREATH: ICD-10-CM

## 2022-04-27 DIAGNOSIS — R13.19 OTHER DYSPHAGIA: ICD-10-CM

## 2022-04-27 PROCEDURE — 99214 PR OFFICE/OUTPT VISIT, EST, LEVL IV, 30-39 MIN: ICD-10-PCS | Mod: S$PBB,25,, | Performed by: OTOLARYNGOLOGY

## 2022-04-27 PROCEDURE — 1126F AMNT PAIN NOTED NONE PRSNT: CPT | Mod: CPTII,,, | Performed by: INTERNAL MEDICINE

## 2022-04-27 PROCEDURE — 1160F PR REVIEW ALL MEDS BY PRESCRIBER/CLIN PHARMACIST DOCUMENTED: ICD-10-PCS | Mod: CPTII,,, | Performed by: OTOLARYNGOLOGY

## 2022-04-27 PROCEDURE — 99203 PR OFFICE/OUTPT VISIT, NEW, LEVL III, 30-44 MIN: ICD-10-PCS | Mod: S$PBB,,, | Performed by: INTERNAL MEDICINE

## 2022-04-27 PROCEDURE — 3074F SYST BP LT 130 MM HG: CPT | Mod: CPTII,,, | Performed by: INTERNAL MEDICINE

## 2022-04-27 PROCEDURE — 1159F PR MEDICATION LIST DOCUMENTED IN MEDICAL RECORD: ICD-10-PCS | Mod: CPTII,,, | Performed by: OTOLARYNGOLOGY

## 2022-04-27 PROCEDURE — 99203 OFFICE O/P NEW LOW 30 MIN: CPT | Mod: S$PBB,,, | Performed by: INTERNAL MEDICINE

## 2022-04-27 PROCEDURE — 3008F BODY MASS INDEX DOCD: CPT | Mod: CPTII,,, | Performed by: INTERNAL MEDICINE

## 2022-04-27 PROCEDURE — 1101F PT FALLS ASSESS-DOCD LE1/YR: CPT | Mod: CPTII,,, | Performed by: INTERNAL MEDICINE

## 2022-04-27 PROCEDURE — 31575 DIAGNOSTIC LARYNGOSCOPY: CPT | Mod: S$PBB,,, | Performed by: OTOLARYNGOLOGY

## 2022-04-27 PROCEDURE — 3008F PR BODY MASS INDEX (BMI) DOCUMENTED: ICD-10-PCS | Mod: CPTII,,, | Performed by: INTERNAL MEDICINE

## 2022-04-27 PROCEDURE — 3288F PR FALLS RISK ASSESSMENT DOCUMENTED: ICD-10-PCS | Mod: CPTII,,, | Performed by: INTERNAL MEDICINE

## 2022-04-27 PROCEDURE — 1101F PR PT FALLS ASSESS DOC 0-1 FALLS W/OUT INJ PAST YR: ICD-10-PCS | Mod: CPTII,,, | Performed by: INTERNAL MEDICINE

## 2022-04-27 PROCEDURE — 99215 OFFICE O/P EST HI 40 MIN: CPT | Mod: PBBFAC,25,27 | Performed by: INTERNAL MEDICINE

## 2022-04-27 PROCEDURE — 3288F FALL RISK ASSESSMENT DOCD: CPT | Mod: CPTII,,, | Performed by: INTERNAL MEDICINE

## 2022-04-27 PROCEDURE — 3078F DIAST BP <80 MM HG: CPT | Mod: CPTII,,, | Performed by: INTERNAL MEDICINE

## 2022-04-27 PROCEDURE — 1160F RVW MEDS BY RX/DR IN RCRD: CPT | Mod: CPTII,,, | Performed by: OTOLARYNGOLOGY

## 2022-04-27 PROCEDURE — 3008F PR BODY MASS INDEX (BMI) DOCUMENTED: ICD-10-PCS | Mod: CPTII,,, | Performed by: OTOLARYNGOLOGY

## 2022-04-27 PROCEDURE — 1159F MED LIST DOCD IN RCRD: CPT | Mod: CPTII,,, | Performed by: OTOLARYNGOLOGY

## 2022-04-27 PROCEDURE — 31575 DIAGNOSTIC LARYNGOSCOPY: CPT | Mod: PBBFAC | Performed by: OTOLARYNGOLOGY

## 2022-04-27 PROCEDURE — 3008F BODY MASS INDEX DOCD: CPT | Mod: CPTII,,, | Performed by: OTOLARYNGOLOGY

## 2022-04-27 PROCEDURE — 3074F PR MOST RECENT SYSTOLIC BLOOD PRESSURE < 130 MM HG: ICD-10-PCS | Mod: CPTII,,, | Performed by: INTERNAL MEDICINE

## 2022-04-27 PROCEDURE — 1126F PR PAIN SEVERITY QUANTIFIED, NO PAIN PRESENT: ICD-10-PCS | Mod: CPTII,,, | Performed by: INTERNAL MEDICINE

## 2022-04-27 PROCEDURE — 31575 PR LARYNGOSCOPY, FLEXIBLE; DIAGNOSTIC: ICD-10-PCS | Mod: S$PBB,,, | Performed by: OTOLARYNGOLOGY

## 2022-04-27 PROCEDURE — 1159F MED LIST DOCD IN RCRD: CPT | Mod: CPTII,,, | Performed by: INTERNAL MEDICINE

## 2022-04-27 PROCEDURE — 99214 OFFICE O/P EST MOD 30 MIN: CPT | Mod: S$PBB,25,, | Performed by: OTOLARYNGOLOGY

## 2022-04-27 PROCEDURE — 1159F PR MEDICATION LIST DOCUMENTED IN MEDICAL RECORD: ICD-10-PCS | Mod: CPTII,,, | Performed by: INTERNAL MEDICINE

## 2022-04-27 PROCEDURE — 3078F PR MOST RECENT DIASTOLIC BLOOD PRESSURE < 80 MM HG: ICD-10-PCS | Mod: CPTII,,, | Performed by: INTERNAL MEDICINE

## 2022-04-27 PROCEDURE — 99215 OFFICE O/P EST HI 40 MIN: CPT | Mod: PBBFAC,25 | Performed by: OTOLARYNGOLOGY

## 2022-04-27 NOTE — PROGRESS NOTES
Subjective:       Patient ID: Sami Kerns is a 72 y.o. male.    Chief Complaint: COPD (Consult: Dr. Weems)    COPD  This is a chronic problem. The current episode started more than 1 month ago. The problem occurs rarely. The problem has been unchanged. Pertinent negatives include no abdominal pain, arthralgias, chest pain, chills, congestion, headaches or rash. Nothing aggravates the symptoms. He has tried nothing for the symptoms. The treatment provided mild relief.     Past Medical History:   Diagnosis Date    Adenomatous polyp of ascending colon 11/29/2021    Adenomatous polyp of descending colon 11/29/2021    Adenomatous polyp of transverse colon 11/29/2021    Anticoagulant long-term use     Blood in stool 11/29/2021    CAD (coronary artery disease)     CHF (congestive heart failure)     COPD (chronic obstructive pulmonary disease)     GERD (gastroesophageal reflux disease)     Gout     HTN (hypertension)     Hyperlipidemia     Iron deficiency anemia due to chronic blood loss 11/29/2021    Orchitis 1/20/2022    Osteoarthritis     Polyp of splenic flexure of colon 11/29/2021    Screening for colon cancer 11/29/2021    Urinary incontinence 1/20/2022     Past Surgical History:   Procedure Laterality Date    CORONARY ARTERY BYPASS GRAFT (CABG)      TONSILLECTOMY       Family History   Problem Relation Age of Onset    Hypertension Mother      Review of patient's allergies indicates:  No Known Allergies   Social History     Tobacco Use    Smoking status: Current Some Day Smoker     Packs/day: 1.00     Years: 55.00     Pack years: 55.00     Types: Cigarettes    Smokeless tobacco: Never Used    Tobacco comment: at [employer] smokes 1 cig/day trying to quit but refuses information   Substance Use Topics    Alcohol use: Never    Drug use: Never      Review of Systems   Constitutional: Negative for chills, activity change and night sweats.   HENT: Negative for congestion and ear pain.    Eyes:  Negative for redness and itching.   Cardiovascular: Negative for chest pain and palpitations.   Musculoskeletal: Negative for arthralgias and back pain.   Skin: Negative for rash.   Gastrointestinal: Negative for abdominal pain and abdominal distention.   Neurological: Negative for dizziness and headaches.   Hematological: Negative for adenopathy. Does not bruise/bleed easily.   Psychiatric/Behavioral: Negative for confusion. The patient is not nervous/anxious.        Objective:      Physical Exam   Constitutional: He is oriented to person, place, and time. He appears well-developed and well-nourished.   HENT:   Head: Normocephalic.   Nose: Nose normal.   Mouth/Throat: Oropharynx is clear and moist.   Neck: No JVD present. No thyromegaly present.   Cardiovascular: Normal rate, regular rhythm, normal heart sounds and intact distal pulses.   Pulmonary/Chest: Normal expansion, hyperinflation, symmetric chest wall expansion, effort normal and breath sounds normal.   Abdominal: Soft. Bowel sounds are normal.   Musculoskeletal:         General: Normal range of motion.      Cervical back: Normal range of motion and neck supple.   Lymphadenopathy: No supraclavicular adenopathy is present.     He has no cervical adenopathy.     He has no axillary adenopathy.   Neurological: He is alert and oriented to person, place, and time. He has normal reflexes.   Skin: Skin is warm and dry.   Psychiatric: He has a normal mood and affect. His behavior is normal.     Personal Diagnostic Review  Pulmonary function showed mild obstructive disease    No flowsheet data found.      Assessment:       1. Shortness of breath    2. Chronic obstructive pulmonary disease, unspecified COPD type        Outpatient Encounter Medications as of 4/27/2022   Medication Sig Dispense Refill    amiodarone (PACERONE) 200 MG Tab Take by mouth once daily.      ascorbic acid, vitamin C, (VITAMIN C) 1000 MG tablet Take 1,000 mg by mouth once daily.      aspirin  (ECOTRIN) 81 MG EC tablet Take 81 mg by mouth once daily.      atorvastatin (LIPITOR) 80 MG tablet Take 1 tablet by mouth once daily.      cilostazoL (PLETAL) 100 MG Tab Take 100 mg by mouth 2 (two) times daily.      clopidogreL (PLAVIX) 75 mg tablet Take 75 mg by mouth once daily.      ezetimibe (ZETIA) 10 mg tablet Take 10 mg by mouth once daily.      furosemide (LASIX) 40 MG tablet Take 40 mg by mouth once daily.      hydrALAZINE (APRESOLINE) 50 MG tablet Take 50 mg by mouth 3 (three) times daily.      losartan (COZAAR) 100 MG tablet Take 100 mg by mouth once daily.      metoprolol succinate (TOPROL-XL) 50 MG 24 hr tablet Take 1 tablet by mouth once daily.      naproxen (NAPROSYN) 500 MG tablet Take 1 tablet (500 mg total) by mouth 2 (two) times daily as needed (pain). (Patient not taking: No sig reported) 20 tablet 1    propranoloL (INDERAL) 20 MG tablet Take 1 tablet (20 mg total) by mouth 2 (two) times daily. 60 tablet 1    tamsulosin (FLOMAX) 0.4 mg Cap Take 1 capsule (0.4 mg total) by mouth once daily. 90 capsule 3     No facility-administered encounter medications on file as of 4/27/2022.     No orders of the defined types were placed in this encounter.      Plan:       Problem List Items Addressed This Visit        Pulmonary    COPD (chronic obstructive pulmonary disease)     Patient is sent here for evaluation for shortness of breath pulmonary function started been done show mild obstructive ventilatory impairment he is only symptomatic when he walks a long ways chest x-ray is unremarkable will re-evaluate in 6 months no need for medicine at this time             Other Visit Diagnoses     Shortness of breath

## 2022-04-27 NOTE — TELEPHONE ENCOUNTER
Attempted to call lab results with no answer or voicemail.      ----- Message from SARA White sent at 4/21/2022  4:20 PM CDT -----  Abnormal BECKY and SPEP, small monoclonal bands. Refer to Hematology for further evaluation

## 2022-04-27 NOTE — ASSESSMENT & PLAN NOTE
Patient is sent here for evaluation for shortness of breath pulmonary function started been done show mild obstructive ventilatory impairment he is only symptomatic when he walks a long ways chest x-ray is unremarkable will re-evaluate in 6 months no need for medicine at this time

## 2022-04-27 NOTE — PROGRESS NOTES
Subjective:       Patient ID: Sami Kerns is a 72 y.o. male.    Chief Complaint: Sore Throat (Patient is having trouble swallowing.)    HPI  Review of Systems   HENT: Positive for sore throat and voice change.    All other systems reviewed and are negative.      Objective:      Physical Exam  General: NAD Hoarse   Head: Normocephalic, atraumatic, no facial asymmetry/normal strength,  Ears: Both auricules normal in appearance, w/o deformities tympanic membranes normal external auditory canals normal  Nose: External nose w/o deformities normal turbinates no drainage or inflammation  Oral Cavity: Lips, gums, floor of mouth, tongue hard palate, and buccal mucosa without mass/lesion  Oropharynx: Mucosa pink and moist, soft palate, posterior pharynx and oropharyngeal wall without mass/lesion  Neck: Supple, symmetric, trachea midline, no palpable mass/lesion, no palpable cervical lymphadenopathy  Skin: Warm and dry, no concerning lesions  Respiratory: Respirations even, unlabored    Nasal/Nasopharyngo/Laryn/Hypopharyngoscopy Procedures    Procedure:  Diagnostic nasal, nasopharyngoscopy, laryngoscopy and hypopharyngoscopy.    Routine preparation with local atomizer with 1% neosynephrine and lidocaine . With customary flexible endoscope.     NOSE:   External:  No gross deformity   Intranasal:    Mucosa:  No polyps, ulcers or lesions.    Septum:  No gross deformity.    Turbinates:  Not enlarged.    Nasopharynx:  No lesions.   Mucosa:  No lesions.   Posterior [place]e:  Patent.   Eustachian Tubes:  Patent.  Larynx/hypopharynx:   Epiglottis:  No lesions, without edema.   AE Folds:  No lesions.   Vocal cords: SEVERE  polyps; nl mobility   Subglottis: No obvious stenosis   Hypopharynx:  No lesions.   Piriform sinus:  No pooling or lesions.   Post Cricoid:  No edema or erythema      Assessment:       1. Vocal cord polyps    2. Other dysphagia        Plan:       DL with excision VC polyp

## 2022-05-03 ENCOUNTER — TELEPHONE (OUTPATIENT)
Dept: GASTROENTEROLOGY | Facility: CLINIC | Age: 72
End: 2022-05-03
Payer: MEDICARE

## 2022-05-03 NOTE — TELEPHONE ENCOUNTER
Attempted to call results and recommendations with no answser or voicemail. Letter sent.      ----- Message from SARA White sent at 4/21/2022  4:20 PM CDT -----  Abnormal BECKY and SPEP, small monoclonal bands. Refer to Hematology for further evaluation

## 2022-05-05 ENCOUNTER — TELEPHONE (OUTPATIENT)
Dept: GASTROENTEROLOGY | Facility: CLINIC | Age: 72
End: 2022-05-05
Payer: MEDICARE

## 2022-05-05 NOTE — TELEPHONE ENCOUNTER
Called lab results and recommendations. Sent referral to Hematology for abnormal BECKY and SPEP. Instructed patient that Dr. Melchor's office sent us a letter stating it was ok for him to stop his blood thinner for his EGD. Patient verbalized good understanding.

## 2022-05-09 ENCOUNTER — TELEPHONE (OUTPATIENT)
Dept: OTOLARYNGOLOGY | Facility: CLINIC | Age: 72
End: 2022-05-09
Payer: MEDICARE

## 2022-05-10 ENCOUNTER — ANESTHESIA EVENT (OUTPATIENT)
Dept: SURGERY | Facility: HOSPITAL | Age: 72
End: 2022-05-10
Payer: MEDICARE

## 2022-05-10 ENCOUNTER — ANESTHESIA (OUTPATIENT)
Dept: SURGERY | Facility: HOSPITAL | Age: 72
End: 2022-05-10
Payer: MEDICARE

## 2022-05-10 ENCOUNTER — HOSPITAL ENCOUNTER (OUTPATIENT)
Facility: HOSPITAL | Age: 72
Discharge: HOME | End: 2022-05-10
Attending: OTOLARYNGOLOGY | Admitting: OTOLARYNGOLOGY
Payer: MEDICARE

## 2022-05-10 VITALS
DIASTOLIC BLOOD PRESSURE: 58 MMHG | RESPIRATION RATE: 16 BRPM | BODY MASS INDEX: 26.6 KG/M2 | WEIGHT: 190 LBS | HEART RATE: 54 BPM | SYSTOLIC BLOOD PRESSURE: 144 MMHG | TEMPERATURE: 98 F | HEIGHT: 71 IN | OXYGEN SATURATION: 96 %

## 2022-05-10 DIAGNOSIS — J38.1 VOCAL CORD POLYPS: ICD-10-CM

## 2022-05-10 DIAGNOSIS — I25.10 CORONARY ARTERY DISEASE: ICD-10-CM

## 2022-05-10 DIAGNOSIS — J38.1 POLYP, VOCAL CORD: ICD-10-CM

## 2022-05-10 DIAGNOSIS — J38.1 VOCAL CORD POLYP: Primary | ICD-10-CM

## 2022-05-10 PROCEDURE — 88305 TISSUE EXAM BY PATHOLOGIST: CPT | Mod: SUR | Performed by: OTOLARYNGOLOGY

## 2022-05-10 PROCEDURE — 71000033 HC RECOVERY, INTIAL HOUR: Performed by: OTOLARYNGOLOGY

## 2022-05-10 PROCEDURE — 27201423 OPTIME MED/SURG SUP & DEVICES STERILE SUPPLY: Performed by: OTOLARYNGOLOGY

## 2022-05-10 PROCEDURE — 27000689 HC BLADE LARYNGOSCOPE ANY SIZE: Performed by: ANESTHESIOLOGY

## 2022-05-10 PROCEDURE — 36000709 HC OR TIME LEV III EA ADD 15 MIN: Performed by: OTOLARYNGOLOGY

## 2022-05-10 PROCEDURE — 88305 TISSUE EXAM BY PATHOLOGIST: CPT | Mod: 26,,, | Performed by: PATHOLOGY

## 2022-05-10 PROCEDURE — D9220A PRA ANESTHESIA: ICD-10-PCS | Mod: ANES,,, | Performed by: ANESTHESIOLOGY

## 2022-05-10 PROCEDURE — D9220A PRA ANESTHESIA: Mod: CRNA,,, | Performed by: NURSE ANESTHETIST, CERTIFIED REGISTERED

## 2022-05-10 PROCEDURE — 88305 SURGICAL PATHOLOGY: ICD-10-PCS | Mod: 26,,, | Performed by: PATHOLOGY

## 2022-05-10 PROCEDURE — 25000003 PHARM REV CODE 250: Performed by: NURSE ANESTHETIST, CERTIFIED REGISTERED

## 2022-05-10 PROCEDURE — 27000165 HC TUBE, ETT CUFFED: Performed by: ANESTHESIOLOGY

## 2022-05-10 PROCEDURE — 37000008 HC ANESTHESIA 1ST 15 MINUTES: Performed by: OTOLARYNGOLOGY

## 2022-05-10 PROCEDURE — 27000260 *HC AIRWAY ORAL: Performed by: ANESTHESIOLOGY

## 2022-05-10 PROCEDURE — 31541 LARYNSCOP W/TUMR EXC + SCOPE: CPT | Mod: ,,, | Performed by: OTOLARYNGOLOGY

## 2022-05-10 PROCEDURE — 31541 PR LARYNGOSCOPY,DIRCT,OP SCOP,EXC TUMR: ICD-10-PCS | Mod: ,,, | Performed by: OTOLARYNGOLOGY

## 2022-05-10 PROCEDURE — 27000716 HC OXISENSOR PROBE, ANY SIZE: Performed by: ANESTHESIOLOGY

## 2022-05-10 PROCEDURE — 71000015 HC POSTOP RECOV 1ST HR: Performed by: OTOLARYNGOLOGY

## 2022-05-10 PROCEDURE — D9220A PRA ANESTHESIA: ICD-10-PCS | Mod: CRNA,,, | Performed by: NURSE ANESTHETIST, CERTIFIED REGISTERED

## 2022-05-10 PROCEDURE — 36000708 HC OR TIME LEV III 1ST 15 MIN: Performed by: OTOLARYNGOLOGY

## 2022-05-10 PROCEDURE — 27000510 HC BLANKET BAIR HUGGER ANY SIZE: Performed by: ANESTHESIOLOGY

## 2022-05-10 PROCEDURE — 37000009 HC ANESTHESIA EA ADD 15 MINS: Performed by: OTOLARYNGOLOGY

## 2022-05-10 PROCEDURE — 63600175 PHARM REV CODE 636 W HCPCS: Performed by: NURSE ANESTHETIST, CERTIFIED REGISTERED

## 2022-05-10 PROCEDURE — 63600175 PHARM REV CODE 636 W HCPCS: Performed by: ANESTHESIOLOGY

## 2022-05-10 PROCEDURE — D9220A PRA ANESTHESIA: Mod: ANES,,, | Performed by: ANESTHESIOLOGY

## 2022-05-10 PROCEDURE — 27000655: Performed by: ANESTHESIOLOGY

## 2022-05-10 RX ORDER — FENTANYL CITRATE 50 UG/ML
INJECTION, SOLUTION INTRAMUSCULAR; INTRAVENOUS
Status: DISCONTINUED | OUTPATIENT
Start: 2022-05-10 | End: 2022-05-10

## 2022-05-10 RX ORDER — CEFAZOLIN SODIUM 1 G/3ML
INJECTION, POWDER, FOR SOLUTION INTRAMUSCULAR; INTRAVENOUS
Status: DISCONTINUED | OUTPATIENT
Start: 2022-05-10 | End: 2022-05-10

## 2022-05-10 RX ORDER — LIDOCAINE HYDROCHLORIDE 20 MG/ML
INJECTION, SOLUTION EPIDURAL; INFILTRATION; INTRACAUDAL; PERINEURAL
Status: DISCONTINUED | OUTPATIENT
Start: 2022-05-10 | End: 2022-05-10

## 2022-05-10 RX ORDER — LIDOCAINE HYDROCHLORIDE 10 MG/ML
1 INJECTION, SOLUTION EPIDURAL; INFILTRATION; INTRACAUDAL; PERINEURAL ONCE
Status: DISCONTINUED | OUTPATIENT
Start: 2022-05-10 | End: 2022-05-10 | Stop reason: HOSPADM

## 2022-05-10 RX ORDER — DEXAMETHASONE SODIUM PHOSPHATE 4 MG/ML
INJECTION, SOLUTION INTRA-ARTICULAR; INTRALESIONAL; INTRAMUSCULAR; INTRAVENOUS; SOFT TISSUE
Status: DISCONTINUED | OUTPATIENT
Start: 2022-05-10 | End: 2022-05-10

## 2022-05-10 RX ORDER — ROCURONIUM BROMIDE 10 MG/ML
INJECTION, SOLUTION INTRAVENOUS
Status: DISCONTINUED | OUTPATIENT
Start: 2022-05-10 | End: 2022-05-10

## 2022-05-10 RX ORDER — PROPOFOL 10 MG/ML
VIAL (ML) INTRAVENOUS
Status: DISCONTINUED | OUTPATIENT
Start: 2022-05-10 | End: 2022-05-10

## 2022-05-10 RX ORDER — SODIUM CHLORIDE, SODIUM LACTATE, POTASSIUM CHLORIDE, CALCIUM CHLORIDE 600; 310; 30; 20 MG/100ML; MG/100ML; MG/100ML; MG/100ML
INJECTION, SOLUTION INTRAVENOUS CONTINUOUS
Status: DISCONTINUED | OUTPATIENT
Start: 2022-05-10 | End: 2022-05-10 | Stop reason: HOSPADM

## 2022-05-10 RX ORDER — ONDANSETRON 2 MG/ML
4 INJECTION INTRAMUSCULAR; INTRAVENOUS DAILY PRN
Status: DISCONTINUED | OUTPATIENT
Start: 2022-05-10 | End: 2022-05-10 | Stop reason: HOSPADM

## 2022-05-10 RX ORDER — HYDROMORPHONE HYDROCHLORIDE 2 MG/ML
0.5 INJECTION, SOLUTION INTRAMUSCULAR; INTRAVENOUS; SUBCUTANEOUS EVERY 5 MIN PRN
Status: DISCONTINUED | OUTPATIENT
Start: 2022-05-10 | End: 2022-05-10 | Stop reason: HOSPADM

## 2022-05-10 RX ORDER — MEPERIDINE HYDROCHLORIDE 25 MG/ML
25 INJECTION INTRAMUSCULAR; INTRAVENOUS; SUBCUTANEOUS EVERY 10 MIN PRN
Status: DISCONTINUED | OUTPATIENT
Start: 2022-05-10 | End: 2022-05-10 | Stop reason: HOSPADM

## 2022-05-10 RX ORDER — DIPHENHYDRAMINE HYDROCHLORIDE 50 MG/ML
25 INJECTION INTRAMUSCULAR; INTRAVENOUS EVERY 6 HOURS PRN
Status: DISCONTINUED | OUTPATIENT
Start: 2022-05-10 | End: 2022-05-10 | Stop reason: HOSPADM

## 2022-05-10 RX ORDER — MORPHINE SULFATE 8 MG/ML
4 INJECTION INTRAMUSCULAR; INTRAVENOUS; SUBCUTANEOUS EVERY 5 MIN PRN
Status: DISCONTINUED | OUTPATIENT
Start: 2022-05-10 | End: 2022-05-10 | Stop reason: HOSPADM

## 2022-05-10 RX ORDER — HYDROCODONE BITARTRATE AND ACETAMINOPHEN 7.5; 325 MG/1; MG/1
1 TABLET ORAL EVERY 6 HOURS PRN
Status: DISCONTINUED | OUTPATIENT
Start: 2022-05-10 | End: 2022-05-10 | Stop reason: HOSPADM

## 2022-05-10 RX ADMIN — DEXAMETHASONE SODIUM PHOSPHATE 8 MG: 4 INJECTION, SOLUTION INTRA-ARTICULAR; INTRALESIONAL; INTRAMUSCULAR; INTRAVENOUS; SOFT TISSUE at 07:05

## 2022-05-10 RX ADMIN — CEFAZOLIN 2 G: 1 INJECTION, POWDER, FOR SOLUTION INTRAMUSCULAR; INTRAVENOUS; PARENTERAL at 07:05

## 2022-05-10 RX ADMIN — LIDOCAINE HYDROCHLORIDE 100 MG: 20 INJECTION, SOLUTION INTRAVENOUS at 07:05

## 2022-05-10 RX ADMIN — SODIUM CHLORIDE, POTASSIUM CHLORIDE, SODIUM LACTATE AND CALCIUM CHLORIDE: 600; 310; 30; 20 INJECTION, SOLUTION INTRAVENOUS at 07:05

## 2022-05-10 RX ADMIN — SUGAMMADEX 200 MG: 100 INJECTION, SOLUTION INTRAVENOUS at 07:05

## 2022-05-10 RX ADMIN — FENTANYL CITRATE 100 MCG: 50 INJECTION INTRAMUSCULAR; INTRAVENOUS at 07:05

## 2022-05-10 RX ADMIN — PROPOFOL 150 MG: 10 INJECTION, EMULSION INTRAVENOUS at 07:05

## 2022-05-10 RX ADMIN — ROCURONIUM BROMIDE 50 MG: 10 INJECTION, SOLUTION INTRAVENOUS at 07:05

## 2022-05-10 NOTE — OP NOTE
After general ET anesthesia a rigid laryngoscope was inserted atraumatically. With magnification the vocal cords were examined with a large polyp  on the left side was removed with biting forceps with care not to damage underlying muscle. The specimens were sent to pathology for permanent section There was no further bleeding the patient was then reversed and taken to RR in stable condition.

## 2022-05-10 NOTE — TRANSFER OF CARE
"Anesthesia Transfer of Care Note    Patient: Sami Kerns    Procedure(s) Performed: Procedure(s) (LRB):  LARYNGOSCOPY, DIRECT (Bilateral)  EXCISION, LESION, VOCAL CORD (Bilateral)    Patient location: PACU    Anesthesia Type: general    Transport from OR: Transported from OR on 100% O2 by closed face mask with adequate spontaneous ventilation    Post pain: adequate analgesia    Post assessment: no apparent anesthetic complications    Post vital signs: stable    Level of consciousness: responds to stimulation    Nausea/Vomiting: no nausea/vomiting    Complications: none    Transfer of care protocol was followed      Last vitals:   Visit Vitals  BP (!) 186/54 (BP Location: Right arm, Patient Position: Lying)   Pulse 66   Temp 37 °C (98.6 °F) (Oral)   Resp 18   Ht 5' 11" (1.803 m)   Wt 86.2 kg (190 lb)   SpO2 (!) 93%   BMI 26.50 kg/m²     "

## 2022-05-10 NOTE — ANESTHESIA PREPROCEDURE EVALUATION
"                                                                                                             05/10/2022  Sami Kerns is a 72 y.o., male.      Pre-op Assessment    I have reviewed the Patient Summary Reports.    I have reviewed the NPO Status.   I have reviewed the Medications.     Review of Systems         Anesthesia Plan  Type of Anesthesia, risks & benefits discussed:    Anesthesia Type: Gen ETT  Intra-op Monitoring Plan: Standard ASA Monitors  Post Op Pain Control Plan: IV/PO Opioids PRN  Induction:  IV  Informed Consent: Informed consent signed with the Patient and all parties understand the risks and agree with anesthesia plan.  All questions answered.   ASA Score: 3    Ready For Surgery From Anesthesia Perspective.     .  NPO greater than 8 hours  NAC  NKDA    Hct 35  Cr 2.2  9/23/21 PFT's: "Gas trapping with possible mild impairment in gas exchange."  1/28/22 EKG: SR 79  2/21/22 Pharmacologic Stress Test: "Perfusion imaging is suggestive of single vessel disease."; EF 51%    COPD (chronic obstructive pulmonary disease) CHF (congestive heart failure)   CAD (coronary artery disease) GERD (gastroesophageal reflux disease)   Gout HTN (hypertension)   Hyperlipidemia Osteoarthritis   Anticoagulant long-term use Screening for colon cancer   Iron deficiency anemia due to chronic blood loss Blood in stool   Adenomatous polyp of ascending colon Adenomatous polyp of transverse colon   Polyp of splenic flexure of colon Adenomatous polyp of descending colon   Urinary incontinence Orchitis   H/o cardiac stents  H/o CABG  CKD    Airway exam deferred (COVID precautions); adequate ROM at neck.      "

## 2022-05-10 NOTE — ANESTHESIA PROCEDURE NOTES
Intubation    Date/Time: 5/10/2022 7:48 AM  Performed by: Tulio Naik CRNA  Authorized by: Champ Hamilton MD     Intubation:     Induction:  Intravenous    Intubated:  Postinduction    Mask Ventilation:  Easy mask    Attempts:  1    Attempted By:  CRNA    Method of Intubation:  Direct    Blade:  Brayan 4    Laryngeal View Grade: Grade I - full view of cords      Difficult Airway Encountered?: No      Complications:  None    Airway Device:  Oral endotracheal tube    Airway Device Size:  6.0    Style/Cuff Inflation:  Cuffed    Inflation Amount (mL):  7    Tube secured:  21    Secured at:  The lips    Placement Verified By:  Capnometry    Complicating Factors:  None    Findings Post-Intubation:  BS equal bilateral and atraumatic/condition of teeth unchanged

## 2022-05-10 NOTE — PLAN OF CARE
PATIENT RETURNED TO ROOM 16 OPS. FAMILY MEMBER AT BEDSIDE. PATIENT HOARSE, BUT NO BLEEDING NOTED. COFFEE SERVED

## 2022-05-10 NOTE — ANESTHESIA POSTPROCEDURE EVALUATION
Anesthesia Post Evaluation    Patient: Sami Kerns    Procedure(s) Performed: Procedure(s) (LRB):  LARYNGOSCOPY, DIRECT (Bilateral)  EXCISION, LESION, VOCAL CORD (Bilateral)    Final Anesthesia Type: general      Patient location during evaluation: PACU  Patient participation: Yes- Able to Participate  Level of consciousness: awake and sedated  Post-procedure vital signs: reviewed and stable  Pain management: adequate  Airway patency: patent    PONV status at discharge: No PONV  Anesthetic complications: no      Cardiovascular status: blood pressure returned to baseline  Respiratory status: unassisted  Hydration status: euvolemic  Follow-up not needed.          Vitals Value Taken Time   /58 05/10/22 0915   Temp 36.4 °C (97.6 °F) 05/10/22 0915   Pulse 54 05/10/22 0927   Resp 16 05/10/22 0915   SpO2 97 % 05/10/22 0927   Vitals shown include unvalidated device data.      Event Time   Out of Recovery 08:33:00         Pain/Nicholas Score: Nicholas Score: 10 (5/10/2022  8:33 AM)

## 2022-05-11 LAB
ESTROGEN SERPL-MCNC: NORMAL PG/ML
INSULIN SERPL-ACNC: NORMAL U[IU]/ML
LAB AP GROSS DESCRIPTION: NORMAL
LAB AP LABORATORY NOTES: NORMAL
T3RU NFR SERPL: NORMAL %

## 2022-05-16 ENCOUNTER — ANESTHESIA (OUTPATIENT)
Dept: GASTROENTEROLOGY | Facility: HOSPITAL | Age: 72
End: 2022-05-16
Payer: MEDICARE

## 2022-05-16 ENCOUNTER — ANESTHESIA EVENT (OUTPATIENT)
Dept: GASTROENTEROLOGY | Facility: HOSPITAL | Age: 72
End: 2022-05-16
Payer: MEDICARE

## 2022-05-16 ENCOUNTER — HOSPITAL ENCOUNTER (OUTPATIENT)
Dept: GASTROENTEROLOGY | Facility: HOSPITAL | Age: 72
Discharge: HOME | End: 2022-05-16
Attending: NURSE PRACTITIONER
Payer: MEDICARE

## 2022-05-16 VITALS
OXYGEN SATURATION: 97 % | BODY MASS INDEX: 27.16 KG/M2 | HEIGHT: 71 IN | DIASTOLIC BLOOD PRESSURE: 47 MMHG | RESPIRATION RATE: 15 BRPM | HEART RATE: 55 BPM | TEMPERATURE: 99 F | WEIGHT: 194 LBS | SYSTOLIC BLOOD PRESSURE: 106 MMHG

## 2022-05-16 DIAGNOSIS — K44.9 HH (HIATUS HERNIA): ICD-10-CM

## 2022-05-16 DIAGNOSIS — K92.1 BLOOD IN STOOL: ICD-10-CM

## 2022-05-16 DIAGNOSIS — D64.9 ANEMIA, UNSPECIFIED TYPE: ICD-10-CM

## 2022-05-16 DIAGNOSIS — R49.0 HOARSENESS: ICD-10-CM

## 2022-05-16 DIAGNOSIS — R13.19 ESOPHAGEAL DYSPHAGIA: ICD-10-CM

## 2022-05-16 PROCEDURE — 43239 EGD BIOPSY SINGLE/MULTIPLE: CPT

## 2022-05-16 PROCEDURE — 88305 SURGICAL PATHOLOGY: ICD-10-PCS | Mod: 26,,, | Performed by: PATHOLOGY

## 2022-05-16 PROCEDURE — 88342 SURGICAL PATHOLOGY: ICD-10-PCS | Mod: 26,,, | Performed by: PATHOLOGY

## 2022-05-16 PROCEDURE — 88305 TISSUE EXAM BY PATHOLOGIST: CPT | Mod: SUR | Performed by: INTERNAL MEDICINE

## 2022-05-16 PROCEDURE — 63600175 PHARM REV CODE 636 W HCPCS: Performed by: NURSE ANESTHETIST, CERTIFIED REGISTERED

## 2022-05-16 PROCEDURE — 88342 IMHCHEM/IMCYTCHM 1ST ANTB: CPT | Mod: 26,,, | Performed by: PATHOLOGY

## 2022-05-16 PROCEDURE — 88305 TISSUE EXAM BY PATHOLOGIST: CPT | Mod: 26,,, | Performed by: PATHOLOGY

## 2022-05-16 PROCEDURE — C1889 IMPLANT/INSERT DEVICE, NOC: HCPCS

## 2022-05-16 PROCEDURE — 37000009 HC ANESTHESIA EA ADD 15 MINS

## 2022-05-16 PROCEDURE — 27201423 OPTIME MED/SURG SUP & DEVICES STERILE SUPPLY

## 2022-05-16 PROCEDURE — 37000008 HC ANESTHESIA 1ST 15 MINUTES

## 2022-05-16 PROCEDURE — 43450 DILATE ESOPHAGUS 1/MULT PASS: CPT

## 2022-05-16 PROCEDURE — 25000003 PHARM REV CODE 250: Performed by: NURSE ANESTHETIST, CERTIFIED REGISTERED

## 2022-05-16 PROCEDURE — D9220A PRA ANESTHESIA: ICD-10-PCS | Mod: ,,, | Performed by: NURSE ANESTHETIST, CERTIFIED REGISTERED

## 2022-05-16 PROCEDURE — D9220A PRA ANESTHESIA: Mod: ,,, | Performed by: NURSE ANESTHETIST, CERTIFIED REGISTERED

## 2022-05-16 PROCEDURE — 27000284 HC CANNULA NASAL: Performed by: NURSE ANESTHETIST, CERTIFIED REGISTERED

## 2022-05-16 RX ORDER — LIDOCAINE HYDROCHLORIDE 20 MG/ML
INJECTION, SOLUTION EPIDURAL; INFILTRATION; INTRACAUDAL; PERINEURAL
Status: DISCONTINUED | OUTPATIENT
Start: 2022-05-16 | End: 2022-05-16

## 2022-05-16 RX ORDER — SODIUM CHLORIDE 0.9 % (FLUSH) 0.9 %
10 SYRINGE (ML) INJECTION
Status: DISCONTINUED | OUTPATIENT
Start: 2022-05-16 | End: 2022-05-17 | Stop reason: HOSPADM

## 2022-05-16 RX ORDER — PROPOFOL 10 MG/ML
VIAL (ML) INTRAVENOUS
Status: DISCONTINUED | OUTPATIENT
Start: 2022-05-16 | End: 2022-05-16

## 2022-05-16 RX ADMIN — PROPOFOL 200 MG: 10 INJECTION, EMULSION INTRAVENOUS at 11:05

## 2022-05-16 RX ADMIN — LIDOCAINE HYDROCHLORIDE 100 MG: 20 INJECTION, SOLUTION EPIDURAL; INFILTRATION; INTRACAUDAL; PERINEURAL at 11:05

## 2022-05-16 NOTE — ANESTHESIA POSTPROCEDURE EVALUATION
Anesthesia Post Evaluation    Patient: Sami Kerns    Procedure(s) Performed: * No procedures listed *    Final Anesthesia Type: general      Patient location during evaluation: PACU  Patient participation: Yes- Able to Participate  Level of consciousness: awake and alert  Post-procedure vital signs: reviewed and stable  Pain management: adequate  Airway patency: patent    PONV status at discharge: No PONV  Anesthetic complications: no      Cardiovascular status: blood pressure returned to baseline  Respiratory status: unassisted and spontaneous ventilation  Hydration status: euvolemic  Follow-up not needed.          Vitals Value Taken Time   /75 05/16/22 1149   Temp 37 05/16/22 1149   Pulse 89 05/16/22 1149   Resp 16 05/16/22 1149   SpO2 100 05/16/22 1149         No case tracking events are documented in the log.      Pain/Nicholas Score: No data recorded

## 2022-05-16 NOTE — H&P
[facility]  Gastroenterology  H&P    Patient Name: Sami Kerns  MRN: [id number]  Admission Date: 5/16/2022  Code Status: Full Code    Attending Provider: Johnathon Zambrano MD  Primary Care Physician: Michael Weems MD  Principal Problem:<principal problem not specified>    Subjective:     History of Present Illness: Pt has had blood in stool, hoarseness and dysphagia; for egd with dilation.    Past Medical History:   Diagnosis Date    Adenomatous polyp of ascending colon 11/29/2021    Adenomatous polyp of descending colon 11/29/2021    Adenomatous polyp of transverse colon 11/29/2021    Anticoagulant long-term use     Blood in stool 11/29/2021    CAD (coronary artery disease)     CHF (congestive heart failure)     COPD (chronic obstructive pulmonary disease)     GERD (gastroesophageal reflux disease)     Gout     HTN (hypertension)     Hyperlipidemia     Iron deficiency anemia due to chronic blood loss 11/29/2021    Orchitis 1/20/2022    Osteoarthritis     Polyp of splenic flexure of colon 11/29/2021    Screening for colon cancer 11/29/2021    Urinary incontinence 1/20/2022       Past Surgical History:   Procedure Laterality Date    CORONARY ARTERY BYPASS GRAFT (CABG)      DIRECT LARYNGOSCOPY Bilateral 5/10/2022    Procedure: LARYNGOSCOPY, DIRECT;  Surgeon: Jesse Smalls MD;  Location: [facility];  Service: ENT;  Laterality: Bilateral;    TONSILLECTOMY      VOCAL FOLD LESION EXCISION Bilateral 5/10/2022    Procedure: EXCISION, LESION, VOCAL CORD;  Surgeon: Jesse Smalls MD;  Location: [facility];  Service: ENT;  Laterality: Bilateral;       Review of patient's allergies indicates:  No Known Allergies  Family History     Problem Relation (Age of Onset)    Hypertension Mother        Tobacco Use    Smoking status: Current Some Day Smoker     Packs/day: 1.00     Years: 55.00     Pack years: 55.00     Types: Cigarettes    Smokeless tobacco: Never Used    Tobacco  comment: at lisy smokes 1 cig/day trying to quit but refuses information   Substance and Sexual Activity    Alcohol use: Never    Drug use: Never    Sexual activity: Not Currently     Review of Systems   Respiratory: Negative.    Cardiovascular: Negative.    Gastrointestinal: Negative.      Objective:     Vital Signs (Most Recent):  Temp: 97.9 °F (36.6 °C) (05/16/22 1106)  Pulse: (!) 56 (05/16/22 1106)  Resp: 17 (05/16/22 1106)  BP: (!) 162/57 (05/16/22 1106)  SpO2: 97 % (05/16/22 1106) Vital Signs (24h Range):  Temp:  [97.9 °F (36.6 °C)] 97.9 °F (36.6 °C)  Pulse:  [56] 56  Resp:  [17] 17  SpO2:  [97 %] 97 %  BP: (162)/(57) 162/57     Weight: 88 kg (194 lb) (05/16/22 1106)  Body mass index is 27.06 kg/m².    No intake or output data in the 24 hours ending 05/16/22 1141    Lines/Drains/Airways     Peripheral Intravenous Line  Duration                Peripheral IV - Single Lumen 05/16/22 1106 22 G Anterior;Right Forearm <1 day                Physical Exam  Vitals reviewed.   Constitutional:       General: He is not in acute distress.     Appearance: Normal appearance. He is well-developed. He is not ill-appearing.   HENT:      Head: Normocephalic and atraumatic.      Nose: Nose normal.   Eyes:      Pupils: Pupils are equal, round, and reactive to light.   Cardiovascular:      Rate and Rhythm: Normal rate and regular rhythm.   Pulmonary:      Effort: Pulmonary effort is normal.      Breath sounds: Normal breath sounds. No wheezing.   Abdominal:      General: Abdomen is flat. Bowel sounds are normal. There is no distension.      Palpations: Abdomen is soft.      Tenderness: There is no abdominal tenderness. There is no guarding.   Skin:     General: Skin is warm and dry.      Coloration: Skin is not jaundiced.   Neurological:      Mental Status: He is alert.   Psychiatric:         Attention and Perception: Attention normal.         Mood and Affect: Affect normal.         Speech: Speech normal.         Behavior:  Behavior is cooperative.      Comments: Pt was calm while speaking.         Significant Labs:  CBC: No results for input(s): WBC, HGB, HCT, PLT in the last 48 hours.  CMP: No results for input(s): GLU, CALCIUM, ALBUMIN, PROT, NA, K, CO2, CL, BUN, CREATININE, ALKPHOS, ALT, AST, BILITOT in the last 48 hours.    Significant Imaging:  Imaging results within the past 24 hours have been reviewed.    Assessment/Plan:     There are no hospital problems to display for this patient.        Imp: esophageal dysphagia, hoarseness, blood in stool  Plan: egd with dilation    Johnathon Zambrano MD  Gastroenterology  [facility]

## 2022-05-16 NOTE — TRANSFER OF CARE
"Anesthesia Transfer of Care Note    Patient: Sami Kerns    Procedure(s) Performed: * No procedures listed *    Patient location: PACU    Anesthesia Type: general    Transport from OR: Transported from OR on room air with adequate spontaneous ventilation    Post pain: adequate analgesia    Post assessment: no apparent anesthetic complications and tolerated procedure well    Post vital signs: stable    Level of consciousness: awake    Nausea/Vomiting: no nausea    Complications: none    Transfer of care protocol was followed      Last vitals:   Visit Vitals  /75   Pulse 62   Temp 37 °C (98.6 °F)   Resp 16   Ht 5' 11" (1.803 m)   Wt 88 kg (194 lb)   SpO2 100%   BMI 27.06 kg/m²     "

## 2022-05-16 NOTE — ANESTHESIA PREPROCEDURE EVALUATION
05/16/2022  Sami Kerns is a 72 y.o., male.      Pre-op Assessment    I have reviewed the Patient Summary Reports.     I have reviewed the Nursing Notes. I have reviewed the NPO Status.   I have reviewed the Medications.     Review of Systems  Anesthesia Hx:  No problems with previous Anesthesia    Social:  Non-Smoker, No Alcohol Use    Hematology/Oncology:  Hematology Normal   Oncology Normal     EENT/Dental:EENT/Dental Normal   Cardiovascular:   Hypertension CAD   CHF hyperlipidemia    Pulmonary:   COPD    Renal/:  Renal/ Normal     Hepatic/GI:   GERD    Musculoskeletal:   Arthritis     Neurological:  Neurology Normal    Endocrine:  Endocrine Normal    Dermatological:  Skin Normal    Psych:  Psychiatric Normal           Physical Exam  General: Well nourished, Cooperative, Alert and Oriented    Airway:  Mallampati: II   Mouth Opening: Normal  TM Distance: Normal  Tongue: Normal  Neck ROM: Normal ROM    Dental:  Intact    Chest/Lungs:  Clear to auscultation, Normal Respiratory Rate    Heart:  Rate: Normal  Rhythm: Regular Rhythm  Sounds: Normal    Abdomen:  Normal, Soft, Nontender        Anesthesia Plan  Type of Anesthesia, risks & benefits discussed:    Anesthesia Type: Gen Natural Airway, MAC  Intra-op Monitoring Plan: Standard ASA Monitors  Post Op Pain Control Plan: multimodal analgesia and IV/PO Opioids PRN  Induction:  IV  Informed Consent: Informed consent signed with the Patient and all parties understand the risks and agree with anesthesia plan.  All questions answered.   ASA Score: 3  Day of Surgery Review of History & Physical: I have interviewed and examined the patient. I have reviewed the patient's H&P dated:     Ready For Surgery From Anesthesia Perspective.     .

## 2022-05-16 NOTE — DISCHARGE INSTRUCTIONS
Procedure Date  5/16/22     Impression  Overall Impression: Mucosa of the esophagus is grossly normal. Distal esophageal biopsies were obtained and the  esophagus was dilated with a 48F Cronin. A 2cm hiatus hernia is noted. Mild erythema was present in the antrum and biopsies were obtained. Mucosa of the duodenum is normal.     Recommendation  Await pathology results; avoid nsaids; repeat dilation prn.     DO NOT DRIVE FOR 24 HOURS OR OPERATE HEAVY MACHINERY.   DO NOT SIGN LEGAL DOCUMENTS.  DRINK PLENTY OF FLUIDS. RESUME DIET.

## 2022-05-18 ENCOUNTER — TELEPHONE (OUTPATIENT)
Dept: FAMILY MEDICINE | Facility: CLINIC | Age: 72
End: 2022-05-18
Payer: MEDICARE

## 2022-05-18 NOTE — TELEPHONE ENCOUNTER
----- Message from Michael Weems MD sent at 5/16/2022 12:06 PM CDT -----  Need to see next week abnl egd

## 2022-05-19 ENCOUNTER — OFFICE VISIT (OUTPATIENT)
Dept: OTOLARYNGOLOGY | Facility: CLINIC | Age: 72
End: 2022-05-19
Payer: MEDICARE

## 2022-05-19 VITALS — WEIGHT: 194 LBS | BODY MASS INDEX: 27.16 KG/M2 | HEIGHT: 71 IN

## 2022-05-19 DIAGNOSIS — Z98.890: Primary | ICD-10-CM

## 2022-05-19 DIAGNOSIS — J38.1 VOCAL CORD POLYPS: ICD-10-CM

## 2022-05-19 PROCEDURE — 1159F MED LIST DOCD IN RCRD: CPT | Mod: CPTII,,, | Performed by: OTOLARYNGOLOGY

## 2022-05-19 PROCEDURE — 3008F BODY MASS INDEX DOCD: CPT | Mod: CPTII,,, | Performed by: OTOLARYNGOLOGY

## 2022-05-19 PROCEDURE — 99214 OFFICE O/P EST MOD 30 MIN: CPT | Mod: S$PBB,,, | Performed by: OTOLARYNGOLOGY

## 2022-05-19 PROCEDURE — 99213 OFFICE O/P EST LOW 20 MIN: CPT | Mod: PBBFAC | Performed by: OTOLARYNGOLOGY

## 2022-05-19 PROCEDURE — 99214 PR OFFICE/OUTPT VISIT, EST, LEVL IV, 30-39 MIN: ICD-10-PCS | Mod: S$PBB,,, | Performed by: OTOLARYNGOLOGY

## 2022-05-19 PROCEDURE — 1159F PR MEDICATION LIST DOCUMENTED IN MEDICAL RECORD: ICD-10-PCS | Mod: CPTII,,, | Performed by: OTOLARYNGOLOGY

## 2022-05-19 PROCEDURE — 1160F PR REVIEW ALL MEDS BY PRESCRIBER/CLIN PHARMACIST DOCUMENTED: ICD-10-PCS | Mod: CPTII,,, | Performed by: OTOLARYNGOLOGY

## 2022-05-19 PROCEDURE — 1160F RVW MEDS BY RX/DR IN RCRD: CPT | Mod: CPTII,,, | Performed by: OTOLARYNGOLOGY

## 2022-05-19 PROCEDURE — 3008F PR BODY MASS INDEX (BMI) DOCUMENTED: ICD-10-PCS | Mod: CPTII,,, | Performed by: OTOLARYNGOLOGY

## 2022-05-19 NOTE — PROGRESS NOTES
Subjective:       Patient ID: Sami Kerns is a 72 y.o. male.    Chief Complaint: Follow-up (Vocal cord polyp removal)    HPI  Review of Systems   Constitutional: Negative for chills, fatigue and fever.   HENT: Negative for ear discharge, ear pain, sore throat, trouble swallowing and voice change.    Respiratory: Positive for shortness of breath. Negative for apnea, cough, choking and chest tightness.         SOB followed by Dr. Melchor (cardiology)   All other systems reviewed and are negative.        Objective:      Physical Exam  Constitutional:       Appearance: Normal appearance. He is normal weight.   HENT:      Head: Normocephalic.      Nose: Nose normal.      Mouth/Throat:      Mouth: Mucous membranes are moist.      Pharynx: Oropharynx is clear.   Eyes:      General: Lids are normal. Lids are everted, no foreign bodies appreciated.   Pulmonary:      Effort: Pulmonary effort is normal.   Skin:     General: Skin is warm and dry.   Neurological:      Mental Status: He is alert.   Psychiatric:         Mood and Affect: Mood normal.         Behavior: Behavior is cooperative.         Assessment:       Problem List Items Addressed This Visit    None     Visit Diagnoses     Status post excision of vocal cord nodule    -  Primary    Vocal cord polyps              Plan:   1. Reviewed biopsy results with patient.   Discussed smoking cessation permanently.   Follow up in 6 weeks.  May need other vocal cord stripped

## 2022-05-27 DIAGNOSIS — G47.30 SLEEP APNEA, UNSPECIFIED: Primary | ICD-10-CM

## 2022-06-14 ENCOUNTER — TELEPHONE (OUTPATIENT)
Dept: GASTROENTEROLOGY | Facility: HOSPITAL | Age: 72
End: 2022-06-14
Payer: MEDICARE

## 2022-06-14 NOTE — TELEPHONE ENCOUNTER
Attempted to call EGD path results with no answer. Letter sent.      ----- Message from Johnathon Zambrano MD sent at 5/23/2022  6:30 PM CDT -----  EGD bx shows gastritis and normal esophageal mucosa.

## 2022-06-14 NOTE — LETTER
June 14, 2022    Sami Kerns  [address] [facility] [place] [address]             [facility]  [address] [place] [address] [place] [address]  Phone: [phone]  Fax: [phone] Dear Mr. Kerns:    Our office has been unable to reach you to discuss your EGD pathology results.    Please call our office at [phone] Monday through Friday.     Sincerely,        Johnathon Zambrano MD

## 2022-06-16 ENCOUNTER — OFFICE VISIT (OUTPATIENT)
Dept: SURGERY | Facility: CLINIC | Age: 72
End: 2022-06-16
Payer: MEDICARE

## 2022-06-16 VITALS — HEIGHT: 71 IN | WEIGHT: 194 LBS | BODY MASS INDEX: 27.16 KG/M2

## 2022-06-16 DIAGNOSIS — I73.9 CLAUDICATION: Primary | ICD-10-CM

## 2022-06-16 DIAGNOSIS — I73.9 PVD (PERIPHERAL VASCULAR DISEASE): ICD-10-CM

## 2022-06-16 PROCEDURE — 1159F PR MEDICATION LIST DOCUMENTED IN MEDICAL RECORD: ICD-10-PCS | Mod: CPTII,,, | Performed by: SURGERY

## 2022-06-16 PROCEDURE — 99213 OFFICE O/P EST LOW 20 MIN: CPT | Mod: S$PBB,,, | Performed by: SURGERY

## 2022-06-16 PROCEDURE — 3008F PR BODY MASS INDEX (BMI) DOCUMENTED: ICD-10-PCS | Mod: CPTII,,, | Performed by: SURGERY

## 2022-06-16 PROCEDURE — 1160F PR REVIEW ALL MEDS BY PRESCRIBER/CLIN PHARMACIST DOCUMENTED: ICD-10-PCS | Mod: CPTII,,, | Performed by: SURGERY

## 2022-06-16 PROCEDURE — 99213 PR OFFICE/OUTPT VISIT, EST, LEVL III, 20-29 MIN: ICD-10-PCS | Mod: S$PBB,,, | Performed by: SURGERY

## 2022-06-16 PROCEDURE — 3008F BODY MASS INDEX DOCD: CPT | Mod: CPTII,,, | Performed by: SURGERY

## 2022-06-16 PROCEDURE — 1159F MED LIST DOCD IN RCRD: CPT | Mod: CPTII,,, | Performed by: SURGERY

## 2022-06-16 PROCEDURE — 99213 OFFICE O/P EST LOW 20 MIN: CPT | Mod: PBBFAC | Performed by: SURGERY

## 2022-06-16 PROCEDURE — 1160F RVW MEDS BY RX/DR IN RCRD: CPT | Mod: CPTII,,, | Performed by: SURGERY

## 2022-06-16 NOTE — PROGRESS NOTES
Subjective:       Patient ID: Sami Kerns is a 72 y.o. male.    Chief Complaint: Follow-up (6 mos f/u)  Status patient.  Smoke complains of left hip thigh claudication symptoms improved with rest occurs a short distance.  His previous arterial studies did not include ABIs we were able to however obtain them right MICHAEL is 0.52, is left MICHAEL is 0.59 waveforms suggests aortoiliac disease    He is admitting at this time some significant left leg claudication symptoms in the calf relieved with rest.    He states he has a appointment the cancer center for some abnormalities with his blood he has seen them with 28th of this month will see me in mid July depending on the results of this evaluation will give consideration to arteriogram left leg with possible intervention  family history includes Hypertension in his mother.  Past Medical History:   Diagnosis Date    Adenomatous polyp of ascending colon 11/29/2021    Adenomatous polyp of descending colon 11/29/2021    Adenomatous polyp of transverse colon 11/29/2021    Anticoagulant long-term use     Blood in stool 11/29/2021    CAD (coronary artery disease)     CHF (congestive heart failure)     COPD (chronic obstructive pulmonary disease)     GERD (gastroesophageal reflux disease)     Gout     HH (hiatus hernia) 5/16/2022    HTN (hypertension)     Hyperlipidemia     Iron deficiency anemia due to chronic blood loss 11/29/2021    Orchitis 1/20/2022    Osteoarthritis     Polyp of splenic flexure of colon 11/29/2021    Screening for colon cancer 11/29/2021    Urinary incontinence 1/20/2022      Past Surgical History:   Procedure Laterality Date    CORONARY ARTERY BYPASS GRAFT (CABG)      DIRECT LARYNGOSCOPY Bilateral 5/10/2022    Procedure: LARYNGOSCOPY, DIRECT;  Surgeon: Jesse Smalls MD;  Location: [facility] [place];  Service: ENT;  Laterality: Bilateral;    TONSILLECTOMY      VOCAL FOLD LESION EXCISION Bilateral 5/10/2022    Procedure: EXCISION,  "LESION, VOCAL CORD;  Surgeon: Jesse Smalls MD;  Location: [facility];  Service: ENT;  Laterality: Bilateral;       reports that he has been smoking cigarettes. He has a 55.00 pack-year smoking history. He has never used smokeless tobacco. He reports that he does not drink alcohol and does not use drugs.   HPI  Review of Systems      Objective:      Ht 5' 11" (1.803 m)   Wt 88 kg (194 lb)   BMI 27.06 kg/m²    Physical Exam  Constitutional:       Appearance: Normal appearance. He is normal weight.   Pulmonary:      Effort: Pulmonary effort is normal.   Musculoskeletal:      Cervical back: Normal range of motion.   Skin:     General: Skin is warm and dry.      Capillary Refill: Capillary refill takes less than 2 seconds.      Comments: No wounds no tissue loss decreased pedal pulses   Neurological:      General: No focal deficit present.      Mental Status: He is alert.   Psychiatric:         Mood and Affect: Mood normal.         Behavior: Behavior normal.         Thought Content: Thought content normal.         Judgment: Judgment normal.           Assessment:       1. Claudication    2. PVD (peripheral vascular disease)        Plan:       Follow-up in July consideration angiogram left leg with possible intervention pending the results of his hematology evaluation       "

## 2022-06-20 ENCOUNTER — TELEPHONE (OUTPATIENT)
Dept: GASTROENTEROLOGY | Facility: CLINIC | Age: 72
End: 2022-06-20
Payer: MEDICARE

## 2022-06-20 NOTE — TELEPHONE ENCOUNTER
Patient called stating he received a letter regarding EGD path results. Discussed results and recommendations with patient reminded patient on follow up appointment on 07/20/2022 with SARA Choe. Patient verbalized good understanding.

## 2022-07-19 ENCOUNTER — PROCEDURE VISIT (OUTPATIENT)
Dept: SLEEP MEDICINE | Facility: HOSPITAL | Age: 72
End: 2022-07-19
Payer: MEDICARE

## 2022-07-19 DIAGNOSIS — G47.30 SLEEP APNEA, UNSPECIFIED: ICD-10-CM

## 2022-07-19 PROCEDURE — 95810 POLYSOM 6/> YRS 4/> PARAM: CPT | Mod: PO

## 2022-07-20 ENCOUNTER — OFFICE VISIT (OUTPATIENT)
Dept: GASTROENTEROLOGY | Facility: CLINIC | Age: 72
End: 2022-07-20
Payer: MEDICARE

## 2022-07-20 ENCOUNTER — OFFICE VISIT (OUTPATIENT)
Dept: OTOLARYNGOLOGY | Facility: CLINIC | Age: 72
End: 2022-07-20
Payer: MEDICARE

## 2022-07-20 VITALS
WEIGHT: 199 LBS | DIASTOLIC BLOOD PRESSURE: 45 MMHG | HEIGHT: 71 IN | HEART RATE: 57 BPM | OXYGEN SATURATION: 98 % | BODY MASS INDEX: 27.86 KG/M2 | SYSTOLIC BLOOD PRESSURE: 107 MMHG

## 2022-07-20 VITALS — HEIGHT: 71 IN | WEIGHT: 194 LBS | BODY MASS INDEX: 27.16 KG/M2

## 2022-07-20 DIAGNOSIS — R13.19 OTHER DYSPHAGIA: ICD-10-CM

## 2022-07-20 DIAGNOSIS — J38.1 VOCAL CORD POLYPS: Primary | ICD-10-CM

## 2022-07-20 DIAGNOSIS — R13.19 ESOPHAGEAL DYSPHAGIA: Primary | ICD-10-CM

## 2022-07-20 DIAGNOSIS — K44.9 HH (HIATUS HERNIA): ICD-10-CM

## 2022-07-20 DIAGNOSIS — R49.0 HOARSENESS: ICD-10-CM

## 2022-07-20 DIAGNOSIS — D64.9 ANEMIA, UNSPECIFIED TYPE: ICD-10-CM

## 2022-07-20 PROCEDURE — 99214 OFFICE O/P EST MOD 30 MIN: CPT | Mod: PBBFAC,25 | Performed by: OTOLARYNGOLOGY

## 2022-07-20 PROCEDURE — 31575 PR LARYNGOSCOPY, FLEXIBLE; DIAGNOSTIC: ICD-10-PCS | Mod: S$PBB,,, | Performed by: OTOLARYNGOLOGY

## 2022-07-20 PROCEDURE — 1160F PR REVIEW ALL MEDS BY PRESCRIBER/CLIN PHARMACIST DOCUMENTED: ICD-10-PCS | Mod: CPTII,,, | Performed by: OTOLARYNGOLOGY

## 2022-07-20 PROCEDURE — 3008F PR BODY MASS INDEX (BMI) DOCUMENTED: ICD-10-PCS | Mod: CPTII,,, | Performed by: NURSE PRACTITIONER

## 2022-07-20 PROCEDURE — 99213 OFFICE O/P EST LOW 20 MIN: CPT | Mod: ,,, | Performed by: NURSE PRACTITIONER

## 2022-07-20 PROCEDURE — 1160F RVW MEDS BY RX/DR IN RCRD: CPT | Mod: CPTII,,, | Performed by: NURSE PRACTITIONER

## 2022-07-20 PROCEDURE — 3078F DIAST BP <80 MM HG: CPT | Mod: CPTII,,, | Performed by: NURSE PRACTITIONER

## 2022-07-20 PROCEDURE — 1160F PR REVIEW ALL MEDS BY PRESCRIBER/CLIN PHARMACIST DOCUMENTED: ICD-10-PCS | Mod: CPTII,,, | Performed by: NURSE PRACTITIONER

## 2022-07-20 PROCEDURE — 1101F PR PT FALLS ASSESS DOC 0-1 FALLS W/OUT INJ PAST YR: ICD-10-PCS | Mod: CPTII,,, | Performed by: NURSE PRACTITIONER

## 2022-07-20 PROCEDURE — 1160F RVW MEDS BY RX/DR IN RCRD: CPT | Mod: CPTII,,, | Performed by: OTOLARYNGOLOGY

## 2022-07-20 PROCEDURE — 99214 PR OFFICE/OUTPT VISIT, EST, LEVL IV, 30-39 MIN: ICD-10-PCS | Mod: S$PBB,25,, | Performed by: OTOLARYNGOLOGY

## 2022-07-20 PROCEDURE — 3074F SYST BP LT 130 MM HG: CPT | Mod: CPTII,,, | Performed by: NURSE PRACTITIONER

## 2022-07-20 PROCEDURE — 1101F PT FALLS ASSESS-DOCD LE1/YR: CPT | Mod: CPTII,,, | Performed by: NURSE PRACTITIONER

## 2022-07-20 PROCEDURE — 31575 DIAGNOSTIC LARYNGOSCOPY: CPT | Mod: S$PBB,,, | Performed by: OTOLARYNGOLOGY

## 2022-07-20 PROCEDURE — 3008F PR BODY MASS INDEX (BMI) DOCUMENTED: ICD-10-PCS | Mod: CPTII,,, | Performed by: OTOLARYNGOLOGY

## 2022-07-20 PROCEDURE — 1159F MED LIST DOCD IN RCRD: CPT | Mod: CPTII,,, | Performed by: OTOLARYNGOLOGY

## 2022-07-20 PROCEDURE — 1159F MED LIST DOCD IN RCRD: CPT | Mod: CPTII,,, | Performed by: NURSE PRACTITIONER

## 2022-07-20 PROCEDURE — 3008F BODY MASS INDEX DOCD: CPT | Mod: CPTII,,, | Performed by: OTOLARYNGOLOGY

## 2022-07-20 PROCEDURE — 3078F PR MOST RECENT DIASTOLIC BLOOD PRESSURE < 80 MM HG: ICD-10-PCS | Mod: CPTII,,, | Performed by: NURSE PRACTITIONER

## 2022-07-20 PROCEDURE — 1159F PR MEDICATION LIST DOCUMENTED IN MEDICAL RECORD: ICD-10-PCS | Mod: CPTII,,, | Performed by: OTOLARYNGOLOGY

## 2022-07-20 PROCEDURE — 31575 DIAGNOSTIC LARYNGOSCOPY: CPT | Mod: PBBFAC | Performed by: OTOLARYNGOLOGY

## 2022-07-20 PROCEDURE — 3008F BODY MASS INDEX DOCD: CPT | Mod: CPTII,,, | Performed by: NURSE PRACTITIONER

## 2022-07-20 PROCEDURE — 1159F PR MEDICATION LIST DOCUMENTED IN MEDICAL RECORD: ICD-10-PCS | Mod: CPTII,,, | Performed by: NURSE PRACTITIONER

## 2022-07-20 PROCEDURE — 3074F PR MOST RECENT SYSTOLIC BLOOD PRESSURE < 130 MM HG: ICD-10-PCS | Mod: CPTII,,, | Performed by: NURSE PRACTITIONER

## 2022-07-20 PROCEDURE — 99214 OFFICE O/P EST MOD 30 MIN: CPT | Mod: S$PBB,25,, | Performed by: OTOLARYNGOLOGY

## 2022-07-20 PROCEDURE — 3288F PR FALLS RISK ASSESSMENT DOCUMENTED: ICD-10-PCS | Mod: CPTII,,, | Performed by: NURSE PRACTITIONER

## 2022-07-20 PROCEDURE — 3288F FALL RISK ASSESSMENT DOCD: CPT | Mod: CPTII,,, | Performed by: NURSE PRACTITIONER

## 2022-07-20 PROCEDURE — 99213 PR OFFICE/OUTPT VISIT, EST, LEVL III, 20-29 MIN: ICD-10-PCS | Mod: ,,, | Performed by: NURSE PRACTITIONER

## 2022-07-20 NOTE — PROGRESS NOTES
Subjective:       Patient ID: Sami Kerns is a 72 y.o. male.    Chief Complaint: Other (6 month follow-up on throat. Pt denies swallowing trouble or changes in voice, states he is doing good. )    HPI  Review of Systems   HENT: Positive for voice change.    All other systems reviewed and are negative.      Objective:      Physical Exam  General: NAD Hoarse  Head: Normocephalic, atraumatic, no facial asymmetry/normal strength,  Ears: Both auricules normal in appearance, w/o deformities tympanic membranes normal external auditory canals normal  Nose: External nose w/o deformities normal turbinates no drainage or inflammation  Oral Cavity: Lips, gums, floor of mouth, tongue hard palate, and buccal mucosa without mass/lesion  Oropharynx: Mucosa pink and moist, soft palate, posterior pharynx and oropharyngeal wall without mass/lesion  Neck: Supple, symmetric, trachea midline, no palpable mass/lesion, no palpable cervical lymphadenopathy  Skin: Warm and dry, no concerning lesions  Respiratory: Respirations even, unlabored    Nasal/Nasopharyngo/Laryn/Hypopharyngoscopy Procedures    Procedure:  Diagnostic nasal, nasopharyngoscopy, laryngoscopy and hypopharyngoscopy.    Routine preparation with local atomizer with 1% neosynephrine and lidocaine . With customary flexible endoscope.     NOSE:   External:  No gross deformity   Intranasal:    Mucosa:  No polyps, ulcers or lesions.    Septum:  No gross deformity.    Turbinates:  Not enlarged.    Nasopharynx:  No lesions.   Mucosa:  No lesions.   Posterior Choanae:  Patent.   Eustachian Tubes:  Patent.  Larynx/hypopharynx:   Epiglottis:  No lesions, without edema.   AE Folds:  No lesions.   Vocal cords:  +  polyps; nl mobility   Subglottis: No obvious stenosis   Hypopharynx:  No lesions.   Piriform sinus:  No pooling or lesions.   Post Cricoid:  No edema or erythema    Assessment:       1. Vocal cord polyps    2. Other dysphagia    3. Hoarseness        Plan:       Left side  looks good right has large polyp he wants to wait to take off F/u 3 months

## 2022-07-20 NOTE — PROGRESS NOTES
Sami Kerns is a 72 y.o. male here for Follow-up        PCP: Michael Weems  Referring Provider: No referring provider defined for this encounter.     HPI:  Presents for follow up. EGD/Dilation 5/16/22, 2 cm hiatal hernia. Normal esophagus, gastritis. He denies dysphagia today. He is currently taking oral iron. Stool is black due to iron. No hematochezia. He was referred to Dr. Arenas for abnormal BECKY and possible monoclonal antibody. Reports are not available at this time. Reports that he saw Dr. Smalls today for a polyp on his vocal cord. Does have chronic hoarseness. States that his appetite is good. No weight loss. Last colonoscopy 11/29/21, repeat in 3 years.        ROS:  Review of Systems   Constitutional: Negative for activity change, appetite change, fatigue, fever and unexpected weight change.   HENT: Positive for voice change. Negative for trouble swallowing.    Respiratory: Positive for shortness of breath (chronic smoker). Negative for cough.    Cardiovascular: Negative for chest pain.   Gastrointestinal: Negative for abdominal distention, abdominal pain, blood in stool, change in bowel habit, constipation, diarrhea, nausea, vomiting, reflux and change in bowel habit.   Musculoskeletal: Negative for gait problem.   Integumentary:  Negative for color change.   Neurological: Negative for dizziness and light-headedness.   Psychiatric/Behavioral: Negative for sleep disturbance. The patient is not nervous/anxious.           PMHX:  has a past medical history of Adenomatous polyp of ascending colon (11/29/2021), Adenomatous polyp of descending colon (11/29/2021), Adenomatous polyp of transverse colon (11/29/2021), Anticoagulant long-term use, Blood in stool (11/29/2021), CAD (coronary artery disease), CHF (congestive heart failure), COPD (chronic obstructive pulmonary disease), GERD (gastroesophageal reflux disease), Gout, HH (hiatus hernia) (5/16/2022), HTN (hypertension), Hyperlipidemia, Iron  deficiency anemia due to chronic blood loss (11/29/2021), Orchitis (1/20/2022), Osteoarthritis, Polyp of splenic flexure of colon (11/29/2021), Screening for colon cancer (11/29/2021), and Urinary incontinence (1/20/2022).    PSHX:  has a past surgical history that includes Coronary Artery Bypass Graft (CABG); Tonsillectomy; Direct laryngoscopy (Bilateral, 5/10/2022); and Vocal fold lesion excision (Bilateral, 5/10/2022).    PFHX: family history includes Hypertension in his mother.    PSlHX:  reports that he has been smoking cigarettes. He has a 55.00 pack-year smoking history. He has never used smokeless tobacco. He reports that he does not drink alcohol and does not use drugs.        Review of patient's allergies indicates:  No Known Allergies    Medication List with Changes/Refills   Current Medications    AMIODARONE (PACERONE) 200 MG TAB    Take by mouth once daily.    ASCORBIC ACID, VITAMIN C, (VITAMIN C) 1000 MG TABLET    Take 1,000 mg by mouth once daily.    ASPIRIN (ECOTRIN) 81 MG EC TABLET    Take 81 mg by mouth once daily.    ATORVASTATIN (LIPITOR) 80 MG TABLET    Take 1 tablet by mouth once daily.    CILOSTAZOL (PLETAL) 100 MG TAB    Take 100 mg by mouth 2 (two) times daily.    CLOPIDOGREL (PLAVIX) 75 MG TABLET    Take 75 mg by mouth once daily.    EZETIMIBE (ZETIA) 10 MG TABLET    Take 10 mg by mouth once daily.    FUROSEMIDE (LASIX) 40 MG TABLET    Take 40 mg by mouth once daily.    HYDRALAZINE (APRESOLINE) 50 MG TABLET    Take 50 mg by mouth 3 (three) times daily.    LOSARTAN (COZAAR) 100 MG TABLET    Take 100 mg by mouth once daily.    METOPROLOL SUCCINATE (TOPROL-XL) 50 MG 24 HR TABLET    Take 1 tablet by mouth once daily.    NAPROXEN (NAPROSYN) 500 MG TABLET    Take 1 tablet (500 mg total) by mouth 2 (two) times daily as needed (pain).    PROPRANOLOL (INDERAL) 20 MG TABLET    TAKE ONE TABLET BY MOUTH TWICE DAILY    TAMSULOSIN (FLOMAX) 0.4 MG CAP    Take 1 capsule (0.4 mg total) by mouth once daily.  "       Objective Findings:  Vital Signs:  BP (!) 107/45   Pulse (!) 57   Ht 5' 11" (1.803 m)   Wt 90.3 kg (199 lb)   SpO2 98%   BMI 27.75 kg/m²  Body mass index is 27.75 kg/m².    Physical Exam:  Physical Exam  Vitals and nursing note reviewed.   Constitutional:       General: He is not in acute distress.     Appearance: Normal appearance.   HENT:      Mouth/Throat:      Mouth: Mucous membranes are moist.   Cardiovascular:      Rate and Rhythm: Normal rate.   Pulmonary:      Breath sounds: No wheezing, rhonchi or rales.   Abdominal:      General: Bowel sounds are normal. There is no distension.      Palpations: Abdomen is soft. There is no mass.      Tenderness: There is no abdominal tenderness. There is no guarding or rebound.      Hernia: No hernia is present.   Musculoskeletal:      Right lower leg: No edema.      Left lower leg: No edema.   Skin:     General: Skin is warm and dry.      Coloration: Skin is not jaundiced or pale.   Neurological:      Mental Status: He is alert and oriented to person, place, and time.   Psychiatric:         Mood and Affect: Mood normal.          Labs:  Lab Results   Component Value Date    WBC 5.23 04/20/2022    HGB 11.4 (L) 04/20/2022    HCT 34.9 (L) 04/20/2022    MCV 75.1 (L) 04/20/2022    RDW 17.7 (H) 04/20/2022     04/20/2022    LYMPH 33.3 04/20/2022    LYMPH 1.74 04/20/2022    MONO 10.3 (H) 04/20/2022    EOS 0.16 04/20/2022    BASO 0.03 04/20/2022     Lab Results   Component Value Date     04/20/2022    K 4.2 04/20/2022     04/20/2022    CO2 22 04/20/2022     (H) 04/20/2022    BUN 25 (H) 04/20/2022    CREATININE 2.16 (H) 04/20/2022    CALCIUM 9.2 04/20/2022    PROT 8.4 (H) 04/20/2022    PROT 8.1 04/20/2022    ALBUMIN 3.6 04/20/2022    BILITOT 0.4 04/20/2022    ALKPHOS 109 04/20/2022    AST 27 04/20/2022    ALT 43 04/20/2022         Imaging: No results found.      Assessment:  Sami Kerns is a 72 y.o. male here with:  1. Esophageal dysphagia  "   2. HH (hiatus hernia)    3. Anemia, unspecified type          Recommendations:  1. Dysphagia improved, may repeat EGD/Dilation if needed  2. Small frequent meals. Avoid spicy and greasy foods  3. Keep follow up appointments with Dr. Arenas  4. Will need repeat colonoscopy after 11/29/24  5. Recommend smoking cessation    Follow up in about 6 months (around 1/20/2023).      Order summary:       Thank you for allowing me to participate in the care of Sami Kerns.      JAYNE SuC

## 2022-09-06 PROBLEM — N45.2 ORCHITIS: Status: RESOLVED | Noted: 2022-01-20 | Resolved: 2022-09-06

## 2022-10-19 ENCOUNTER — OFFICE VISIT (OUTPATIENT)
Dept: OTOLARYNGOLOGY | Facility: CLINIC | Age: 72
End: 2022-10-19
Payer: MEDICARE

## 2022-10-19 DIAGNOSIS — J38.1 VOCAL CORD POLYPS: Primary | ICD-10-CM

## 2022-10-19 DIAGNOSIS — R49.0 HOARSENESS: ICD-10-CM

## 2022-10-19 PROCEDURE — 99214 PR OFFICE/OUTPT VISIT, EST, LEVL IV, 30-39 MIN: ICD-10-PCS | Mod: S$PBB,25,, | Performed by: OTOLARYNGOLOGY

## 2022-10-19 PROCEDURE — 1160F PR REVIEW ALL MEDS BY PRESCRIBER/CLIN PHARMACIST DOCUMENTED: ICD-10-PCS | Mod: CPTII,,, | Performed by: OTOLARYNGOLOGY

## 2022-10-19 PROCEDURE — 1159F MED LIST DOCD IN RCRD: CPT | Mod: CPTII,,, | Performed by: OTOLARYNGOLOGY

## 2022-10-19 PROCEDURE — 1159F PR MEDICATION LIST DOCUMENTED IN MEDICAL RECORD: ICD-10-PCS | Mod: CPTII,,, | Performed by: OTOLARYNGOLOGY

## 2022-10-19 PROCEDURE — 31575 PR LARYNGOSCOPY, FLEXIBLE; DIAGNOSTIC: ICD-10-PCS | Mod: S$PBB,,, | Performed by: OTOLARYNGOLOGY

## 2022-10-19 PROCEDURE — 31575 DIAGNOSTIC LARYNGOSCOPY: CPT | Mod: S$PBB,,, | Performed by: OTOLARYNGOLOGY

## 2022-10-19 PROCEDURE — 31575 DIAGNOSTIC LARYNGOSCOPY: CPT | Mod: PBBFAC | Performed by: OTOLARYNGOLOGY

## 2022-10-19 PROCEDURE — 4010F PR ACE/ARB THEARPY RXD/TAKEN: ICD-10-PCS | Mod: CPTII,,, | Performed by: OTOLARYNGOLOGY

## 2022-10-19 PROCEDURE — 99212 OFFICE O/P EST SF 10 MIN: CPT | Mod: PBBFAC,25 | Performed by: OTOLARYNGOLOGY

## 2022-10-19 PROCEDURE — 4010F ACE/ARB THERAPY RXD/TAKEN: CPT | Mod: CPTII,,, | Performed by: OTOLARYNGOLOGY

## 2022-10-19 PROCEDURE — 1160F RVW MEDS BY RX/DR IN RCRD: CPT | Mod: CPTII,,, | Performed by: OTOLARYNGOLOGY

## 2022-10-19 PROCEDURE — 99214 OFFICE O/P EST MOD 30 MIN: CPT | Mod: S$PBB,25,, | Performed by: OTOLARYNGOLOGY

## 2022-10-19 NOTE — H&P (VIEW-ONLY)
Subjective:       Patient ID: Sami Kerns is a 72 y.o. male.    Chief Complaint: Hoarse (Had polyp removed from left side now needs removal from opposite side)    HPI  Review of Systems   HENT:  Positive for voice change.    All other systems reviewed and are negative.    Objective:      Physical Exam  General: NAD Hoarse  Head: Normocephalic, atraumatic, no facial asymmetry/normal strength,  Ears: Both auricules normal in appearance, w/o deformities tympanic membranes normal external auditory canals normal  Nose: External nose w/o deformities normal turbinates no drainage or inflammation  Oral Cavity: Lips, gums, floor of mouth, tongue hard palate, and buccal mucosa without mass/lesion  Oropharynx: Mucosa pink and moist, soft palate, posterior pharynx and oropharyngeal wall without mass/lesion  Neck: Supple, symmetric, trachea midline, no palpable mass/lesion, no palpable cervical lymphadenopathy  Skin: Warm and dry, no concerning lesions  Respiratory: Respirations even, unlabored     Nasal/Nasopharyngo/Laryn/Hypopharyngoscopy Procedures    Procedure:  Diagnostic nasal, nasopharyngoscopy, laryngoscopy and hypopharyngoscopy.    Routine preparation with local atomizer with 1% neosynephrine and lidocaine . With customary flexible endoscope.     NOSE:   External:  No gross deformity   Intranasal:    Mucosa:  No polyps, ulcers or lesions.    Septum:  No gross deformity.    Turbinates:  Not enlarged.    Nasopharynx:  No lesions.   Mucosa:  No lesions.   Adenoids:  Present.   Posterior Choanae:  Patent.   Eustachian Tubes:  Patent.  Larynx/hypopharynx:   Epiglottis:  No lesions, without edema.   AE Folds:  No lesions.   Vocal cords:  polyps on right ; nl mobility   Subglottis: No obvious stenosis   Hypopharynx:  No lesions.   Piriform sinus:  No pooling or lesions.   Post Cricoid:  No edema or erythema     Assessment:       1. Vocal cord polyps    2. Hoarseness        Plan:       DL with removal polyps

## 2022-10-27 ENCOUNTER — OFFICE VISIT (OUTPATIENT)
Dept: PULMONOLOGY | Facility: CLINIC | Age: 72
End: 2022-10-27
Payer: MEDICARE

## 2022-10-27 VITALS
WEIGHT: 203 LBS | RESPIRATION RATE: 16 BRPM | BODY MASS INDEX: 28.42 KG/M2 | HEIGHT: 71 IN | OXYGEN SATURATION: 100 % | DIASTOLIC BLOOD PRESSURE: 62 MMHG | SYSTOLIC BLOOD PRESSURE: 130 MMHG | HEART RATE: 52 BPM

## 2022-10-27 DIAGNOSIS — J44.9 CHRONIC OBSTRUCTIVE PULMONARY DISEASE, UNSPECIFIED COPD TYPE: ICD-10-CM

## 2022-10-27 PROCEDURE — 1159F PR MEDICATION LIST DOCUMENTED IN MEDICAL RECORD: ICD-10-PCS | Mod: CPTII,,, | Performed by: INTERNAL MEDICINE

## 2022-10-27 PROCEDURE — 1101F PT FALLS ASSESS-DOCD LE1/YR: CPT | Mod: CPTII,,, | Performed by: INTERNAL MEDICINE

## 2022-10-27 PROCEDURE — 3075F SYST BP GE 130 - 139MM HG: CPT | Mod: CPTII,,, | Performed by: INTERNAL MEDICINE

## 2022-10-27 PROCEDURE — 4010F ACE/ARB THERAPY RXD/TAKEN: CPT | Mod: CPTII,,, | Performed by: INTERNAL MEDICINE

## 2022-10-27 PROCEDURE — 1101F PR PT FALLS ASSESS DOC 0-1 FALLS W/OUT INJ PAST YR: ICD-10-PCS | Mod: CPTII,,, | Performed by: INTERNAL MEDICINE

## 2022-10-27 PROCEDURE — 99213 PR OFFICE/OUTPT VISIT, EST, LEVL III, 20-29 MIN: ICD-10-PCS | Mod: S$PBB,,, | Performed by: INTERNAL MEDICINE

## 2022-10-27 PROCEDURE — 4010F PR ACE/ARB THEARPY RXD/TAKEN: ICD-10-PCS | Mod: CPTII,,, | Performed by: INTERNAL MEDICINE

## 2022-10-27 PROCEDURE — 99214 OFFICE O/P EST MOD 30 MIN: CPT | Mod: PBBFAC | Performed by: INTERNAL MEDICINE

## 2022-10-27 PROCEDURE — 99213 OFFICE O/P EST LOW 20 MIN: CPT | Mod: S$PBB,,, | Performed by: INTERNAL MEDICINE

## 2022-10-27 PROCEDURE — 1126F AMNT PAIN NOTED NONE PRSNT: CPT | Mod: CPTII,,, | Performed by: INTERNAL MEDICINE

## 2022-10-27 PROCEDURE — 1159F MED LIST DOCD IN RCRD: CPT | Mod: CPTII,,, | Performed by: INTERNAL MEDICINE

## 2022-10-27 PROCEDURE — 3288F PR FALLS RISK ASSESSMENT DOCUMENTED: ICD-10-PCS | Mod: CPTII,,, | Performed by: INTERNAL MEDICINE

## 2022-10-27 PROCEDURE — 3075F PR MOST RECENT SYSTOLIC BLOOD PRESS GE 130-139MM HG: ICD-10-PCS | Mod: CPTII,,, | Performed by: INTERNAL MEDICINE

## 2022-10-27 PROCEDURE — 3078F DIAST BP <80 MM HG: CPT | Mod: CPTII,,, | Performed by: INTERNAL MEDICINE

## 2022-10-27 PROCEDURE — 1126F PR PAIN SEVERITY QUANTIFIED, NO PAIN PRESENT: ICD-10-PCS | Mod: CPTII,,, | Performed by: INTERNAL MEDICINE

## 2022-10-27 PROCEDURE — 3288F FALL RISK ASSESSMENT DOCD: CPT | Mod: CPTII,,, | Performed by: INTERNAL MEDICINE

## 2022-10-27 PROCEDURE — 3078F PR MOST RECENT DIASTOLIC BLOOD PRESSURE < 80 MM HG: ICD-10-PCS | Mod: CPTII,,, | Performed by: INTERNAL MEDICINE

## 2022-10-27 RX ORDER — FERROUS SULFATE 325(65) MG
325 TABLET ORAL 3 TIMES DAILY
COMMUNITY
End: 2023-02-15 | Stop reason: SDUPTHER

## 2022-10-27 RX ORDER — ALBUTEROL SULFATE 90 UG/1
2 AEROSOL, METERED RESPIRATORY (INHALATION) EVERY 6 HOURS PRN
Qty: 18 G | Refills: 5 | Status: SHIPPED | OUTPATIENT
Start: 2022-10-27 | End: 2023-10-16 | Stop reason: SDUPTHER

## 2022-10-27 NOTE — ASSESSMENT & PLAN NOTE
Mild obstructive ventilatory impairment does not use inhaler will give him a p.r.n. inhaler he is able to carry out his activities of daily living no new recommendations come and see me p.r.n.   done

## 2022-10-27 NOTE — PROGRESS NOTES
Subjective:       Patient ID: Sami Kerns is a 72 y.o. male.    Chief Complaint: Shortness of Breath (Six month follow-up.  Hx COPD.  Reports has SOB with walking, hubert with walking distances.)    Shortness of Breath  This is a chronic problem. The current episode started more than 1 month ago. The problem has been unchanged. Pertinent negatives include no abdominal pain, chest pain, ear pain, headaches or rash. The symptoms are aggravated by exercise.   Past Medical History:   Diagnosis Date    Adenomatous polyp of ascending colon 11/29/2021    Adenomatous polyp of descending colon 11/29/2021    Adenomatous polyp of transverse colon 11/29/2021    Anticoagulant long-term use     Blood in stool 11/29/2021    CAD (coronary artery disease)     CHF (congestive heart failure)     COPD (chronic obstructive pulmonary disease)     SOB with walking distances 10/2022    GERD (gastroesophageal reflux disease)     Gout     HH (hiatus hernia) 05/16/2022    HTN (hypertension)     Hyperlipidemia     Iron deficiency anemia due to chronic blood loss 11/29/2021    follows with : Anemia, Monoclonal Gammopathy    Orchitis 01/20/2022    Osteoarthritis     Polyp of splenic flexure of colon 11/29/2021    Screening for colon cancer 11/29/2021    Urinary incontinence 01/20/2022    Vocal cord polyps     followed by  2022     Past Surgical History:   Procedure Laterality Date    CORONARY ARTERY BYPASS GRAFT (CABG)      DIRECT LARYNGOSCOPY Bilateral 05/10/2022    Procedure: LARYNGOSCOPY, DIRECT;  Surgeon: Jesse Smalls MD;  Location: [facility] OR;  Service: ENT;  Laterality: Bilateral;    TONSILLECTOMY N/A     at age 18    VOCAL FOLD LESION EXCISION Bilateral 05/10/2022    Procedure: EXCISION, LESION, VOCAL CORD;  Surgeon: Jesse Smalls MD;  Location: [facility] OR;  Service: ENT;  Laterality: Bilateral;     Family History   Problem Relation Age of Onset    Hypertension Mother      Review of patient's allergies  indicates:  No Known Allergies   Social History     Tobacco Use    Smoking status: Some Days     Packs/day: 1.00     Years: 55.00     Pack years: 55.00     Types: Cigarettes    Smokeless tobacco: Never    Tobacco comments:     at l[place] smokes 1 cig/day trying to quit but refuses information   Substance Use Topics    Alcohol use: Never    Drug use: Never      Review of Systems   Constitutional:  Negative for chills, activity change and night sweats.   HENT:  Negative for congestion and ear pain.    Eyes:  Negative for redness and itching.   Respiratory:  Positive for shortness of breath.    Cardiovascular:  Negative for chest pain and palpitations.   Musculoskeletal:  Negative for arthralgias and back pain.   Skin:  Negative for rash.   Gastrointestinal:  Negative for abdominal pain and abdominal distention.   Neurological:  Negative for dizziness and headaches.   Hematological:  Negative for adenopathy. Does not bruise/bleed easily.   Psychiatric/Behavioral:  Negative for confusion. The patient is not nervous/anxious.      Objective:      Physical Exam   Constitutional: He is oriented to person, place, and time. He appears well-developed and well-nourished.   HENT:   Head: Normocephalic.   Nose: Nose normal.   Mouth/Throat: Oropharynx is clear and moist.   Neck: No JVD present. No thyromegaly present.   Cardiovascular: Normal rate, regular rhythm, normal heart sounds and intact distal pulses.   Pulmonary/Chest: Normal expansion, hyperinflation, symmetric chest wall expansion, effort normal and breath sounds normal.   Abdominal: Soft. Bowel sounds are normal.   Musculoskeletal:         General: Normal range of motion.      Cervical back: Normal range of motion and neck supple.   Lymphadenopathy: No supraclavicular adenopathy is present.     He has no cervical adenopathy.   Neurological: He is alert and oriented to person, place, and time. He has normal reflexes.   Skin: Skin is warm and dry.   Psychiatric: He has a  normal mood and affect. His behavior is normal.   Personal Diagnostic Review  none pertinent    No flowsheet data found.      Assessment:       1. Chronic obstructive pulmonary disease, unspecified COPD type          Outpatient Encounter Medications as of 10/27/2022   Medication Sig Dispense Refill    amiodarone (PACERONE) 200 MG Tab Take by mouth once daily.      aspirin (ECOTRIN) 81 MG EC tablet Take 81 mg by mouth once daily.      atorvastatin (LIPITOR) 80 MG tablet Take 1 tablet by mouth once daily.      cilostazoL (PLETAL) 100 MG Tab Take 100 mg by mouth 2 (two) times daily.      clopidogreL (PLAVIX) 75 mg tablet Take 75 mg by mouth once daily.      furosemide (LASIX) 40 MG tablet Take 40 mg by mouth once daily.      hydrALAZINE (APRESOLINE) 50 MG tablet Take 50 mg by mouth 3 (three) times daily.      losartan (COZAAR) 100 MG tablet Take 100 mg by mouth once daily.      metoprolol succinate (TOPROL-XL) 50 MG 24 hr tablet Take 1 tablet by mouth once daily.      propranoloL (INDERAL) 20 MG tablet TAKE ONE TABLET BY MOUTH TWICE DAILY 60 tablet 1    tamsulosin (FLOMAX) 0.4 mg Cap Take 1 capsule (0.4 mg total) by mouth once daily. 90 capsule 3    albuterol (VENTOLIN HFA) 90 mcg/actuation inhaler Inhale 2 puffs into the lungs every 6 (six) hours as needed for Wheezing. Rescue 18 g 5    ascorbic acid, vitamin C, (VITAMIN C) 1000 MG tablet Take 1,000 mg by mouth once daily.      ezetimibe (ZETIA) 10 mg tablet Take 10 mg by mouth once daily.      ferrous sulfate (IRON, FERROUS SULFATE,) 325 mg (65 mg iron) Tab tablet Take 325 mg by mouth 3 (three) times daily.      naproxen (NAPROSYN) 500 MG tablet Take 1 tablet (500 mg total) by mouth 2 (two) times daily as needed (pain). (Patient not taking: Reported on 10/27/2022) 20 tablet 1     No facility-administered encounter medications on file as of 10/27/2022.     No orders of the defined types were placed in this encounter.      Plan:       Problem List Items Addressed This  Visit          Pulmonary    COPD (chronic obstructive pulmonary disease)     Mild obstructive ventilatory impairment does not use inhaler will give him a p.r.n. inhaler he is able to carry out his activities of daily living no new recommendations come and see me p.r.n.

## 2022-11-07 ENCOUNTER — TELEPHONE (OUTPATIENT)
Dept: OTOLARYNGOLOGY | Facility: CLINIC | Age: 72
End: 2022-11-07
Payer: MEDICARE

## 2022-11-09 DIAGNOSIS — Z71.89 COMPLEX CARE COORDINATION: ICD-10-CM

## 2022-11-11 ENCOUNTER — ANESTHESIA (OUTPATIENT)
Dept: SURGERY | Facility: HOSPITAL | Age: 72
End: 2022-11-11
Payer: MEDICARE

## 2022-11-11 ENCOUNTER — HOSPITAL ENCOUNTER (OUTPATIENT)
Facility: HOSPITAL | Age: 72
Discharge: HOME | End: 2022-11-11
Attending: OTOLARYNGOLOGY | Admitting: OTOLARYNGOLOGY
Payer: MEDICARE

## 2022-11-11 ENCOUNTER — ANESTHESIA EVENT (OUTPATIENT)
Dept: SURGERY | Facility: HOSPITAL | Age: 72
End: 2022-11-11
Payer: MEDICARE

## 2022-11-11 VITALS
SYSTOLIC BLOOD PRESSURE: 147 MMHG | WEIGHT: 199 LBS | BODY MASS INDEX: 29.47 KG/M2 | TEMPERATURE: 98 F | OXYGEN SATURATION: 96 % | HEIGHT: 69 IN | HEART RATE: 49 BPM | DIASTOLIC BLOOD PRESSURE: 66 MMHG | RESPIRATION RATE: 18 BRPM

## 2022-11-11 DIAGNOSIS — J38.1 POLYP, VOCAL CORD: Primary | ICD-10-CM

## 2022-11-11 DIAGNOSIS — J38.1 VOCAL CORD POLYPS: ICD-10-CM

## 2022-11-11 DIAGNOSIS — J38.2 VOCAL CORD NODULES: ICD-10-CM

## 2022-11-11 PROCEDURE — 37000009 HC ANESTHESIA EA ADD 15 MINS: Performed by: OTOLARYNGOLOGY

## 2022-11-11 PROCEDURE — D9220A PRA ANESTHESIA: ICD-10-PCS | Mod: CRNA,,, | Performed by: NURSE ANESTHETIST, CERTIFIED REGISTERED

## 2022-11-11 PROCEDURE — 88305 SURGICAL PATHOLOGY: ICD-10-PCS | Mod: 26,,, | Performed by: PATHOLOGY

## 2022-11-11 PROCEDURE — 63600175 PHARM REV CODE 636 W HCPCS: Performed by: NURSE ANESTHETIST, CERTIFIED REGISTERED

## 2022-11-11 PROCEDURE — 71000015 HC POSTOP RECOV 1ST HR: Performed by: OTOLARYNGOLOGY

## 2022-11-11 PROCEDURE — 36000708 HC OR TIME LEV III 1ST 15 MIN: Performed by: OTOLARYNGOLOGY

## 2022-11-11 PROCEDURE — D9220A PRA ANESTHESIA: ICD-10-PCS | Mod: ANES,,, | Performed by: ANESTHESIOLOGY

## 2022-11-11 PROCEDURE — 31540 PR LARYNGOSCOPY,DIRCT,OP,EXC TUMOR: ICD-10-PCS | Mod: ,,, | Performed by: OTOLARYNGOLOGY

## 2022-11-11 PROCEDURE — 88305 TISSUE EXAM BY PATHOLOGIST: CPT | Mod: 26,,, | Performed by: PATHOLOGY

## 2022-11-11 PROCEDURE — 31540 LARYNGOSCOPY W/EXC OF TUMOR: CPT | Mod: ,,, | Performed by: OTOLARYNGOLOGY

## 2022-11-11 PROCEDURE — 25000003 PHARM REV CODE 250: Performed by: NURSE ANESTHETIST, CERTIFIED REGISTERED

## 2022-11-11 PROCEDURE — 88305 TISSUE EXAM BY PATHOLOGIST: CPT | Mod: SUR | Performed by: OTOLARYNGOLOGY

## 2022-11-11 PROCEDURE — 71000033 HC RECOVERY, INTIAL HOUR: Performed by: OTOLARYNGOLOGY

## 2022-11-11 PROCEDURE — 36000709 HC OR TIME LEV III EA ADD 15 MIN: Performed by: OTOLARYNGOLOGY

## 2022-11-11 PROCEDURE — D9220A PRA ANESTHESIA: Mod: ANES,,, | Performed by: ANESTHESIOLOGY

## 2022-11-11 PROCEDURE — D9220A PRA ANESTHESIA: Mod: CRNA,,, | Performed by: NURSE ANESTHETIST, CERTIFIED REGISTERED

## 2022-11-11 PROCEDURE — 37000008 HC ANESTHESIA 1ST 15 MINUTES: Performed by: OTOLARYNGOLOGY

## 2022-11-11 RX ORDER — ROCURONIUM BROMIDE 10 MG/ML
INJECTION, SOLUTION INTRAVENOUS
Status: DISCONTINUED | OUTPATIENT
Start: 2022-11-11 | End: 2022-11-11

## 2022-11-11 RX ORDER — FENTANYL CITRATE 50 UG/ML
INJECTION, SOLUTION INTRAMUSCULAR; INTRAVENOUS
Status: DISCONTINUED | OUTPATIENT
Start: 2022-11-11 | End: 2022-11-11

## 2022-11-11 RX ORDER — DEXAMETHASONE SODIUM PHOSPHATE 4 MG/ML
INJECTION, SOLUTION INTRA-ARTICULAR; INTRALESIONAL; INTRAMUSCULAR; INTRAVENOUS; SOFT TISSUE
Status: DISCONTINUED | OUTPATIENT
Start: 2022-11-11 | End: 2022-11-11

## 2022-11-11 RX ORDER — ONDANSETRON 2 MG/ML
4 INJECTION INTRAMUSCULAR; INTRAVENOUS DAILY PRN
Status: DISCONTINUED | OUTPATIENT
Start: 2022-11-11 | End: 2022-11-11 | Stop reason: HOSPADM

## 2022-11-11 RX ORDER — ONDANSETRON 2 MG/ML
INJECTION INTRAMUSCULAR; INTRAVENOUS
Status: DISCONTINUED | OUTPATIENT
Start: 2022-11-11 | End: 2022-11-11

## 2022-11-11 RX ORDER — MEPERIDINE HYDROCHLORIDE 25 MG/ML
25 INJECTION INTRAMUSCULAR; INTRAVENOUS; SUBCUTANEOUS ONCE AS NEEDED
Status: DISCONTINUED | OUTPATIENT
Start: 2022-11-11 | End: 2022-11-11 | Stop reason: HOSPADM

## 2022-11-11 RX ORDER — HYDROMORPHONE HYDROCHLORIDE 2 MG/ML
0.5 INJECTION, SOLUTION INTRAMUSCULAR; INTRAVENOUS; SUBCUTANEOUS EVERY 5 MIN PRN
Status: DISCONTINUED | OUTPATIENT
Start: 2022-11-11 | End: 2022-11-11 | Stop reason: HOSPADM

## 2022-11-11 RX ORDER — LIDOCAINE HYDROCHLORIDE 20 MG/ML
INJECTION, SOLUTION EPIDURAL; INFILTRATION; INTRACAUDAL; PERINEURAL
Status: DISCONTINUED | OUTPATIENT
Start: 2022-11-11 | End: 2022-11-11

## 2022-11-11 RX ORDER — MORPHINE SULFATE 10 MG/ML
4 INJECTION INTRAMUSCULAR; INTRAVENOUS; SUBCUTANEOUS EVERY 5 MIN PRN
Status: DISCONTINUED | OUTPATIENT
Start: 2022-11-11 | End: 2022-11-11 | Stop reason: HOSPADM

## 2022-11-11 RX ORDER — PROPOFOL 10 MG/ML
VIAL (ML) INTRAVENOUS
Status: DISCONTINUED | OUTPATIENT
Start: 2022-11-11 | End: 2022-11-11

## 2022-11-11 RX ORDER — IPRATROPIUM BROMIDE AND ALBUTEROL SULFATE 2.5; .5 MG/3ML; MG/3ML
3 SOLUTION RESPIRATORY (INHALATION) ONCE AS NEEDED
Status: DISCONTINUED | OUTPATIENT
Start: 2022-11-11 | End: 2022-11-11 | Stop reason: HOSPADM

## 2022-11-11 RX ORDER — DIPHENHYDRAMINE HYDROCHLORIDE 50 MG/ML
25 INJECTION INTRAMUSCULAR; INTRAVENOUS EVERY 6 HOURS PRN
Status: DISCONTINUED | OUTPATIENT
Start: 2022-11-11 | End: 2022-11-11 | Stop reason: HOSPADM

## 2022-11-11 RX ADMIN — DEXAMETHASONE SODIUM PHOSPHATE 8 MG: 4 INJECTION, SOLUTION INTRA-ARTICULAR; INTRALESIONAL; INTRAMUSCULAR; INTRAVENOUS; SOFT TISSUE at 08:11

## 2022-11-11 RX ADMIN — PROPOFOL 150 MG: 10 INJECTION, EMULSION INTRAVENOUS at 08:11

## 2022-11-11 RX ADMIN — ROCURONIUM BROMIDE 30 MG: 10 INJECTION, SOLUTION INTRAVENOUS at 08:11

## 2022-11-11 RX ADMIN — ONDANSETRON 8 MG: 2 INJECTION INTRAMUSCULAR; INTRAVENOUS at 08:11

## 2022-11-11 RX ADMIN — SODIUM CHLORIDE, POTASSIUM CHLORIDE, SODIUM LACTATE AND CALCIUM CHLORIDE: 600; 310; 30; 20 INJECTION, SOLUTION INTRAVENOUS at 08:11

## 2022-11-11 RX ADMIN — SUGAMMADEX 200 MG: 100 INJECTION, SOLUTION INTRAVENOUS at 08:11

## 2022-11-11 RX ADMIN — LIDOCAINE HYDROCHLORIDE 50 MG: 20 INJECTION, SOLUTION EPIDURAL; INFILTRATION; INTRACAUDAL; PERINEURAL at 08:11

## 2022-11-11 RX ADMIN — FENTANYL CITRATE 100 MCG: 50 INJECTION INTRAMUSCULAR; INTRAVENOUS at 08:11

## 2022-11-11 NOTE — TRANSFER OF CARE
"Anesthesia Transfer of Care Note    Patient: Sami Kerns    Procedure(s) Performed: Procedure(s) (LRB):  LARYNGOSCOPY, DIRECT (N/A)  EXCISION, LESION, VOCAL CORD POLYP (N/A)    Patient location: PACU    Anesthesia Type: general    Transport from OR: Transported from OR on room air with adequate spontaneous ventilation    Post pain: adequate analgesia    Post assessment: no apparent anesthetic complications and tolerated procedure well    Post vital signs: stable    Level of consciousness: responds to stimulation and awake    Nausea/Vomiting: no nausea/vomiting    Complications: none    Transfer of care protocol was followed      Last vitals:   Visit Vitals  /78 (BP Location: Left arm, Patient Position: Lying)   Pulse (!) 53   Temp 36.7 °C (98 °F) (Skin)   Resp 11   Ht 5' 9" (1.753 m)   Wt 90.3 kg (199 lb)   SpO2 98%   BMI 29.39 kg/m²     "

## 2022-11-11 NOTE — BRIEF OP NOTE
[facility] - Periop Services  Brief Operative Note    Surgery Date: 11/11/2022     Surgeon(s) and Role:     * Jesse Smalls MD - Primary    Assisting Surgeon: None    Pre-op Diagnosis:  Vocal cord polyps [J38.1]    Post-op Diagnosis:  Post-Op Diagnosis Codes:     * Vocal cord polyps [J38.1]    Procedure(s) (LRB):  LARYNGOSCOPY, DIRECT (N/A)  EXCISION, LESION, VOCAL CORD POLYP (N/A)    Anesthesia: General    Operative Findings: Polyps right VC    Estimated Blood Loss: 0         Specimens: to path  Specimen (24h ago, onward)       Start     Ordered    11/11/22 0846  Surgical Pathology  RELEASE UPON ORDERING         11/11/22 0846                      Discharge Note    OUTCOME: Patient tolerated treatment/procedure well without complication and is now ready for discharge.    DISPOSITION: Home or Self Care    FINAL DIAGNOSIS: Vocal cord polyp  FOLLOWUP: In clinic      DISCHARGE INSTRUCTIONS:  No discharge procedures on file.

## 2022-11-11 NOTE — OR NURSING
0901 PT TO PACU AWAKE AND ALERT. RESP EVEN AND UNLABORED. IV INFUSING TO R HAND. VSS. PT DENIES PAIN AT THIS TIME. SAFETY MEASURES IN PLACE.     0916 PT RESTING QUIETLY. NO DISTRESS NOTED. VSS. PT PEDRO LUIS PAIN.    0932 PT RESTING QUIETLY. NO DISTRESS NOTED. VSS. PT DENIES PAIN AT THIS TIME.     0935 OUT OF PACU    0937 PT TRANSFERRED TO ASC 9. RELEASED TO NANCY HUFF RN. PT AWAKE AND ALERT. RESP EVEN AND UNLABORED. IV INFUSING TO R HAND. VS: /57, HR 78, RR 12,S AO2 96% ON RA.SAFETY MEASURES IN PLACE. FAMILY AT BEDSIDE.

## 2022-11-11 NOTE — ANESTHESIA PREPROCEDURE EVALUATION
11/11/2022  Sami Kerns is a 72 y.o., male.      Pre-op Assessment    I have reviewed the Patient Summary Reports.     I have reviewed the Nursing Notes. I have reviewed the NPO Status.   I have reviewed the Medications.     Review of Systems  Anesthesia Hx:  No problems with previous Anesthesia  Denies Family Hx of Anesthesia complications.   Denies Personal Hx of Anesthesia complications.   Social:  Smoker, Social Alcohol Use    EENT/Dental:   Vocal cord polyp   Cardiovascular:   Exercise tolerance: poor Hypertension CAD  CABG/stent  CHF ECG has been reviewed.    Pulmonary:   COPD, moderate Shortness of breath    Renal/:   Chronic Renal Disease, CKD    Hepatic/GI:   Hiatal Hernia, GERD    Musculoskeletal:   Arthritis         Physical Exam  General: Well nourished, Cooperative and Alert    Airway:  Mallampati: II   Mouth Opening: Normal  TM Distance: Normal  Tongue: Normal  Neck ROM: Normal ROM    Dental:  Intact    Chest/Lungs:  Clear to auscultation, Normal Respiratory Rate    Heart:  Rate: Normal  Rhythm: Regular Rhythm        Chemistry        Component Value Date/Time     04/20/2022 0919    K 4.2 04/20/2022 0919     04/20/2022 0919    CO2 22 04/20/2022 0919    BUN 25 (H) 04/20/2022 0919    CREATININE 2.16 (H) 04/20/2022 0919     (H) 04/20/2022 0919        Component Value Date/Time    CALCIUM 9.2 04/20/2022 0919    ALKPHOS 109 04/20/2022 0919    AST 27 04/20/2022 0919    ALT 43 04/20/2022 0919    BILITOT 0.4 04/20/2022 0919    ESTGFRAFRICA 51 (L) 12/08/2021 1002    EGFRNONAA 32 (L) 04/20/2022 0919        Lab Results   Component Value Date    WBC 5.23 04/20/2022    RBC 4.65 04/20/2022    HGB 11.4 (L) 04/20/2022    MCV 75.1 (L) 04/20/2022    MCH 24.5 (L) 04/20/2022    MCHC 32.7 04/20/2022    RDW 17.7 (H) 04/20/2022     04/20/2022    MPV 10.3 04/20/2022    LYMPH 33.3  04/20/2022    LYMPH 1.74 04/20/2022    MONO 10.3 (H) 04/20/2022    EOS 0.16 04/20/2022    BASO 0.03 04/20/2022     No results found for this or any previous visit.      Anesthesia Plan  Type of Anesthesia, risks & benefits discussed:    Anesthesia Type: Gen ETT  Intra-op Monitoring Plan: Standard ASA Monitors  Post Op Pain Control Plan: multimodal analgesia  Induction:  IV  Airway Plan: Direct, Post-Induction  Informed Consent: Informed consent signed with the Patient and all parties understand the risks and agree with anesthesia plan.  All questions answered.   ASA Score: 3  Day of Surgery Review of History & Physical: H&P Update referred to the surgeon/provider.I have interviewed and examined the patient. I have reviewed the patient's H&P dated: There are no significant changes.     Ready For Surgery From Anesthesia Perspective.     .

## 2022-11-11 NOTE — ANESTHESIA POSTPROCEDURE EVALUATION
Anesthesia Post Evaluation    Patient: Sami Kerns    Procedure(s) Performed: Procedure(s) (LRB):  LARYNGOSCOPY, DIRECT (N/A)  EXCISION, LESION, VOCAL CORD POLYP (N/A)    Final Anesthesia Type: general      Patient location during evaluation: PACU  Patient participation: Yes- Able to Participate  Level of consciousness: awake and sedated  Post-procedure vital signs: reviewed and stable  Pain management: adequate  Airway patency: patent    PONV status at discharge: No PONV  Anesthetic complications: no      Cardiovascular status: blood pressure returned to baseline  Respiratory status: unassisted  Hydration status: euvolemic  Follow-up not needed.          Vitals Value Taken Time   /66 11/11/22 1016   Temp 36.7 °C (98 °F) 11/11/22 0908   Pulse 48 11/11/22 1021   Resp 18 11/11/22 0937   SpO2 96 % 11/11/22 1021   Vitals shown include unvalidated device data.      Event Time   Out of Recovery 09:35:00         Pain/Nicholas Score: Nicholas Score: 10 (11/11/2022 10:16 AM)

## 2022-11-16 ENCOUNTER — OFFICE VISIT (OUTPATIENT)
Dept: FAMILY MEDICINE | Facility: CLINIC | Age: 72
End: 2022-11-16
Payer: MEDICARE

## 2022-11-16 VITALS
RESPIRATION RATE: 18 BRPM | TEMPERATURE: 97 F | WEIGHT: 201.19 LBS | BODY MASS INDEX: 29.8 KG/M2 | DIASTOLIC BLOOD PRESSURE: 70 MMHG | SYSTOLIC BLOOD PRESSURE: 138 MMHG | HEART RATE: 60 BPM | OXYGEN SATURATION: 98 % | HEIGHT: 69 IN

## 2022-11-16 DIAGNOSIS — I10 PRIMARY HYPERTENSION: Primary | ICD-10-CM

## 2022-11-16 PROCEDURE — 1160F PR REVIEW ALL MEDS BY PRESCRIBER/CLIN PHARMACIST DOCUMENTED: ICD-10-PCS | Mod: ,,, | Performed by: INTERNAL MEDICINE

## 2022-11-16 PROCEDURE — 3288F FALL RISK ASSESSMENT DOCD: CPT | Mod: ,,, | Performed by: INTERNAL MEDICINE

## 2022-11-16 PROCEDURE — 3288F PR FALLS RISK ASSESSMENT DOCUMENTED: ICD-10-PCS | Mod: ,,, | Performed by: INTERNAL MEDICINE

## 2022-11-16 PROCEDURE — 1159F MED LIST DOCD IN RCRD: CPT | Mod: ,,, | Performed by: INTERNAL MEDICINE

## 2022-11-16 PROCEDURE — 1101F PR PT FALLS ASSESS DOC 0-1 FALLS W/OUT INJ PAST YR: ICD-10-PCS | Mod: ,,, | Performed by: INTERNAL MEDICINE

## 2022-11-16 PROCEDURE — 1160F RVW MEDS BY RX/DR IN RCRD: CPT | Mod: ,,, | Performed by: INTERNAL MEDICINE

## 2022-11-16 PROCEDURE — 99214 OFFICE O/P EST MOD 30 MIN: CPT | Mod: ,,, | Performed by: INTERNAL MEDICINE

## 2022-11-16 PROCEDURE — 3078F PR MOST RECENT DIASTOLIC BLOOD PRESSURE < 80 MM HG: ICD-10-PCS | Mod: ,,, | Performed by: INTERNAL MEDICINE

## 2022-11-16 PROCEDURE — 99214 PR OFFICE/OUTPT VISIT, EST, LEVL IV, 30-39 MIN: ICD-10-PCS | Mod: ,,, | Performed by: INTERNAL MEDICINE

## 2022-11-16 PROCEDURE — 3075F SYST BP GE 130 - 139MM HG: CPT | Mod: ,,, | Performed by: INTERNAL MEDICINE

## 2022-11-16 PROCEDURE — 1126F PR PAIN SEVERITY QUANTIFIED, NO PAIN PRESENT: ICD-10-PCS | Mod: ,,, | Performed by: INTERNAL MEDICINE

## 2022-11-16 PROCEDURE — 1101F PT FALLS ASSESS-DOCD LE1/YR: CPT | Mod: ,,, | Performed by: INTERNAL MEDICINE

## 2022-11-16 PROCEDURE — 3008F BODY MASS INDEX DOCD: CPT | Mod: ,,, | Performed by: INTERNAL MEDICINE

## 2022-11-16 PROCEDURE — 3008F PR BODY MASS INDEX (BMI) DOCUMENTED: ICD-10-PCS | Mod: ,,, | Performed by: INTERNAL MEDICINE

## 2022-11-16 PROCEDURE — 1126F AMNT PAIN NOTED NONE PRSNT: CPT | Mod: ,,, | Performed by: INTERNAL MEDICINE

## 2022-11-16 PROCEDURE — 4010F ACE/ARB THERAPY RXD/TAKEN: CPT | Mod: ,,, | Performed by: INTERNAL MEDICINE

## 2022-11-16 PROCEDURE — 3075F PR MOST RECENT SYSTOLIC BLOOD PRESS GE 130-139MM HG: ICD-10-PCS | Mod: ,,, | Performed by: INTERNAL MEDICINE

## 2022-11-16 PROCEDURE — 3078F DIAST BP <80 MM HG: CPT | Mod: ,,, | Performed by: INTERNAL MEDICINE

## 2022-11-16 PROCEDURE — 1159F PR MEDICATION LIST DOCUMENTED IN MEDICAL RECORD: ICD-10-PCS | Mod: ,,, | Performed by: INTERNAL MEDICINE

## 2022-11-16 PROCEDURE — 4010F PR ACE/ARB THEARPY RXD/TAKEN: ICD-10-PCS | Mod: ,,, | Performed by: INTERNAL MEDICINE

## 2022-11-16 NOTE — PROGRESS NOTES
Subjective:       Patient ID: Sami Kerns is a 72 y.o. male.    Chief Complaint: Medication Refill    Patient is here today for a follow up evaluation. Patient blood pressure is stable today on intake, 138/70. Patient states he has no new complaints. Patient is requesting medication refills. Will refill today. Will order lab work in 3 months. Will follow with patient in 3 months.     Current Medications:    Current Outpatient Medications:     albuterol (VENTOLIN HFA) 90 mcg/actuation inhaler, Inhale 2 puffs into the lungs every 6 (six) hours as needed for Wheezing. Rescue, Disp: 18 g, Rfl: 5    amiodarone (PACERONE) 200 MG Tab, Take by mouth once daily., Disp: , Rfl:     aspirin (ECOTRIN) 81 MG EC tablet, Take 81 mg by mouth once daily., Disp: , Rfl:     atorvastatin (LIPITOR) 80 MG tablet, Take 1 tablet by mouth once daily., Disp: , Rfl:     cilostazoL (PLETAL) 100 MG Tab, Take 100 mg by mouth 2 (two) times daily., Disp: , Rfl:     clopidogreL (PLAVIX) 75 mg tablet, Take 75 mg by mouth once daily., Disp: , Rfl:     ferrous sulfate (FEOSOL) 325 mg (65 mg iron) Tab tablet, Take 325 mg by mouth 3 (three) times daily., Disp: , Rfl:     furosemide (LASIX) 40 MG tablet, Take 40 mg by mouth once daily., Disp: , Rfl:     hydrALAZINE (APRESOLINE) 50 MG tablet, Take 50 mg by mouth 3 (three) times daily., Disp: , Rfl:     losartan (COZAAR) 100 MG tablet, Take 100 mg by mouth once daily., Disp: , Rfl:     metoprolol succinate (TOPROL-XL) 50 MG 24 hr tablet, Take 1 tablet by mouth once daily., Disp: , Rfl:     propranoloL (INDERAL) 20 MG tablet, TAKE ONE TABLET BY MOUTH TWICE DAILY, Disp: 60 tablet, Rfl: 1    tamsulosin (FLOMAX) 0.4 mg Cap, Take 1 capsule (0.4 mg total) by mouth once daily., Disp: 90 capsule, Rfl: 3    ascorbic acid, vitamin C, (VITAMIN C) 1000 MG tablet, Take 1,000 mg by mouth once daily., Disp: , Rfl:     ezetimibe (ZETIA) 10 mg tablet, Take 10 mg by mouth once daily., Disp: , Rfl:     naproxen (NAPROSYN)  500 MG tablet, Take 1 tablet (500 mg total) by mouth 2 (two) times daily as needed (pain). (Patient not taking: Reported on 10/27/2022), Disp: 20 tablet, Rfl: 1    Last Labs:     Admission on 11/11/2022, Discharged on 11/11/2022   Component Date Value    Case Report 11/11/2022                      Value:Surgical Pathology                                Case: [id number]                                   Authorizing Provider:  Jesse Smalls MD        Collected:           11/11/2022 08:46 AM          Ordering Location:     [facility] -     Received:            11/11/2022 09:34 AM                                 Periop Services                                                              Pathologist:           James Keith MD                                                       Specimen:    Larynx, larynx biopsy                                                                      Final Diagnosis 11/11/2022                      Value:This result contains rich text formatting which cannot be displayed here.    Gross Description 11/11/2022                      Value:This result contains rich text formatting which cannot be displayed here.    Microscopic Description 11/11/2022                      Value:This result contains rich text formatting which cannot be displayed here.    Laboratory Notes 11/11/2022                      Value:This result contains rich text formatting which cannot be displayed here.       Last Imaging:  EGD w Dilation  Narrative: Procedure Date  5/16/22    Impression  Overall   Impression:  Mucosa of the esophagus is grossly normal. Distal   esophageal biopsies were obtained and the  esophagus was dilated with a   48F Cronin. A 2cm hiatus hernia is noted. Mild erythema was present in   the antrum and biopsies were obtained. Mucosa of the duodenum is normal.    Recommendation  Await pathology results; avoid nsaids; repeat dilation prn.    Indication  Hoarseness, blood in stool,  esophageal dysphagia.    Providers  Attending: Johnathon Zambrano MD  Fellow:    Other Staff: Neelima Arevalo, MELISSA, Sridhar Barrera RN, Hector Gil CRNA    Medications  Moderate sedation administered by anesthesia staff - See anesthesia   record.    Preprocedure  A history and physical has been performed, and patient medication   allergies have been reviewed. The patient's tolerance of previous   anesthesia has been reviewed. The risks and benefits of the procedure and   the sedation options and risks were discussed with the patient. All   questions were answered and informed consent obtained.    ASA Score: ASA 2 - Patient with mild systemic disease with no functional   limitations  Mallampati Airway Score: II (hard and soft palate, upper portion of   tonsils anduvula visible)    Details of the Procedure  The patient underwent monitored anesthesia care, which was administered by   an anesthesia professional. The patient's blood pressure, heart rate,   level of consciousness, oxygen, respirations, ECG and ETCO2 were monitored   throughout the procedure. The scope was introduced through the mouth and   advanced to the third part of the duodenum. Retroflexion was performed in   the cardia. The patient's estimated blood loss was minimal (<5 mL). The   procedure was not difficult. The patient tolerated the procedure well.   There were no apparent complications.     Scope: Gastroscope  Scope Serial: [id number]    Events  Procedure Events   Event Event Time     Procedure Events   Event Event Time   SCOPE IN 5/16/2022 11:44 AM   SCOPE OUT 5/16/2022 11:48 AM     Findings  2 cm hiatal hernia  Dilated in the esophagus with Cronin dilator         Review of Systems   All other systems reviewed and are negative.      Objective:      Physical Exam  Constitutional:       Appearance: Normal appearance. He is normal weight.   Cardiovascular:      Rate and Rhythm: Normal rate and regular rhythm.      Pulses: Normal pulses.       Heart sounds: Normal heart sounds.   Pulmonary:      Effort: Pulmonary effort is normal.      Breath sounds: Normal breath sounds.   Abdominal:      General: Abdomen is flat. Bowel sounds are normal.      Palpations: Abdomen is soft.   Musculoskeletal:         General: Normal range of motion.      Cervical back: Normal range of motion and neck supple.   Skin:     General: Skin is warm and dry.   Neurological:      General: No focal deficit present.      Mental Status: He is alert and oriented to person, place, and time. Mental status is at baseline.       Assessment:       1. Primary hypertension  Basic Metabolic Panel           Plan:         Sami was seen today for medication refill.    Diagnoses and all orders for this visit:    Primary hypertension  -     Basic Metabolic Panel; Future    Other orders  The following orders have not been finalized:  -     propranoloL (INDERAL) 20 MG tablet

## 2022-11-16 NOTE — PATIENT INSTRUCTIONS
Sami was seen today for medication refill.    Diagnoses and all orders for this visit:    Primary hypertension  -     Basic Metabolic Panel; Future    Other orders  The following orders have not been finalized:  -     propranoloL (INDERAL) 20 MG tablet

## 2022-11-18 RX ORDER — PROPRANOLOL HYDROCHLORIDE 20 MG/1
20 TABLET ORAL 2 TIMES DAILY
Qty: 60 TABLET | Refills: 1 | Status: SHIPPED | OUTPATIENT
Start: 2022-11-18 | End: 2023-02-04

## 2022-11-21 ENCOUNTER — OFFICE VISIT (OUTPATIENT)
Dept: OTOLARYNGOLOGY | Facility: CLINIC | Age: 72
End: 2022-11-21
Payer: MEDICARE

## 2022-11-21 VITALS — WEIGHT: 201 LBS | BODY MASS INDEX: 29.77 KG/M2 | HEIGHT: 69 IN

## 2022-11-21 DIAGNOSIS — J38.1 VOCAL CORD POLYPS: Primary | ICD-10-CM

## 2022-11-21 PROCEDURE — 99213 PR OFFICE/OUTPT VISIT, EST, LEVL III, 20-29 MIN: ICD-10-PCS | Mod: S$PBB,,, | Performed by: OTOLARYNGOLOGY

## 2022-11-21 PROCEDURE — 1159F PR MEDICATION LIST DOCUMENTED IN MEDICAL RECORD: ICD-10-PCS | Mod: CPTII,,, | Performed by: OTOLARYNGOLOGY

## 2022-11-21 PROCEDURE — 4010F PR ACE/ARB THEARPY RXD/TAKEN: ICD-10-PCS | Mod: CPTII,,, | Performed by: OTOLARYNGOLOGY

## 2022-11-21 PROCEDURE — 1159F MED LIST DOCD IN RCRD: CPT | Mod: CPTII,,, | Performed by: OTOLARYNGOLOGY

## 2022-11-21 PROCEDURE — 99213 OFFICE O/P EST LOW 20 MIN: CPT | Mod: S$PBB,,, | Performed by: OTOLARYNGOLOGY

## 2022-11-21 PROCEDURE — 4010F ACE/ARB THERAPY RXD/TAKEN: CPT | Mod: CPTII,,, | Performed by: OTOLARYNGOLOGY

## 2022-11-21 PROCEDURE — 99214 OFFICE O/P EST MOD 30 MIN: CPT | Mod: PBBFAC | Performed by: OTOLARYNGOLOGY

## 2022-11-21 PROCEDURE — 3008F PR BODY MASS INDEX (BMI) DOCUMENTED: ICD-10-PCS | Mod: CPTII,,, | Performed by: OTOLARYNGOLOGY

## 2022-11-21 PROCEDURE — 3008F BODY MASS INDEX DOCD: CPT | Mod: CPTII,,, | Performed by: OTOLARYNGOLOGY

## 2022-11-21 PROCEDURE — 1160F PR REVIEW ALL MEDS BY PRESCRIBER/CLIN PHARMACIST DOCUMENTED: ICD-10-PCS | Mod: CPTII,,, | Performed by: OTOLARYNGOLOGY

## 2022-11-21 PROCEDURE — 1160F RVW MEDS BY RX/DR IN RCRD: CPT | Mod: CPTII,,, | Performed by: OTOLARYNGOLOGY

## 2022-11-21 NOTE — PROGRESS NOTES
Subjective:       Patient ID: Sami Kerns is a 72 y.o. male.    Chief Complaint: Follow-up (10 day post-op direct laryngoscopy with vocal cord polyp excision. Pt states his voice is back to his normal.)    HPI  Review of Systems    Objective:      Physical Exam  Path benign voice better   Assessment:       1. Vocal cord polyps        Plan:       F/u 3 months

## 2023-01-19 ENCOUNTER — TELEPHONE (OUTPATIENT)
Dept: GASTROENTEROLOGY | Facility: CLINIC | Age: 73
End: 2023-01-19
Payer: MEDICARE

## 2023-01-19 ENCOUNTER — OFFICE VISIT (OUTPATIENT)
Dept: GASTROENTEROLOGY | Facility: CLINIC | Age: 73
End: 2023-01-19
Payer: MEDICARE

## 2023-01-19 VITALS
DIASTOLIC BLOOD PRESSURE: 50 MMHG | HEART RATE: 61 BPM | OXYGEN SATURATION: 98 % | BODY MASS INDEX: 29.98 KG/M2 | SYSTOLIC BLOOD PRESSURE: 128 MMHG | WEIGHT: 203 LBS

## 2023-01-19 DIAGNOSIS — D50.9 IRON DEFICIENCY ANEMIA, UNSPECIFIED IRON DEFICIENCY ANEMIA TYPE: Primary | ICD-10-CM

## 2023-01-19 DIAGNOSIS — K44.9 HH (HIATUS HERNIA): ICD-10-CM

## 2023-01-19 DIAGNOSIS — R13.19 ESOPHAGEAL DYSPHAGIA: ICD-10-CM

## 2023-01-19 PROCEDURE — 1160F PR REVIEW ALL MEDS BY PRESCRIBER/CLIN PHARMACIST DOCUMENTED: ICD-10-PCS | Mod: CPTII,,, | Performed by: NURSE PRACTITIONER

## 2023-01-19 PROCEDURE — 3074F PR MOST RECENT SYSTOLIC BLOOD PRESSURE < 130 MM HG: ICD-10-PCS | Mod: CPTII,,, | Performed by: NURSE PRACTITIONER

## 2023-01-19 PROCEDURE — 1101F PR PT FALLS ASSESS DOC 0-1 FALLS W/OUT INJ PAST YR: ICD-10-PCS | Mod: CPTII,,, | Performed by: NURSE PRACTITIONER

## 2023-01-19 PROCEDURE — 3008F PR BODY MASS INDEX (BMI) DOCUMENTED: ICD-10-PCS | Mod: CPTII,,, | Performed by: NURSE PRACTITIONER

## 2023-01-19 PROCEDURE — 1101F PT FALLS ASSESS-DOCD LE1/YR: CPT | Mod: CPTII,,, | Performed by: NURSE PRACTITIONER

## 2023-01-19 PROCEDURE — 99214 PR OFFICE/OUTPT VISIT, EST, LEVL IV, 30-39 MIN: ICD-10-PCS | Mod: S$PBB,,, | Performed by: NURSE PRACTITIONER

## 2023-01-19 PROCEDURE — 3288F PR FALLS RISK ASSESSMENT DOCUMENTED: ICD-10-PCS | Mod: CPTII,,, | Performed by: NURSE PRACTITIONER

## 2023-01-19 PROCEDURE — 3288F FALL RISK ASSESSMENT DOCD: CPT | Mod: CPTII,,, | Performed by: NURSE PRACTITIONER

## 2023-01-19 PROCEDURE — 1159F MED LIST DOCD IN RCRD: CPT | Mod: CPTII,,, | Performed by: NURSE PRACTITIONER

## 2023-01-19 PROCEDURE — 3008F BODY MASS INDEX DOCD: CPT | Mod: CPTII,,, | Performed by: NURSE PRACTITIONER

## 2023-01-19 PROCEDURE — 3078F PR MOST RECENT DIASTOLIC BLOOD PRESSURE < 80 MM HG: ICD-10-PCS | Mod: CPTII,,, | Performed by: NURSE PRACTITIONER

## 2023-01-19 PROCEDURE — 99214 OFFICE O/P EST MOD 30 MIN: CPT | Mod: S$PBB,,, | Performed by: NURSE PRACTITIONER

## 2023-01-19 PROCEDURE — 1160F RVW MEDS BY RX/DR IN RCRD: CPT | Mod: CPTII,,, | Performed by: NURSE PRACTITIONER

## 2023-01-19 PROCEDURE — 99214 OFFICE O/P EST MOD 30 MIN: CPT | Mod: PBBFAC | Performed by: NURSE PRACTITIONER

## 2023-01-19 PROCEDURE — 1159F PR MEDICATION LIST DOCUMENTED IN MEDICAL RECORD: ICD-10-PCS | Mod: CPTII,,, | Performed by: NURSE PRACTITIONER

## 2023-01-19 PROCEDURE — 3078F DIAST BP <80 MM HG: CPT | Mod: CPTII,,, | Performed by: NURSE PRACTITIONER

## 2023-01-19 PROCEDURE — 3074F SYST BP LT 130 MM HG: CPT | Mod: CPTII,,, | Performed by: NURSE PRACTITIONER

## 2023-01-19 NOTE — PROGRESS NOTES
Sami Kerns is a 73 y.o. male here for No chief complaint on file.        PCP: Michael Weems  Referring Provider: No referring provider defined for this encounter.     HPI:  Presents for follow up due to [facility]. He is taking oral iron.Denies hematochezia or melena. 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 EGD/Dil 5/16/22, 2 cm hiatal hernia.Denies dysphagia. Last colonoscopy 11/29/21, recommendation to repeat in 3 years. Appetite is good. No weight loss. No report of back pain. Chronic hoarseness due to previous polyp on vocal cord.        ROS:  Review of Systems   Constitutional:  Negative for activity change, fatigue, fever and unexpected weight change.   HENT:  Positive for voice change. Negative for dental problem, sore throat and trouble swallowing.    Respiratory:  Negative for cough.    Cardiovascular:  Negative for chest pain.   Gastrointestinal:  Negative for abdominal distention, abdominal pain, blood in stool, change in bowel habit, constipation, diarrhea, nausea, vomiting, reflux and change in bowel habit.   Musculoskeletal:  Negative for gait problem.   Integumentary:  Negative for color change.   Neurological:  Negative for light-headedness.   Psychiatric/Behavioral:  Negative for sleep disturbance. The patient is not nervous/anxious.         PMHX:  has a past medical history of Adenomatous polyp of ascending colon (11/29/2021), Adenomatous polyp of descending colon (11/29/2021), Adenomatous polyp of transverse colon (11/29/2021), Anticoagulant long-term use, Blood in stool (11/29/2021), CAD (coronary artery disease), CHF (congestive heart failure), COPD (chronic obstructive pulmonary disease), GERD (gastroesophageal reflux disease), Gout, HH (hiatus hernia) (05/16/2022), HTN (hypertension), Hyperlipidemia, Iron deficiency anemia due to chronic blood loss (11/29/2021), Orchitis (01/20/2022), Osteoarthritis, Polyp of splenic flexure of colon (11/29/2021), Screening for colon  cancer (11/29/2021), Urinary incontinence (01/20/2022), and Vocal cord polyps.    PSHX:  has a past surgical history that includes Coronary Artery Bypass Graft (CABG); Tonsillectomy (N/A); Direct laryngoscopy (Bilateral, 05/10/2022); Vocal fold lesion excision (Bilateral, 05/10/2022); Direct laryngoscopy (N/A, 11/11/2022); and Vocal fold lesion excision (N/A, 11/11/2022).    PFHX: family history includes Hypertension in his mother.    PSlHX:  reports that he has been smoking cigarettes. He has a 2.80 pack-year smoking history. He has never used smokeless tobacco. He reports current alcohol use. He reports that he does not use drugs.        Review of patient's allergies indicates:  No Known Allergies    Medication List with Changes/Refills   Current Medications    ALBUTEROL (VENTOLIN HFA) 90 MCG/ACTUATION INHALER    Inhale 2 puffs into the lungs every 6 (six) hours as needed for Wheezing. Rescue    AMIODARONE (PACERONE) 200 MG TAB    Take by mouth once daily.    ASCORBIC ACID, VITAMIN C, (VITAMIN C) 1000 MG TABLET    Take 1,000 mg by mouth once daily.    ASPIRIN (ECOTRIN) 81 MG EC TABLET    Take 81 mg by mouth once daily.    ATORVASTATIN (LIPITOR) 80 MG TABLET    Take 1 tablet by mouth once daily.    CILOSTAZOL (PLETAL) 100 MG TAB    Take 100 mg by mouth 2 (two) times daily.    CLOPIDOGREL (PLAVIX) 75 MG TABLET    Take 75 mg by mouth once daily.    EZETIMIBE (ZETIA) 10 MG TABLET    Take 10 mg by mouth once daily.    FERROUS SULFATE (FEOSOL) 325 MG (65 MG IRON) TAB TABLET    Take 325 mg by mouth 3 (three) times daily.    FUROSEMIDE (LASIX) 40 MG TABLET    Take 40 mg by mouth once daily.    HYDRALAZINE (APRESOLINE) 50 MG TABLET    Take 50 mg by mouth 3 (three) times daily.    LOSARTAN (COZAAR) 50 MG TABLET    Take 100 mg by mouth once daily.    METOPROLOL SUCCINATE (TOPROL-XL) 50 MG 24 HR TABLET    Take 1 tablet by mouth once daily.    NAPROXEN (NAPROSYN) 500 MG TABLET    TAKE ONE TABLET BY MOUTH TWICE DAILY AS NEEDED     PROPRANOLOL (INDERAL) 20 MG TABLET    Take 1 tablet (20 mg total) by mouth 2 (two) times daily.    TAMSULOSIN (FLOMAX) 0.4 MG CAP    Take 1 capsule (0.4 mg total) by mouth once daily.        Objective Findings:  Vital Signs:  BP (!) 128/50   Pulse 61   Wt 92.1 kg (203 lb)   SpO2 98%   BMI 29.98 kg/m²  Body mass index is 29.98 kg/m².    Physical Exam:  Physical Exam  Vitals and nursing note reviewed.   Constitutional:       General: He is not in acute distress.     Appearance: Normal appearance.   HENT:      Mouth/Throat:      Mouth: Mucous membranes are moist.   Cardiovascular:      Rate and Rhythm: Normal rate.   Pulmonary:      Effort: Pulmonary effort is normal.      Breath sounds: No wheezing, rhonchi or rales.   Abdominal:      General: Bowel sounds are normal. There is no distension.      Palpations: Abdomen is soft. There is no mass.      Tenderness: There is no abdominal tenderness.   Skin:     General: Skin is warm and dry.      Coloration: Skin is not jaundiced or pale.   Neurological:      Mental Status: He is alert and oriented to person, place, and time.   Psychiatric:         Mood and Affect: Mood normal.        Labs:  Lab Results   Component Value Date    WBC 5.23 04/20/2022    HGB 11.4 (L) 04/20/2022    HCT 34.9 (L) 04/20/2022    MCV 75.1 (L) 04/20/2022    RDW 17.7 (H) 04/20/2022     04/20/2022    LYMPH 33.3 04/20/2022    LYMPH 1.74 04/20/2022    MONO 10.3 (H) 04/20/2022    EOS 0.16 04/20/2022    BASO 0.03 04/20/2022     Lab Results   Component Value Date     04/20/2022    K 4.2 04/20/2022     04/20/2022    CO2 22 04/20/2022     (H) 04/20/2022    BUN 25 (H) 04/20/2022    CREATININE 2.16 (H) 04/20/2022    CALCIUM 9.2 04/20/2022    PROT 8.4 (H) 04/20/2022    PROT 8.1 04/20/2022    ALBUMIN 3.6 04/20/2022    BILITOT 0.4 04/20/2022    ALKPHOS 109 04/20/2022    AST 27 04/20/2022    ALT 43 04/20/2022         Imaging: No results found.      Assessment:  Sami Kerns is a  73 y.o. male here with:  1. Iron deficiency anemia, unspecified iron deficiency anemia type    2. Esophageal dysphagia    3. HH (hiatus hernia)          Recommendations:  1. Iron studies, CBC, CMP  2. Continue oral iron  3. Keep follow up with Dr. Arenas  4. Will be due for colonoscopy November 2024    Follow up in about 6 months (around 7/19/2023).      Order summary:  Orders Placed This Encounter    Iron and TIBC    Ferritin    CBC Auto Differential    Comprehensive Metabolic Panel       Thank you for allowing me to participate in the care of Sami Kerns.      Pauline Schaeffer, JAYNEC

## 2023-01-19 NOTE — TELEPHONE ENCOUNTER
Results called to patient. Would like nephrology referral.            ----- Message from SARA White sent at 1/19/2023 12:51 PM CST -----  Iron studies are normal. Minimal anemia. Creat has increased. Is he seeing nephrology. If not we need to refer

## 2023-02-15 ENCOUNTER — OFFICE VISIT (OUTPATIENT)
Dept: FAMILY MEDICINE | Facility: CLINIC | Age: 73
End: 2023-02-15
Payer: MEDICARE

## 2023-02-15 VITALS
OXYGEN SATURATION: 97 % | BODY MASS INDEX: 29.07 KG/M2 | HEART RATE: 64 BPM | DIASTOLIC BLOOD PRESSURE: 73 MMHG | SYSTOLIC BLOOD PRESSURE: 159 MMHG | RESPIRATION RATE: 18 BRPM | TEMPERATURE: 97 F | WEIGHT: 207.63 LBS | HEIGHT: 71 IN

## 2023-02-15 DIAGNOSIS — N13.8 BPH WITH OBSTRUCTION/LOWER URINARY TRACT SYMPTOMS: Primary | ICD-10-CM

## 2023-02-15 DIAGNOSIS — N40.1 BPH WITH OBSTRUCTION/LOWER URINARY TRACT SYMPTOMS: Primary | ICD-10-CM

## 2023-02-15 DIAGNOSIS — I10 PRIMARY HYPERTENSION: ICD-10-CM

## 2023-02-15 DIAGNOSIS — I25.10 CORONARY ARTERY DISEASE, UNSPECIFIED VESSEL OR LESION TYPE, UNSPECIFIED WHETHER ANGINA PRESENT, UNSPECIFIED WHETHER NATIVE OR TRANSPLANTED HEART: ICD-10-CM

## 2023-02-15 PROCEDURE — 3008F BODY MASS INDEX DOCD: CPT | Mod: ,,, | Performed by: INTERNAL MEDICINE

## 2023-02-15 PROCEDURE — 1101F PR PT FALLS ASSESS DOC 0-1 FALLS W/OUT INJ PAST YR: ICD-10-PCS | Mod: ,,, | Performed by: INTERNAL MEDICINE

## 2023-02-15 PROCEDURE — 3078F PR MOST RECENT DIASTOLIC BLOOD PRESSURE < 80 MM HG: ICD-10-PCS | Mod: ,,, | Performed by: INTERNAL MEDICINE

## 2023-02-15 PROCEDURE — 1126F AMNT PAIN NOTED NONE PRSNT: CPT | Mod: ,,, | Performed by: INTERNAL MEDICINE

## 2023-02-15 PROCEDURE — 1160F PR REVIEW ALL MEDS BY PRESCRIBER/CLIN PHARMACIST DOCUMENTED: ICD-10-PCS | Mod: ,,, | Performed by: INTERNAL MEDICINE

## 2023-02-15 PROCEDURE — 99213 PR OFFICE/OUTPT VISIT, EST, LEVL III, 20-29 MIN: ICD-10-PCS | Mod: ,,, | Performed by: INTERNAL MEDICINE

## 2023-02-15 PROCEDURE — 3288F PR FALLS RISK ASSESSMENT DOCUMENTED: ICD-10-PCS | Mod: ,,, | Performed by: INTERNAL MEDICINE

## 2023-02-15 PROCEDURE — 1159F PR MEDICATION LIST DOCUMENTED IN MEDICAL RECORD: ICD-10-PCS | Mod: ,,, | Performed by: INTERNAL MEDICINE

## 2023-02-15 PROCEDURE — 3008F PR BODY MASS INDEX (BMI) DOCUMENTED: ICD-10-PCS | Mod: ,,, | Performed by: INTERNAL MEDICINE

## 2023-02-15 PROCEDURE — 1160F RVW MEDS BY RX/DR IN RCRD: CPT | Mod: ,,, | Performed by: INTERNAL MEDICINE

## 2023-02-15 PROCEDURE — 3077F SYST BP >= 140 MM HG: CPT | Mod: ,,, | Performed by: INTERNAL MEDICINE

## 2023-02-15 PROCEDURE — 3288F FALL RISK ASSESSMENT DOCD: CPT | Mod: ,,, | Performed by: INTERNAL MEDICINE

## 2023-02-15 PROCEDURE — 1159F MED LIST DOCD IN RCRD: CPT | Mod: ,,, | Performed by: INTERNAL MEDICINE

## 2023-02-15 PROCEDURE — 1101F PT FALLS ASSESS-DOCD LE1/YR: CPT | Mod: ,,, | Performed by: INTERNAL MEDICINE

## 2023-02-15 PROCEDURE — 3077F PR MOST RECENT SYSTOLIC BLOOD PRESSURE >= 140 MM HG: ICD-10-PCS | Mod: ,,, | Performed by: INTERNAL MEDICINE

## 2023-02-15 PROCEDURE — 1126F PR PAIN SEVERITY QUANTIFIED, NO PAIN PRESENT: ICD-10-PCS | Mod: ,,, | Performed by: INTERNAL MEDICINE

## 2023-02-15 PROCEDURE — 3078F DIAST BP <80 MM HG: CPT | Mod: ,,, | Performed by: INTERNAL MEDICINE

## 2023-02-15 PROCEDURE — 99213 OFFICE O/P EST LOW 20 MIN: CPT | Mod: ,,, | Performed by: INTERNAL MEDICINE

## 2023-02-15 RX ORDER — HYDRALAZINE HYDROCHLORIDE 50 MG/1
50 TABLET, FILM COATED ORAL 3 TIMES DAILY
Qty: 90 TABLET | Refills: 1 | Status: SHIPPED | OUTPATIENT
Start: 2023-02-15

## 2023-02-15 RX ORDER — FERROUS SULFATE 325(65) MG
325 TABLET ORAL 3 TIMES DAILY
Qty: 90 TABLET | Refills: 1 | Status: SHIPPED | OUTPATIENT
Start: 2023-02-15

## 2023-02-15 RX ORDER — TAMSULOSIN HYDROCHLORIDE 0.4 MG/1
0.4 CAPSULE ORAL DAILY
Qty: 90 CAPSULE | Refills: 3 | Status: SHIPPED | OUTPATIENT
Start: 2023-02-15 | End: 2023-03-07 | Stop reason: SDUPTHER

## 2023-02-15 RX ORDER — LOSARTAN POTASSIUM 50 MG/1
100 TABLET ORAL DAILY
Qty: 90 TABLET | Refills: 1 | Status: SHIPPED | OUTPATIENT
Start: 2023-02-15

## 2023-02-15 RX ORDER — PROPRANOLOL HYDROCHLORIDE 20 MG/1
20 TABLET ORAL 2 TIMES DAILY
Qty: 90 TABLET | Refills: 1 | Status: SHIPPED | OUTPATIENT
Start: 2023-02-15 | End: 2023-11-09

## 2023-02-15 RX ORDER — CLOPIDOGREL BISULFATE 75 MG/1
75 TABLET ORAL DAILY
Qty: 90 TABLET | Refills: 1 | Status: SHIPPED | OUTPATIENT
Start: 2023-02-15

## 2023-02-15 NOTE — PROGRESS NOTES
Subjective:       Patient ID: Sami Kerns is a 73 y.o. male.    Chief Complaint: Follow-up    Patient is here today for a follow up evaluation. Patient blood pressure is slightly increased today on intake, 159/73. Patient states he consumed beer the previous night. Patient has no new complaints. Patient is requesting medication refills. Will refill today. Will follow with patient in 4 months.   .    Current Medications:    Current Outpatient Medications:     albuterol (VENTOLIN HFA) 90 mcg/actuation inhaler, Inhale 2 puffs into the lungs every 6 (six) hours as needed for Wheezing. Rescue, Disp: 18 g, Rfl: 5    amiodarone (PACERONE) 200 MG Tab, Take by mouth once daily., Disp: , Rfl:     ascorbic acid, vitamin C, (VITAMIN C) 1000 MG tablet, Take 1,000 mg by mouth once daily., Disp: , Rfl:     aspirin (ECOTRIN) 81 MG EC tablet, Take 81 mg by mouth once daily., Disp: , Rfl:     atorvastatin (LIPITOR) 80 MG tablet, Take 1 tablet by mouth once daily., Disp: , Rfl:     cilostazoL (PLETAL) 100 MG Tab, Take 100 mg by mouth 2 (two) times daily., Disp: , Rfl:     clopidogreL (PLAVIX) 75 mg tablet, Take 75 mg by mouth once daily., Disp: , Rfl:     ezetimibe (ZETIA) 10 mg tablet, Take 10 mg by mouth once daily., Disp: , Rfl:     ferrous sulfate (FEOSOL) 325 mg (65 mg iron) Tab tablet, Take 325 mg by mouth 3 (three) times daily., Disp: , Rfl:     furosemide (LASIX) 40 MG tablet, Take 40 mg by mouth once daily., Disp: , Rfl:     hydrALAZINE (APRESOLINE) 50 MG tablet, Take 50 mg by mouth 3 (three) times daily., Disp: , Rfl:     losartan (COZAAR) 50 MG tablet, Take 100 mg by mouth once daily., Disp: , Rfl:     metoprolol succinate (TOPROL-XL) 50 MG 24 hr tablet, Take 1 tablet by mouth once daily., Disp: , Rfl:     naproxen (NAPROSYN) 500 MG tablet, TAKE ONE TABLET BY MOUTH TWICE DAILY AS NEEDED, Disp: 20 tablet, Rfl: 4    propranoloL (INDERAL) 20 MG tablet, TAKE ONE TABLET BY MOUTH TWICE DAILY, Disp: 60 tablet, Rfl: 1     "tamsulosin (FLOMAX) 0.4 mg Cap, Take 1 capsule (0.4 mg total) by mouth once daily., Disp: 90 capsule, Rfl: 3           Review of Systems   All other systems reviewed and are negative.             Vitals:    02/15/23 0925 02/15/23 0926   BP: (!) 159/82 (!) 159/73   BP Location: Left arm Right arm   Patient Position: Sitting    BP Method: Large (Automatic)    Pulse: 64    Resp: 18    Temp: 97.4 °F (36.3 °C)    TempSrc: Temporal    SpO2: 97%    Weight: 94.2 kg (207 lb 9.6 oz)    Height: 5' 11" (1.803 m)         Physical Exam  Constitutional:       Appearance: Normal appearance. He is normal weight.   Cardiovascular:      Rate and Rhythm: Normal rate and regular rhythm.      Pulses: Normal pulses.      Heart sounds: Normal heart sounds.   Pulmonary:      Effort: Pulmonary effort is normal.      Breath sounds: Normal breath sounds.   Abdominal:      General: Abdomen is flat. Bowel sounds are normal.      Palpations: Abdomen is soft.   Musculoskeletal:         General: Normal range of motion.      Cervical back: Normal range of motion and neck supple.   Skin:     General: Skin is warm and dry.   Neurological:      General: No focal deficit present.      Mental Status: He is alert and oriented to person, place, and time. Mental status is at baseline.         Last Labs:     Lab Visit on 01/19/2023   Component Date Value    Iron 01/19/2023 80     Iron Saturation 01/19/2023 28     TIBC 01/19/2023 284     Ferritin 01/19/2023 88     Sodium 01/19/2023 139     Potassium 01/19/2023 4.5     Chloride 01/19/2023 109 (H)     CO2 01/19/2023 23     Anion Gap 01/19/2023 12     Glucose 01/19/2023 102     BUN 01/19/2023 32 (H)     Creatinine 01/19/2023 2.34 (H)     BUN/Creatinine Ratio 01/19/2023 14     Calcium 01/19/2023 9.0     Total Protein 01/19/2023 7.9     Albumin 01/19/2023 3.9     Globulin 01/19/2023 4.0     A/G Ratio 01/19/2023 1.0     Bilirubin, Total 01/19/2023 0.5     Alk Phos 01/19/2023 92     ALT 01/19/2023 43     AST " 01/19/2023 32     eGFR 01/19/2023 29 (L)     WBC 01/19/2023 5.11     RBC 01/19/2023 4.33 (L)     Hemoglobin 01/19/2023 12.1 (L)     Hematocrit 01/19/2023 35.4 (L)     MCV 01/19/2023 81.8     MCH 01/19/2023 27.9     MCHC 01/19/2023 34.2     RDW 01/19/2023 15.9 (H)     Platelet Count 01/19/2023 215     MPV 01/19/2023 11.6     Neutrophils % 01/19/2023 51.9 (L)     Lymphocytes % 01/19/2023 29.5     Monocytes % 01/19/2023 14.5 (H)     Eosinophils % 01/19/2023 3.1     Basophils % 01/19/2023 0.8     Immature Granulocytes % 01/19/2023 0.2     nRBC, Auto 01/19/2023 0.0     Neutrophils, Abs 01/19/2023 2.65     Lymphocytes, Absolute 01/19/2023 1.51     Monocytes, Absolute 01/19/2023 0.74     Eosinophils, Absolute 01/19/2023 0.16     Basophils, Absolute 01/19/2023 0.04     Immature Granulocytes, A* 01/19/2023 0.01     nRBC, Absolute 01/19/2023 0.00     Diff Type 01/19/2023 Auto        Last Imaging:  EGD w Dilation  Narrative: Procedure Date  5/16/22    Impression  Overall   Impression:  Mucosa of the esophagus is grossly normal. Distal   esophageal biopsies were obtained and the  esophagus was dilated with a   48F Cronin. A 2cm hiatus hernia is noted. Mild erythema was present in   the antrum and biopsies were obtained. Mucosa of the duodenum is normal.    Recommendation  Await pathology results; avoid nsaids; repeat dilation prn.    Indication  Hoarseness, blood in stool, esophageal dysphagia.    Providers  Attending: Johnathon Zambrano MD  Fellow:    Other Staff: Neelima Arevalo RN, Sridhar Barrera RN, Hector Gil CRNA    Medications  Moderate sedation administered by anesthesia staff - See anesthesia   record.    Preprocedure  A history and physical has been performed, and patient medication   allergies have been reviewed. The patient's tolerance of previous   anesthesia has been reviewed. The risks and benefits of the procedure and   the sedation options and risks were discussed with the patient. All    questions were answered and informed consent obtained.    ASA Score: ASA 2 - Patient with mild systemic disease with no functional   limitations  Mallampati Airway Score: II (hard and soft palate, upper portion of   tonsils anduvula visible)    Details of the Procedure  The patient underwent monitored anesthesia care, which was administered by   an anesthesia professional. The patient's blood pressure, heart rate,   level of consciousness, oxygen, respirations, ECG and ETCO2 were monitored   throughout the procedure. The scope was introduced through the mouth and   advanced to the third part of the duodenum. Retroflexion was performed in   the cardia. The patient's estimated blood loss was minimal (<5 mL). The   procedure was not difficult. The patient tolerated the procedure well.   There were no apparent complications.     Scope: Gastroscope  Scope Serial: [id number]    Events  Procedure Events   Event Event Time     Procedure Events   Event Event Time   SCOPE IN 5/16/2022 11:44 AM   SCOPE OUT 5/16/2022 11:48 AM     Findings  2 cm hiatal hernia  Dilated in the esophagus with Cronin dilator         **Labs and x-rays personally reviewed by me    ** reviewed      Objective:        Assessment:       1. BPH with obstruction/lower urinary tract symptoms        2. Primary hypertension        3. Coronary artery disease, unspecified vessel or lesion type, unspecified whether angina present, unspecified whether native or transplanted heart             Plan:         [unfilled]

## 2023-02-15 NOTE — PATIENT INSTRUCTIONS
Sami was seen today for follow-up.    Diagnoses and all orders for this visit:    BPH with obstruction/lower urinary tract symptoms  The following orders have not been finalized:  -     tamsulosin (FLOMAX) 0.4 mg Cap    Primary hypertension    Coronary artery disease, unspecified vessel or lesion type, unspecified whether angina present, unspecified whether native or transplanted heart    Other orders  The following orders have not been finalized:  -     clopidogreL (PLAVIX) 75 mg tablet  -     ferrous sulfate (FEOSOL) 325 mg (65 mg iron) Tab tablet  -     hydrALAZINE (APRESOLINE) 50 MG tablet  -     losartan (COZAAR) 50 MG tablet  -     propranoloL (INDERAL) 20 MG tablet

## 2023-02-20 ENCOUNTER — OFFICE VISIT (OUTPATIENT)
Dept: OTOLARYNGOLOGY | Facility: CLINIC | Age: 73
End: 2023-02-20
Payer: MEDICARE

## 2023-02-20 VITALS — BODY MASS INDEX: 28.98 KG/M2 | HEIGHT: 71 IN | WEIGHT: 207 LBS

## 2023-02-20 DIAGNOSIS — J38.1 VOCAL CORD POLYPS: Primary | ICD-10-CM

## 2023-02-20 DIAGNOSIS — R13.19 OTHER DYSPHAGIA: ICD-10-CM

## 2023-02-20 DIAGNOSIS — R49.0 HOARSENESS: ICD-10-CM

## 2023-02-20 PROCEDURE — 31575 DIAGNOSTIC LARYNGOSCOPY: CPT | Mod: PBBFAC | Performed by: OTOLARYNGOLOGY

## 2023-02-20 PROCEDURE — 4010F PR ACE/ARB THEARPY RXD/TAKEN: ICD-10-PCS | Mod: CPTII,,, | Performed by: OTOLARYNGOLOGY

## 2023-02-20 PROCEDURE — 1160F RVW MEDS BY RX/DR IN RCRD: CPT | Mod: CPTII,,, | Performed by: OTOLARYNGOLOGY

## 2023-02-20 PROCEDURE — 3008F PR BODY MASS INDEX (BMI) DOCUMENTED: ICD-10-PCS | Mod: CPTII,,, | Performed by: OTOLARYNGOLOGY

## 2023-02-20 PROCEDURE — 99214 OFFICE O/P EST MOD 30 MIN: CPT | Mod: S$PBB,25,, | Performed by: OTOLARYNGOLOGY

## 2023-02-20 PROCEDURE — 31575 DIAGNOSTIC LARYNGOSCOPY: CPT | Mod: S$PBB,,, | Performed by: OTOLARYNGOLOGY

## 2023-02-20 PROCEDURE — 3008F BODY MASS INDEX DOCD: CPT | Mod: CPTII,,, | Performed by: OTOLARYNGOLOGY

## 2023-02-20 PROCEDURE — 4010F ACE/ARB THERAPY RXD/TAKEN: CPT | Mod: CPTII,,, | Performed by: OTOLARYNGOLOGY

## 2023-02-20 PROCEDURE — 99214 OFFICE O/P EST MOD 30 MIN: CPT | Mod: PBBFAC | Performed by: OTOLARYNGOLOGY

## 2023-02-20 PROCEDURE — 31575 PR LARYNGOSCOPY, FLEXIBLE; DIAGNOSTIC: ICD-10-PCS | Mod: S$PBB,,, | Performed by: OTOLARYNGOLOGY

## 2023-02-20 PROCEDURE — 1159F PR MEDICATION LIST DOCUMENTED IN MEDICAL RECORD: ICD-10-PCS | Mod: CPTII,,, | Performed by: OTOLARYNGOLOGY

## 2023-02-20 PROCEDURE — 1160F PR REVIEW ALL MEDS BY PRESCRIBER/CLIN PHARMACIST DOCUMENTED: ICD-10-PCS | Mod: CPTII,,, | Performed by: OTOLARYNGOLOGY

## 2023-02-20 PROCEDURE — 99214 PR OFFICE/OUTPT VISIT, EST, LEVL IV, 30-39 MIN: ICD-10-PCS | Mod: S$PBB,25,, | Performed by: OTOLARYNGOLOGY

## 2023-02-20 PROCEDURE — 1159F MED LIST DOCD IN RCRD: CPT | Mod: CPTII,,, | Performed by: OTOLARYNGOLOGY

## 2023-02-20 NOTE — PROGRESS NOTES
Subjective:       Patient ID: Sami Kerns is a 73 y.o. male.    Chief Complaint: Follow-up (3 month follow-up on vocal cord polyps. Pt continues to have a raspy voice. )  States no problems except sometimes swallowing  Follow-up    Review of Systems   HENT:  Positive for trouble swallowing and voice change.    All other systems reviewed and are negative.    Objective:      Physical Exam  General: NAD hoarse  Head: Normocephalic, atraumatic, no facial asymmetry/normal strength,  Ears: Both auricules normal in appearance, w/o deformities tympanic membranes normal external auditory canals normal  Nose: External nose w/o deformities normal turbinates no drainage or inflammation  Oral Cavity: Lips, gums, floor of mouth, tongue hard palate, and buccal mucosa without mass/lesion  Oropharynx: Mucosa pink and moist, soft palate, posterior pharynx and oropharyngeal wall without mass/lesion  Neck: Supple, symmetric, trachea midline, no palpable mass/lesion, no palpable cervical lymphadenopathy  Skin: Warm and dry, no concerning lesions  Respiratory: Respirations even, unlabored     Nasal/Nasopharyngo/Laryn/Hypopharyngoscopy Procedures   Procedure: Flexible laryngoscopy  In order to fully examine the upper aerodigestive tract, including the larynx, in a patient with a hyperactive gag reflex, flexible endoscopy is required.  After explaining the procedure and obtaining verbal consent, a timeout was performed with the patient's participation according to the universal protocol. Both nasal cavities were anesthetized with 4% Xylocaine spray mixed with Robinson-Synephrine. The flexible laryngoscope was inserted into the nasal cavity and advanced to visualize the nasal cavity, nasopharynx, the posterior oropharynx, hypopharynx, and the endolarynx with the above findings noted. The scope was removed and the procedure terminated. The patient tolerated this procedure well without apparent complication.      Procedure:  Diagnostic nasal,  nasopharyngoscopy, laryngoscopy and hypopharyngoscopy.    Routine preparation with local atomizer with 1% neosynephrine and lidocaine . With customary flexible endoscope.     NOSE:   External:  No gross deformity   Intranasal:    Mucosa:  No polyps, ulcers or lesions.    Septum:  No gross deformity.    Turbinates:  Not enlarged.    Nasopharynx:  No lesions.   Mucosa:  No lesions.   Adenoids:  Present.   Posterior Choanae:  Patent.   Eustachian Tubes:  Patent.  Larynx/hypopharynx:   Epiglottis:  No lesions, without edema.   AE Folds:  No lesions.   Vocal cords:  + polyps Right ; nl mobility   Subglottis: No obvious stenosis   Hypopharynx:  No lesions.   Piriform sinus:  No pooling or lesions.   Post Cricoid:  No edema or erythema     Assessment:       1. Vocal cord polyps    2. Hoarseness    3. Other dysphagia        Plan:       Discussed removal vs watching   Will watch and f/u 3 months may need removal

## 2023-03-07 ENCOUNTER — OFFICE VISIT (OUTPATIENT)
Dept: FAMILY MEDICINE | Facility: CLINIC | Age: 73
End: 2023-03-07
Payer: MEDICARE

## 2023-03-07 VITALS
WEIGHT: 198.38 LBS | BODY MASS INDEX: 27.77 KG/M2 | HEART RATE: 60 BPM | HEIGHT: 71 IN | RESPIRATION RATE: 18 BRPM | TEMPERATURE: 97 F | DIASTOLIC BLOOD PRESSURE: 60 MMHG | OXYGEN SATURATION: 95 % | SYSTOLIC BLOOD PRESSURE: 115 MMHG

## 2023-03-07 DIAGNOSIS — N13.8 BPH WITH OBSTRUCTION/LOWER URINARY TRACT SYMPTOMS: Chronic | ICD-10-CM

## 2023-03-07 DIAGNOSIS — Z72.0 TOBACCO USER: Primary | ICD-10-CM

## 2023-03-07 DIAGNOSIS — I20.0 UNSTABLE ANGINA: Chronic | ICD-10-CM

## 2023-03-07 DIAGNOSIS — N40.1 BPH WITH OBSTRUCTION/LOWER URINARY TRACT SYMPTOMS: Chronic | ICD-10-CM

## 2023-03-07 DIAGNOSIS — J41.0 SIMPLE CHRONIC BRONCHITIS: Chronic | ICD-10-CM

## 2023-03-07 PROCEDURE — 3074F SYST BP LT 130 MM HG: CPT | Mod: ,,, | Performed by: INTERNAL MEDICINE

## 2023-03-07 PROCEDURE — 99214 OFFICE O/P EST MOD 30 MIN: CPT | Mod: ,,, | Performed by: INTERNAL MEDICINE

## 2023-03-07 PROCEDURE — 3008F PR BODY MASS INDEX (BMI) DOCUMENTED: ICD-10-PCS | Mod: ,,, | Performed by: INTERNAL MEDICINE

## 2023-03-07 PROCEDURE — 3078F DIAST BP <80 MM HG: CPT | Mod: ,,, | Performed by: INTERNAL MEDICINE

## 2023-03-07 PROCEDURE — 3288F FALL RISK ASSESSMENT DOCD: CPT | Mod: ,,, | Performed by: INTERNAL MEDICINE

## 2023-03-07 PROCEDURE — 4010F ACE/ARB THERAPY RXD/TAKEN: CPT | Mod: ,,, | Performed by: INTERNAL MEDICINE

## 2023-03-07 PROCEDURE — 3078F PR MOST RECENT DIASTOLIC BLOOD PRESSURE < 80 MM HG: ICD-10-PCS | Mod: ,,, | Performed by: INTERNAL MEDICINE

## 2023-03-07 PROCEDURE — 1160F PR REVIEW ALL MEDS BY PRESCRIBER/CLIN PHARMACIST DOCUMENTED: ICD-10-PCS | Mod: ,,, | Performed by: INTERNAL MEDICINE

## 2023-03-07 PROCEDURE — 3008F BODY MASS INDEX DOCD: CPT | Mod: ,,, | Performed by: INTERNAL MEDICINE

## 2023-03-07 PROCEDURE — 1159F PR MEDICATION LIST DOCUMENTED IN MEDICAL RECORD: ICD-10-PCS | Mod: ,,, | Performed by: INTERNAL MEDICINE

## 2023-03-07 PROCEDURE — 1101F PR PT FALLS ASSESS DOC 0-1 FALLS W/OUT INJ PAST YR: ICD-10-PCS | Mod: ,,, | Performed by: INTERNAL MEDICINE

## 2023-03-07 PROCEDURE — 3288F PR FALLS RISK ASSESSMENT DOCUMENTED: ICD-10-PCS | Mod: ,,, | Performed by: INTERNAL MEDICINE

## 2023-03-07 PROCEDURE — 1126F AMNT PAIN NOTED NONE PRSNT: CPT | Mod: ,,, | Performed by: INTERNAL MEDICINE

## 2023-03-07 PROCEDURE — 99214 PR OFFICE/OUTPT VISIT, EST, LEVL IV, 30-39 MIN: ICD-10-PCS | Mod: ,,, | Performed by: INTERNAL MEDICINE

## 2023-03-07 PROCEDURE — 4010F PR ACE/ARB THEARPY RXD/TAKEN: ICD-10-PCS | Mod: ,,, | Performed by: INTERNAL MEDICINE

## 2023-03-07 PROCEDURE — 1160F RVW MEDS BY RX/DR IN RCRD: CPT | Mod: ,,, | Performed by: INTERNAL MEDICINE

## 2023-03-07 PROCEDURE — 3074F PR MOST RECENT SYSTOLIC BLOOD PRESSURE < 130 MM HG: ICD-10-PCS | Mod: ,,, | Performed by: INTERNAL MEDICINE

## 2023-03-07 PROCEDURE — 1126F PR PAIN SEVERITY QUANTIFIED, NO PAIN PRESENT: ICD-10-PCS | Mod: ,,, | Performed by: INTERNAL MEDICINE

## 2023-03-07 PROCEDURE — 1101F PT FALLS ASSESS-DOCD LE1/YR: CPT | Mod: ,,, | Performed by: INTERNAL MEDICINE

## 2023-03-07 PROCEDURE — 1159F MED LIST DOCD IN RCRD: CPT | Mod: ,,, | Performed by: INTERNAL MEDICINE

## 2023-03-07 RX ORDER — VALSARTAN 320 MG/1
320 TABLET ORAL
COMMUNITY
Start: 2023-02-28

## 2023-03-07 RX ORDER — NITROGLYCERIN 0.4 MG/1
TABLET SUBLINGUAL
COMMUNITY
Start: 2023-02-28

## 2023-03-08 PROBLEM — Z72.0 TOBACCO USER: Status: ACTIVE | Noted: 2023-03-08

## 2023-03-08 PROBLEM — J41.0 SIMPLE CHRONIC BRONCHITIS: Chronic | Status: ACTIVE | Noted: 2023-03-08

## 2023-03-08 PROBLEM — I20.0 UNSTABLE ANGINA: Chronic | Status: ACTIVE | Noted: 2023-03-08

## 2023-03-08 RX ORDER — TAMSULOSIN HYDROCHLORIDE 0.4 MG/1
0.4 CAPSULE ORAL DAILY
Qty: 90 CAPSULE | Refills: 3 | Status: SHIPPED | OUTPATIENT
Start: 2023-03-08 | End: 2024-03-26

## 2023-03-08 NOTE — PROGRESS NOTES
Subjective:       Patient ID: Sami Kerns is a 73 y.o. male.    Chief Complaint: Hospital Follow Up (Patient was recently admitted at [facility] )    HPI  .  Patient seen and evaluated.  Patient has intermittent chest pain at rest.  Patient also has intermittent cough and he still smokes tobacco.  In regards to his chest pain he states that he has had a stress test at [facility] and will follow up with his cardiologist within 1 week.    Current Medications:    Current Outpatient Medications:     albuterol (VENTOLIN HFA) 90 mcg/actuation inhaler, Inhale 2 puffs into the lungs every 6 (six) hours as needed for Wheezing. Rescue, Disp: 18 g, Rfl: 5    amiodarone (PACERONE) 200 MG Tab, Take by mouth once daily., Disp: , Rfl:     ascorbic acid, vitamin C, (VITAMIN C) 1000 MG tablet, Take 1,000 mg by mouth once daily., Disp: , Rfl:     aspirin (ECOTRIN) 81 MG EC tablet, Take 81 mg by mouth once daily., Disp: , Rfl:     atorvastatin (LIPITOR) 80 MG tablet, Take 1 tablet by mouth once daily., Disp: , Rfl:     cilostazoL (PLETAL) 100 MG Tab, Take 100 mg by mouth 2 (two) times daily., Disp: , Rfl:     clopidogreL (PLAVIX) 75 mg tablet, Take 1 tablet (75 mg total) by mouth once daily., Disp: 90 tablet, Rfl: 1    ezetimibe (ZETIA) 10 mg tablet, Take 10 mg by mouth once daily., Disp: , Rfl:     ferrous sulfate (FEOSOL) 325 mg (65 mg iron) Tab tablet, Take 1 tablet (325 mg total) by mouth 3 (three) times daily., Disp: 90 tablet, Rfl: 1    furosemide (LASIX) 40 MG tablet, Take 40 mg by mouth once daily., Disp: , Rfl:     hydrALAZINE (APRESOLINE) 50 MG tablet, Take 1 tablet (50 mg total) by mouth 3 (three) times daily., Disp: 90 tablet, Rfl: 1    losartan (COZAAR) 50 MG tablet, Take 2 tablets (100 mg total) by mouth once daily., Disp: 90 tablet, Rfl: 1    metoprolol succinate (TOPROL-XL) 50 MG 24 hr tablet, Take 1 tablet by mouth once daily., Disp: , Rfl:     propranoloL (INDERAL) 20 MG tablet, Take 1 tablet (20 mg total)  "by mouth 2 (two) times daily., Disp: 90 tablet, Rfl: 1    naproxen (NAPROSYN) 500 MG tablet, TAKE ONE TABLET BY MOUTH TWICE DAILY AS NEEDED, Disp: 20 tablet, Rfl: 4    nitroGLYCERIN (NITROSTAT) 0.4 MG SL tablet, Place under the tongue., Disp: , Rfl:     tamsulosin (FLOMAX) 0.4 mg Cap, Take 1 capsule (0.4 mg total) by mouth once daily., Disp: 90 capsule, Rfl: 3    valsartan (DIOVAN) 320 MG tablet, Take 320 mg by mouth., Disp: , Rfl:        Due to the many adverse health risks of smoking tobacco,  Patient has been encouraged to quit smoking, and smoking sensation treatments have been   treatments have been offered and a  smoking cessation class has been recommended to the patient      Review of Systems   Constitutional:  Negative for appetite change and fatigue.   HENT:  Negative for nasal congestion and rhinorrhea.    Eyes:  Negative for redness and visual disturbance.   Respiratory:  Negative for cough and shortness of breath.    Cardiovascular:  Positive for chest pain. Negative for leg swelling.   Gastrointestinal:  Negative for abdominal pain, constipation and diarrhea.   Endocrine: Negative for polyuria.   Genitourinary:  Negative for difficulty urinating, dysuria and erectile dysfunction.   Musculoskeletal:  Negative for arthralgias and myalgias.   Integumentary:  Negative for rash and mole/lesion.   All other systems reviewed and are negative.             Vitals:    03/07/23 1405   BP: 115/60   BP Location: Left arm   Patient Position: Sitting   BP Method: Medium (Manual)   Pulse: 60   Resp: 18   Temp: 97.1 °F (36.2 °C)   TempSrc: Temporal   SpO2: 95%   Weight: 90 kg (198 lb 6.4 oz)   Height: 5' 11" (1.803 m)        Physical Exam  Vitals and nursing note reviewed.   Constitutional:       Appearance: Normal appearance.   Cardiovascular:      Rate and Rhythm: Normal rate and regular rhythm.      Pulses: Normal pulses.      Heart sounds: Normal heart sounds.   Pulmonary:      Effort: Pulmonary effort is normal. "      Breath sounds: Normal breath sounds.   Abdominal:      General: Abdomen is flat. Bowel sounds are normal.      Palpations: Abdomen is soft.   Musculoskeletal:         General: Normal range of motion.   Skin:     General: Skin is warm and dry.   Neurological:      General: No focal deficit present.      Mental Status: He is alert and oriented to person, place, and time. Mental status is at baseline.         Last Labs:     No visits with results within 1 Month(s) from this visit.   Latest known visit with results is:   Lab Visit on 01/19/2023   Component Date Value    Iron 01/19/2023 80     Iron Saturation 01/19/2023 28     TIBC 01/19/2023 284     Ferritin 01/19/2023 88     Sodium 01/19/2023 139     Potassium 01/19/2023 4.5     Chloride 01/19/2023 109 (H)     CO2 01/19/2023 23     Anion Gap 01/19/2023 12     Glucose 01/19/2023 102     BUN 01/19/2023 32 (H)     Creatinine 01/19/2023 2.34 (H)     BUN/Creatinine Ratio 01/19/2023 14     Calcium 01/19/2023 9.0     Total Protein 01/19/2023 7.9     Albumin 01/19/2023 3.9     Globulin 01/19/2023 4.0     A/G Ratio 01/19/2023 1.0     Bilirubin, Total 01/19/2023 0.5     Alk Phos 01/19/2023 92     ALT 01/19/2023 43     AST 01/19/2023 32     eGFR 01/19/2023 29 (L)     WBC 01/19/2023 5.11     RBC 01/19/2023 4.33 (L)     Hemoglobin 01/19/2023 12.1 (L)     Hematocrit 01/19/2023 35.4 (L)     MCV 01/19/2023 81.8     MCH 01/19/2023 27.9     MCHC 01/19/2023 34.2     RDW 01/19/2023 15.9 (H)     Platelet Count 01/19/2023 215     MPV 01/19/2023 11.6     Neutrophils % 01/19/2023 51.9 (L)     Lymphocytes % 01/19/2023 29.5     Monocytes % 01/19/2023 14.5 (H)     Eosinophils % 01/19/2023 3.1     Basophils % 01/19/2023 0.8     Immature Granulocytes % 01/19/2023 0.2     nRBC, Auto 01/19/2023 0.0     Neutrophils, Abs 01/19/2023 2.65     Lymphocytes, Absolute 01/19/2023 1.51     Monocytes, Absolute 01/19/2023 0.74     Eosinophils, Absolute 01/19/2023 0.16     Basophils, Absolute 01/19/2023  0.04     Immature Granulocytes, A* 01/19/2023 0.01     nRBC, Absolute 01/19/2023 0.00     Diff Type 01/19/2023 Auto        Last Imaging:  EGD w Dilation  Narrative: Procedure Date  5/16/22    Impression  Overall   Impression:  Mucosa of the esophagus is grossly normal. Distal   esophageal biopsies were obtained and the  esophagus was dilated with a   48F Cronin. A 2cm hiatus hernia is noted. Mild erythema was present in   the antrum and biopsies were obtained. Mucosa of the duodenum is normal.    Recommendation  Await pathology results; avoid nsaids; repeat dilation prn.    Indication  Hoarseness, blood in stool, esophageal dysphagia.    Providers  Attending: Johnathon Zambrano MD  Fellow:    Other Staff: Neelima Arevalo RN, Sridhar Barrera RN, Hector Gil CRNA    Medications  Moderate sedation administered by anesthesia staff - See anesthesia   record.    Preprocedure  A history and physical has been performed, and patient medication   allergies have been reviewed. The patient's tolerance of previous   anesthesia has been reviewed. The risks and benefits of the procedure and   the sedation options and risks were discussed with the patient. All   questions were answered and informed consent obtained.    ASA Score: ASA 2 - Patient with mild systemic disease with no functional   limitations  Mallampati Airway Score: II (hard and soft palate, upper portion of   tonsils anduvula visible)    Details of the Procedure  The patient underwent monitored anesthesia care, which was administered by   an anesthesia professional. The patient's blood pressure, heart rate,   level of consciousness, oxygen, respirations, ECG and ETCO2 were monitored   throughout the procedure. The scope was introduced through the mouth and   advanced to the third part of the duodenum. Retroflexion was performed in   the cardia. The patient's estimated blood loss was minimal (<5 mL). The   procedure was not difficult. The patient  tolerated the procedure well.   There were no apparent complications.     Scope: Gastroscope  Scope Serial: [id number]    Events  Procedure Events   Event Event Time     Procedure Events   Event Event Time   SCOPE IN 5/16/2022 11:44 AM   SCOPE OUT 5/16/2022 11:48 AM     Findings  2 cm hiatal hernia  Dilated in the esophagus with Cronin dilator         **Labs and x-rays personally reviewed by me    ** reviewed      Objective:        Assessment:       1. Tobacco user  Ambulatory referral/consult to Smoking Cessation Program      2. BPH with obstruction/lower urinary tract symptoms  tamsulosin (FLOMAX) 0.4 mg Cap    Refill his Flomax.      3. Unstable angina      Currently chest pain-free however patient is in close follow-up with his cardiologist at [place].      4. Simple chronic bronchitis      Recommend tobacco cessation.           Plan:         [unfilled]

## 2023-04-03 ENCOUNTER — OFFICE VISIT (OUTPATIENT)
Dept: FAMILY MEDICINE | Facility: CLINIC | Age: 73
End: 2023-04-03
Payer: MEDICARE

## 2023-04-03 VITALS
WEIGHT: 207 LBS | DIASTOLIC BLOOD PRESSURE: 60 MMHG | SYSTOLIC BLOOD PRESSURE: 132 MMHG | HEIGHT: 71 IN | OXYGEN SATURATION: 95 % | HEART RATE: 60 BPM | TEMPERATURE: 99 F | BODY MASS INDEX: 28.98 KG/M2 | RESPIRATION RATE: 16 BRPM

## 2023-04-03 DIAGNOSIS — Z23 NEED FOR SHINGLES VACCINE: ICD-10-CM

## 2023-04-03 DIAGNOSIS — Z12.5 SCREENING FOR PROSTATE CANCER: ICD-10-CM

## 2023-04-03 DIAGNOSIS — Z00.00 ENCOUNTER FOR SUBSEQUENT ANNUAL WELLNESS VISIT (AWV) IN MEDICARE PATIENT: Primary | ICD-10-CM

## 2023-04-03 DIAGNOSIS — J44.9 CHRONIC OBSTRUCTIVE PULMONARY DISEASE, UNSPECIFIED COPD TYPE: ICD-10-CM

## 2023-04-03 DIAGNOSIS — Z23 NEED FOR TETANUS, DIPHTHERIA, AND ACELLULAR PERTUSSIS (TDAP) VACCINE: ICD-10-CM

## 2023-04-03 DIAGNOSIS — I73.9 CLAUDICATION: ICD-10-CM

## 2023-04-03 DIAGNOSIS — I73.9 PVD (PERIPHERAL VASCULAR DISEASE): ICD-10-CM

## 2023-04-03 DIAGNOSIS — Z13.6 SCREENING FOR ABDOMINAL AORTIC ANEURYSM: ICD-10-CM

## 2023-04-03 DIAGNOSIS — H52.7 REFRACTION DISORDER: ICD-10-CM

## 2023-04-03 DIAGNOSIS — Z12.2 SCREENING FOR LUNG CANCER: ICD-10-CM

## 2023-04-03 DIAGNOSIS — I10 PRIMARY HYPERTENSION: ICD-10-CM

## 2023-04-03 LAB
CHOLEST SERPL-MCNC: 106 MG/DL (ref 0–200)
CHOLEST/HDLC SERPL: 2.8 {RATIO}
HDLC SERPL-MCNC: 38 MG/DL (ref 40–60)
LDLC SERPL CALC-MCNC: 27 MG/DL
NONHDLC SERPL-MCNC: 68 MG/DL
PSA SERPL-MCNC: 1.88 NG/ML
TRIGL SERPL-MCNC: 204 MG/DL (ref 35–150)
VLDLC SERPL-MCNC: 41 MG/DL

## 2023-04-03 PROCEDURE — 1101F PR PT FALLS ASSESS DOC 0-1 FALLS W/OUT INJ PAST YR: ICD-10-PCS | Mod: ,,, | Performed by: NURSE PRACTITIONER

## 2023-04-03 PROCEDURE — 3008F BODY MASS INDEX DOCD: CPT | Mod: ,,, | Performed by: NURSE PRACTITIONER

## 2023-04-03 PROCEDURE — 3288F PR FALLS RISK ASSESSMENT DOCUMENTED: ICD-10-PCS | Mod: ,,, | Performed by: NURSE PRACTITIONER

## 2023-04-03 PROCEDURE — G0103 PSA, SCREENING: ICD-10-PCS | Mod: ,,, | Performed by: CLINICAL MEDICAL LABORATORY

## 2023-04-03 PROCEDURE — 1160F RVW MEDS BY RX/DR IN RCRD: CPT | Mod: ,,, | Performed by: NURSE PRACTITIONER

## 2023-04-03 PROCEDURE — 1126F AMNT PAIN NOTED NONE PRSNT: CPT | Mod: ,,, | Performed by: NURSE PRACTITIONER

## 2023-04-03 PROCEDURE — 80061 LIPID PANEL: CPT | Mod: ,,, | Performed by: CLINICAL MEDICAL LABORATORY

## 2023-04-03 PROCEDURE — 1101F PT FALLS ASSESS-DOCD LE1/YR: CPT | Mod: ,,, | Performed by: NURSE PRACTITIONER

## 2023-04-03 PROCEDURE — 80061 LIPID PANEL: ICD-10-PCS | Mod: ,,, | Performed by: CLINICAL MEDICAL LABORATORY

## 2023-04-03 PROCEDURE — G0103 PSA SCREENING: HCPCS | Mod: ,,, | Performed by: CLINICAL MEDICAL LABORATORY

## 2023-04-03 PROCEDURE — 1159F PR MEDICATION LIST DOCUMENTED IN MEDICAL RECORD: ICD-10-PCS | Mod: ,,, | Performed by: NURSE PRACTITIONER

## 2023-04-03 PROCEDURE — 3288F FALL RISK ASSESSMENT DOCD: CPT | Mod: ,,, | Performed by: NURSE PRACTITIONER

## 2023-04-03 PROCEDURE — 3008F PR BODY MASS INDEX (BMI) DOCUMENTED: ICD-10-PCS | Mod: ,,, | Performed by: NURSE PRACTITIONER

## 2023-04-03 PROCEDURE — 3078F DIAST BP <80 MM HG: CPT | Mod: ,,, | Performed by: NURSE PRACTITIONER

## 2023-04-03 PROCEDURE — G0439 PR MEDICARE ANNUAL WELLNESS SUBSEQUENT VISIT: ICD-10-PCS | Mod: ,,, | Performed by: NURSE PRACTITIONER

## 2023-04-03 PROCEDURE — G0439 PPPS, SUBSEQ VISIT: HCPCS | Mod: ,,, | Performed by: NURSE PRACTITIONER

## 2023-04-03 PROCEDURE — 3075F SYST BP GE 130 - 139MM HG: CPT | Mod: ,,, | Performed by: NURSE PRACTITIONER

## 2023-04-03 PROCEDURE — 4010F ACE/ARB THERAPY RXD/TAKEN: CPT | Mod: ,,, | Performed by: NURSE PRACTITIONER

## 2023-04-03 PROCEDURE — 1160F PR REVIEW ALL MEDS BY PRESCRIBER/CLIN PHARMACIST DOCUMENTED: ICD-10-PCS | Mod: ,,, | Performed by: NURSE PRACTITIONER

## 2023-04-03 PROCEDURE — 3078F PR MOST RECENT DIASTOLIC BLOOD PRESSURE < 80 MM HG: ICD-10-PCS | Mod: ,,, | Performed by: NURSE PRACTITIONER

## 2023-04-03 PROCEDURE — 3075F PR MOST RECENT SYSTOLIC BLOOD PRESS GE 130-139MM HG: ICD-10-PCS | Mod: ,,, | Performed by: NURSE PRACTITIONER

## 2023-04-03 PROCEDURE — 4010F PR ACE/ARB THEARPY RXD/TAKEN: ICD-10-PCS | Mod: ,,, | Performed by: NURSE PRACTITIONER

## 2023-04-03 PROCEDURE — 1126F PR PAIN SEVERITY QUANTIFIED, NO PAIN PRESENT: ICD-10-PCS | Mod: ,,, | Performed by: NURSE PRACTITIONER

## 2023-04-03 PROCEDURE — 1159F MED LIST DOCD IN RCRD: CPT | Mod: ,,, | Performed by: NURSE PRACTITIONER

## 2023-04-03 RX ORDER — ZOSTER VACCINE RECOMBINANT, ADJUVANTED 50 MCG/0.5
0.5 KIT INTRAMUSCULAR ONCE
Qty: 1 EACH | Refills: 0 | Status: SHIPPED | OUTPATIENT
Start: 2023-04-03 | End: 2023-04-03

## 2023-04-03 RX ORDER — ISOSORBIDE MONONITRATE 30 MG/1
15 TABLET, EXTENDED RELEASE ORAL
COMMUNITY
Start: 2023-03-14

## 2023-04-03 NOTE — PATIENT INSTRUCTIONS
Counseling and Referral of Other Preventative  (Italic type indicates deductible and co-insurance are waived)    Patient Name: Sami Kerns  Today's Date: 4/3/2023    Health Maintenance         Date Due Completion Date    COVID-19 Vaccine (1) Never done ---PATRICIA faxed    TETANUS VACCINE Never done ---sent to pharmacy    Shingles Vaccine (1 of 2) Never done ---sent to pharmacy    LDCT Lung Screen 07/02/2021 7/2/2020-ordered this visit    Influenza Vaccine (1) 09/01/2022 10/4/2021ROI faxed    Lipid Panel 03/29/2023 3/29/2022-ordered this visit    High Dose Statin 03/08/2024 3/8/2023    Aspirin/Antiplatelet Therapy 03/08/2024 3/8/2023    Colorectal Cancer Screening 11/29/2024 11/29/2021          No orders of the defined types were placed in this encounter.

## 2023-06-09 DIAGNOSIS — Z71.89 COMPLEX CARE COORDINATION: ICD-10-CM

## 2023-06-19 ENCOUNTER — OFFICE VISIT (OUTPATIENT)
Dept: FAMILY MEDICINE | Facility: CLINIC | Age: 73
End: 2023-06-19
Payer: MEDICARE

## 2023-06-19 VITALS
WEIGHT: 202.38 LBS | OXYGEN SATURATION: 96 % | TEMPERATURE: 98 F | DIASTOLIC BLOOD PRESSURE: 66 MMHG | HEART RATE: 60 BPM | BODY MASS INDEX: 28.33 KG/M2 | RESPIRATION RATE: 17 BRPM | SYSTOLIC BLOOD PRESSURE: 139 MMHG | HEIGHT: 71 IN

## 2023-06-19 DIAGNOSIS — N40.0 BENIGN PROSTATIC HYPERPLASIA, UNSPECIFIED WHETHER LOWER URINARY TRACT SYMPTOMS PRESENT: Primary | ICD-10-CM

## 2023-06-19 DIAGNOSIS — N50.82 SCROTAL PAIN: ICD-10-CM

## 2023-06-19 PROCEDURE — 99214 PR OFFICE/OUTPT VISIT, EST, LEVL IV, 30-39 MIN: ICD-10-PCS | Mod: ,,, | Performed by: INTERNAL MEDICINE

## 2023-06-19 PROCEDURE — 3078F PR MOST RECENT DIASTOLIC BLOOD PRESSURE < 80 MM HG: ICD-10-PCS | Mod: ,,, | Performed by: INTERNAL MEDICINE

## 2023-06-19 PROCEDURE — 1126F AMNT PAIN NOTED NONE PRSNT: CPT | Mod: ,,, | Performed by: INTERNAL MEDICINE

## 2023-06-19 PROCEDURE — 4010F ACE/ARB THERAPY RXD/TAKEN: CPT | Mod: ,,, | Performed by: INTERNAL MEDICINE

## 2023-06-19 PROCEDURE — 1159F MED LIST DOCD IN RCRD: CPT | Mod: ,,, | Performed by: INTERNAL MEDICINE

## 2023-06-19 PROCEDURE — 3078F DIAST BP <80 MM HG: CPT | Mod: ,,, | Performed by: INTERNAL MEDICINE

## 2023-06-19 PROCEDURE — 1126F PR PAIN SEVERITY QUANTIFIED, NO PAIN PRESENT: ICD-10-PCS | Mod: ,,, | Performed by: INTERNAL MEDICINE

## 2023-06-19 PROCEDURE — 4010F PR ACE/ARB THEARPY RXD/TAKEN: ICD-10-PCS | Mod: ,,, | Performed by: INTERNAL MEDICINE

## 2023-06-19 PROCEDURE — 3075F SYST BP GE 130 - 139MM HG: CPT | Mod: ,,, | Performed by: INTERNAL MEDICINE

## 2023-06-19 PROCEDURE — 1160F RVW MEDS BY RX/DR IN RCRD: CPT | Mod: ,,, | Performed by: INTERNAL MEDICINE

## 2023-06-19 PROCEDURE — 3288F PR FALLS RISK ASSESSMENT DOCUMENTED: ICD-10-PCS | Mod: ,,, | Performed by: INTERNAL MEDICINE

## 2023-06-19 PROCEDURE — 3288F FALL RISK ASSESSMENT DOCD: CPT | Mod: ,,, | Performed by: INTERNAL MEDICINE

## 2023-06-19 PROCEDURE — 3075F PR MOST RECENT SYSTOLIC BLOOD PRESS GE 130-139MM HG: ICD-10-PCS | Mod: ,,, | Performed by: INTERNAL MEDICINE

## 2023-06-19 PROCEDURE — 1160F PR REVIEW ALL MEDS BY PRESCRIBER/CLIN PHARMACIST DOCUMENTED: ICD-10-PCS | Mod: ,,, | Performed by: INTERNAL MEDICINE

## 2023-06-19 PROCEDURE — 1159F PR MEDICATION LIST DOCUMENTED IN MEDICAL RECORD: ICD-10-PCS | Mod: ,,, | Performed by: INTERNAL MEDICINE

## 2023-06-19 PROCEDURE — 99214 OFFICE O/P EST MOD 30 MIN: CPT | Mod: ,,, | Performed by: INTERNAL MEDICINE

## 2023-06-19 PROCEDURE — 3008F PR BODY MASS INDEX (BMI) DOCUMENTED: ICD-10-PCS | Mod: ,,, | Performed by: INTERNAL MEDICINE

## 2023-06-19 PROCEDURE — 1101F PT FALLS ASSESS-DOCD LE1/YR: CPT | Mod: ,,, | Performed by: INTERNAL MEDICINE

## 2023-06-19 PROCEDURE — 1101F PR PT FALLS ASSESS DOC 0-1 FALLS W/OUT INJ PAST YR: ICD-10-PCS | Mod: ,,, | Performed by: INTERNAL MEDICINE

## 2023-06-19 PROCEDURE — 3008F BODY MASS INDEX DOCD: CPT | Mod: ,,, | Performed by: INTERNAL MEDICINE

## 2023-06-19 RX ORDER — DOXYCYCLINE 100 MG/1
100 CAPSULE ORAL 2 TIMES DAILY
Qty: 20 CAPSULE | Refills: 0 | Status: SHIPPED | OUTPATIENT
Start: 2023-06-19

## 2023-06-19 RX ORDER — HYDROCODONE BITARTRATE AND ACETAMINOPHEN 7.5; 325 MG/1; MG/1
1 TABLET ORAL DAILY PRN
Qty: 12 TABLET | Refills: 0 | Status: SHIPPED | OUTPATIENT
Start: 2023-06-19

## 2023-06-19 RX ORDER — LATANOPROST 50 UG/ML
1 SOLUTION/ DROPS OPHTHALMIC NIGHTLY
COMMUNITY
Start: 2023-06-05

## 2023-06-19 RX ORDER — TIMOLOL MALEATE 5 MG/ML
SOLUTION/ DROPS OPHTHALMIC
COMMUNITY
Start: 2023-06-05

## 2023-06-21 PROBLEM — N50.82 SCROTAL PAIN: Status: ACTIVE | Noted: 2023-06-21

## 2023-06-21 PROBLEM — N40.0 BENIGN PROSTATIC HYPERPLASIA: Status: ACTIVE | Noted: 2022-01-20

## 2023-06-21 NOTE — PROGRESS NOTES
Subjective:       Patient ID: Sami Kerns is a 73 y.o. male.    Chief Complaint: Prostate Problem    HPI  .  73-year-old nice gentleman presents today he has a history of BPH.  Patient also is complaining of scrotal discomfort.  States that his testicles have been bothering him.  He describes the pain a 7 8/10.  The plan is to start doxycycline 100 mg 1 p.o. b.i.d. and I will start Richa Norco 7.5 mg 1 p.o. q.day p.r.n. severe pain 12.  I will see the patient again in 3 weeks if his pain is not improved then I will send him for ultrasound of his scrotum.    Current Medications:    Current Outpatient Medications:     albuterol (VENTOLIN HFA) 90 mcg/actuation inhaler, Inhale 2 puffs into the lungs every 6 (six) hours as needed for Wheezing. Rescue, Disp: 18 g, Rfl: 5    amiodarone (PACERONE) 200 MG Tab, Take by mouth once daily., Disp: , Rfl:     ascorbic acid, vitamin C, (VITAMIN C) 1000 MG tablet, Take 1,000 mg by mouth once daily., Disp: , Rfl:     aspirin (ECOTRIN) 81 MG EC tablet, Take 81 mg by mouth once daily., Disp: , Rfl:     atorvastatin (LIPITOR) 80 MG tablet, Take 1 tablet by mouth once daily., Disp: , Rfl:     cilostazoL (PLETAL) 100 MG Tab, Take 100 mg by mouth 2 (two) times daily., Disp: , Rfl:     clopidogreL (PLAVIX) 75 mg tablet, Take 1 tablet (75 mg total) by mouth once daily., Disp: 90 tablet, Rfl: 1    ezetimibe (ZETIA) 10 mg tablet, Take 10 mg by mouth once daily., Disp: , Rfl:     ferrous sulfate (FEOSOL) 325 mg (65 mg iron) Tab tablet, Take 1 tablet (325 mg total) by mouth 3 (three) times daily., Disp: 90 tablet, Rfl: 1    furosemide (LASIX) 40 MG tablet, Take 40 mg by mouth once daily., Disp: , Rfl:     hydrALAZINE (APRESOLINE) 50 MG tablet, Take 1 tablet (50 mg total) by mouth 3 (three) times daily., Disp: 90 tablet, Rfl: 1    isosorbide mononitrate (IMDUR) 30 MG 24 hr tablet, Take 15 mg by mouth., Disp: , Rfl:     latanoprost 0.005 % ophthalmic solution, Place 1 drop into both eyes every  evening., Disp: , Rfl:     losartan (COZAAR) 50 MG tablet, Take 2 tablets (100 mg total) by mouth once daily., Disp: 90 tablet, Rfl: 1    metoprolol succinate (TOPROL-XL) 50 MG 24 hr tablet, Take 1 tablet by mouth once daily., Disp: , Rfl:     naproxen (NAPROSYN) 500 MG tablet, TAKE ONE TABLET BY MOUTH TWICE DAILY AS NEEDED, Disp: 20 tablet, Rfl: 4    nitroGLYCERIN (NITROSTAT) 0.4 MG SL tablet, Place under the tongue., Disp: , Rfl:     tamsulosin (FLOMAX) 0.4 mg Cap, Take 1 capsule (0.4 mg total) by mouth once daily., Disp: 90 capsule, Rfl: 3    timolol maleate 0.5% (TIMOPTIC) 0.5 % Drop, Place into both eyes., Disp: , Rfl:     valsartan (DIOVAN) 320 MG tablet, Take 320 mg by mouth., Disp: , Rfl:     doxycycline (VIBRAMYCIN) 100 MG Cap, Take 1 capsule (100 mg total) by mouth 2 (two) times a day., Disp: 20 capsule, Rfl: 0    HYDROcodone-acetaminophen (NORCO) 7.5-325 mg per tablet, Take 1 tablet by mouth daily as needed for Pain., Disp: 12 tablet, Rfl: 0    propranoloL (INDERAL) 20 MG tablet, Take 1 tablet (20 mg total) by mouth 2 (two) times daily. (Patient not taking: Reported on 4/3/2023), Disp: 90 tablet, Rfl: 1           Review of Systems   Constitutional:  Negative for appetite change and fatigue.   HENT:  Negative for nasal congestion and rhinorrhea.    Eyes:  Negative for redness and visual disturbance.   Respiratory:  Negative for cough and shortness of breath.    Cardiovascular:  Negative for chest pain and leg swelling.   Gastrointestinal:  Negative for abdominal pain, constipation and diarrhea.   Endocrine: Negative for polyuria.   Genitourinary:  Negative for difficulty urinating, dysuria and erectile dysfunction.   Musculoskeletal:  Negative for arthralgias and myalgias.   Integumentary:  Negative for rash and mole/lesion.   All other systems reviewed and are negative.             Vitals:    06/19/23 0909   BP: 139/66   BP Location: Right arm   Patient Position: Sitting   BP Method: Large (Automatic)  "  Pulse: 60   Resp: 17   Temp: 97.7 °F (36.5 °C)   TempSrc: Temporal   SpO2: 96%   Weight: 91.8 kg (202 lb 6.4 oz)   Height: 5' 11" (1.803 m)        Physical Exam  Vitals and nursing note reviewed.   Constitutional:       Appearance: Normal appearance.   Cardiovascular:      Rate and Rhythm: Normal rate and regular rhythm.      Pulses: Normal pulses.      Heart sounds: Normal heart sounds.   Pulmonary:      Effort: Pulmonary effort is normal.      Breath sounds: Normal breath sounds.   Abdominal:      General: Abdomen is flat. Bowel sounds are normal.      Palpations: Abdomen is soft.   Musculoskeletal:         General: Normal range of motion.   Skin:     General: Skin is warm and dry.   Neurological:      General: No focal deficit present.      Mental Status: He is alert and oriented to person, place, and time. Mental status is at baseline.         Last Labs:     No visits with results within 1 Month(s) from this visit.   Latest known visit with results is:   Office Visit on 04/03/2023   Component Date Value    Triglycerides 04/03/2023 204 (H)     Cholesterol 04/03/2023 106     HDL Cholesterol 04/03/2023 38 (L)     Cholesterol/HDL Ratio (R* 04/03/2023 2.8     Non-HDL 04/03/2023 68     LDL Calculated 04/03/2023 27     VLDL 04/03/2023 41     PSA Total 04/03/2023 1.880        Last Imaging:  EGD w Dilation  Narrative: Procedure Date  5/16/22    Impression  Overall   Impression:  Mucosa of the esophagus is grossly normal. Distal   esophageal biopsies were obtained and the  esophagus was dilated with a   48F Cronin. A 2cm hiatus hernia is noted. Mild erythema was present in   the antrum and biopsies were obtained. Mucosa of the duodenum is normal.    Recommendation  Await pathology results; avoid nsaids; repeat dilation prn.    Indication  Hoarseness, blood in stool, esophageal dysphagia.    Providers  Attending: Johnathon Zambrano MD  Fellow:    Other Staff: Neelima Arevalo RN, Sridhar Barrera RN, Hector MACEDO"   LULU Gil    Medications  Moderate sedation administered by anesthesia staff - See anesthesia   record.    Preprocedure  A history and physical has been performed, and patient medication   allergies have been reviewed. The patient's tolerance of previous   anesthesia has been reviewed. The risks and benefits of the procedure and   the sedation options and risks were discussed with the patient. All   questions were answered and informed consent obtained.    ASA Score: ASA 2 - Patient with mild systemic disease with no functional   limitations  Mallampati Airway Score: II (hard and soft palate, upper portion of   tonsils anduvula visible)    Details of the Procedure  The patient underwent monitored anesthesia care, which was administered by   an anesthesia professional. The patient's blood pressure, heart rate,   level of consciousness, oxygen, respirations, ECG and ETCO2 were monitored   throughout the procedure. The scope was introduced through the mouth and   advanced to the third part of the duodenum. Retroflexion was performed in   the cardia. The patient's estimated blood loss was minimal (<5 mL). The   procedure was not difficult. The patient tolerated the procedure well.   There were no apparent complications.     Scope: Gastroscope  Scope Serial: [id number]    Events  Procedure Events   Event Event Time     Procedure Events   Event Event Time   SCOPE IN 5/16/2022 11:44 AM   SCOPE OUT 5/16/2022 11:48 AM     Findings  2 cm hiatal hernia  Dilated in the esophagus with Cronin dilator         **Labs and x-rays personally reviewed by me    ** reviewed      Objective:        Assessment:       1. Benign prostatic hyperplasia, unspecified whether lower urinary tract symptoms present        2. Scrotal pain             Plan:         [unfilled]

## 2023-07-10 ENCOUNTER — OFFICE VISIT (OUTPATIENT)
Dept: FAMILY MEDICINE | Facility: CLINIC | Age: 73
End: 2023-07-10
Payer: MEDICARE

## 2023-07-10 VITALS
DIASTOLIC BLOOD PRESSURE: 64 MMHG | TEMPERATURE: 98 F | SYSTOLIC BLOOD PRESSURE: 112 MMHG | OXYGEN SATURATION: 96 % | HEART RATE: 71 BPM | WEIGHT: 199.38 LBS | RESPIRATION RATE: 17 BRPM | BODY MASS INDEX: 27.91 KG/M2 | HEIGHT: 71 IN

## 2023-07-10 DIAGNOSIS — N45.1 EPIDIDYMITIS: Primary | ICD-10-CM

## 2023-07-10 PROCEDURE — 1159F PR MEDICATION LIST DOCUMENTED IN MEDICAL RECORD: ICD-10-PCS | Mod: ,,, | Performed by: INTERNAL MEDICINE

## 2023-07-10 PROCEDURE — 3008F BODY MASS INDEX DOCD: CPT | Mod: ,,, | Performed by: INTERNAL MEDICINE

## 2023-07-10 PROCEDURE — 3288F FALL RISK ASSESSMENT DOCD: CPT | Mod: ,,, | Performed by: INTERNAL MEDICINE

## 2023-07-10 PROCEDURE — 3078F DIAST BP <80 MM HG: CPT | Mod: ,,, | Performed by: INTERNAL MEDICINE

## 2023-07-10 PROCEDURE — 3078F PR MOST RECENT DIASTOLIC BLOOD PRESSURE < 80 MM HG: ICD-10-PCS | Mod: ,,, | Performed by: INTERNAL MEDICINE

## 2023-07-10 PROCEDURE — 4010F PR ACE/ARB THEARPY RXD/TAKEN: ICD-10-PCS | Mod: ,,, | Performed by: INTERNAL MEDICINE

## 2023-07-10 PROCEDURE — 1126F PR PAIN SEVERITY QUANTIFIED, NO PAIN PRESENT: ICD-10-PCS | Mod: ,,, | Performed by: INTERNAL MEDICINE

## 2023-07-10 PROCEDURE — 3074F SYST BP LT 130 MM HG: CPT | Mod: ,,, | Performed by: INTERNAL MEDICINE

## 2023-07-10 PROCEDURE — 4010F ACE/ARB THERAPY RXD/TAKEN: CPT | Mod: ,,, | Performed by: INTERNAL MEDICINE

## 2023-07-10 PROCEDURE — 1160F PR REVIEW ALL MEDS BY PRESCRIBER/CLIN PHARMACIST DOCUMENTED: ICD-10-PCS | Mod: ,,, | Performed by: INTERNAL MEDICINE

## 2023-07-10 PROCEDURE — 1160F RVW MEDS BY RX/DR IN RCRD: CPT | Mod: ,,, | Performed by: INTERNAL MEDICINE

## 2023-07-10 PROCEDURE — 3074F PR MOST RECENT SYSTOLIC BLOOD PRESSURE < 130 MM HG: ICD-10-PCS | Mod: ,,, | Performed by: INTERNAL MEDICINE

## 2023-07-10 PROCEDURE — 99212 OFFICE O/P EST SF 10 MIN: CPT | Mod: ,,, | Performed by: INTERNAL MEDICINE

## 2023-07-10 PROCEDURE — 99212 PR OFFICE/OUTPT VISIT, EST, LEVL II, 10-19 MIN: ICD-10-PCS | Mod: ,,, | Performed by: INTERNAL MEDICINE

## 2023-07-10 PROCEDURE — 1159F MED LIST DOCD IN RCRD: CPT | Mod: ,,, | Performed by: INTERNAL MEDICINE

## 2023-07-10 PROCEDURE — 3008F PR BODY MASS INDEX (BMI) DOCUMENTED: ICD-10-PCS | Mod: ,,, | Performed by: INTERNAL MEDICINE

## 2023-07-10 PROCEDURE — 1101F PR PT FALLS ASSESS DOC 0-1 FALLS W/OUT INJ PAST YR: ICD-10-PCS | Mod: ,,, | Performed by: INTERNAL MEDICINE

## 2023-07-10 PROCEDURE — 3288F PR FALLS RISK ASSESSMENT DOCUMENTED: ICD-10-PCS | Mod: ,,, | Performed by: INTERNAL MEDICINE

## 2023-07-10 PROCEDURE — 1101F PT FALLS ASSESS-DOCD LE1/YR: CPT | Mod: ,,, | Performed by: INTERNAL MEDICINE

## 2023-07-10 PROCEDURE — 1126F AMNT PAIN NOTED NONE PRSNT: CPT | Mod: ,,, | Performed by: INTERNAL MEDICINE

## 2023-07-12 PROBLEM — N45.1 EPIDIDYMITIS: Status: ACTIVE | Noted: 2023-07-12

## 2023-07-12 NOTE — PROGRESS NOTES
Subjective:       Patient ID: Sami Kerns is a 73 y.o. male.    Chief Complaint: Benign Prostatic Hypertrophy  Patient with a known history of BPH.  Patient is coming in with discomfort in the testicle that has improved patient recently diagnosed with epididymitis this was treated with p.o. antibiotics approximately 3 weeks ago patient is much improved.  Benign Prostatic Hypertrophy  Pertinent negatives include no dysuria.   .    Current Medications:    Current Outpatient Medications:     albuterol (VENTOLIN HFA) 90 mcg/actuation inhaler, Inhale 2 puffs into the lungs every 6 (six) hours as needed for Wheezing. Rescue, Disp: 18 g, Rfl: 5    amiodarone (PACERONE) 200 MG Tab, Take by mouth once daily., Disp: , Rfl:     ascorbic acid, vitamin C, (VITAMIN C) 1000 MG tablet, Take 1,000 mg by mouth once daily., Disp: , Rfl:     aspirin (ECOTRIN) 81 MG EC tablet, Take 81 mg by mouth once daily., Disp: , Rfl:     atorvastatin (LIPITOR) 80 MG tablet, Take 1 tablet by mouth once daily., Disp: , Rfl:     cilostazoL (PLETAL) 100 MG Tab, Take 100 mg by mouth 2 (two) times daily., Disp: , Rfl:     clopidogreL (PLAVIX) 75 mg tablet, Take 1 tablet (75 mg total) by mouth once daily., Disp: 90 tablet, Rfl: 1    doxycycline (VIBRAMYCIN) 100 MG Cap, Take 1 capsule (100 mg total) by mouth 2 (two) times a day., Disp: 20 capsule, Rfl: 0    ezetimibe (ZETIA) 10 mg tablet, Take 10 mg by mouth once daily., Disp: , Rfl:     ferrous sulfate (FEOSOL) 325 mg (65 mg iron) Tab tablet, Take 1 tablet (325 mg total) by mouth 3 (three) times daily., Disp: 90 tablet, Rfl: 1    furosemide (LASIX) 40 MG tablet, Take 40 mg by mouth once daily., Disp: , Rfl:     hydrALAZINE (APRESOLINE) 50 MG tablet, Take 1 tablet (50 mg total) by mouth 3 (three) times daily., Disp: 90 tablet, Rfl: 1    HYDROcodone-acetaminophen (NORCO) 7.5-325 mg per tablet, Take 1 tablet by mouth daily as needed for Pain., Disp: 12 tablet, Rfl: 0    isosorbide mononitrate (IMDUR) 30  "MG 24 hr tablet, Take 15 mg by mouth., Disp: , Rfl:     latanoprost 0.005 % ophthalmic solution, Place 1 drop into both eyes every evening., Disp: , Rfl:     losartan (COZAAR) 50 MG tablet, Take 2 tablets (100 mg total) by mouth once daily., Disp: 90 tablet, Rfl: 1    metoprolol succinate (TOPROL-XL) 50 MG 24 hr tablet, Take 1 tablet by mouth once daily., Disp: , Rfl:     naproxen (NAPROSYN) 500 MG tablet, TAKE ONE TABLET BY MOUTH TWICE DAILY AS NEEDED, Disp: 20 tablet, Rfl: 4    nitroGLYCERIN (NITROSTAT) 0.4 MG SL tablet, Place under the tongue., Disp: , Rfl:     tamsulosin (FLOMAX) 0.4 mg Cap, Take 1 capsule (0.4 mg total) by mouth once daily., Disp: 90 capsule, Rfl: 3    timolol maleate 0.5% (TIMOPTIC) 0.5 % Drop, Place into both eyes., Disp: , Rfl:     valsartan (DIOVAN) 320 MG tablet, Take 320 mg by mouth., Disp: , Rfl:     propranoloL (INDERAL) 20 MG tablet, Take 1 tablet (20 mg total) by mouth 2 (two) times daily. (Patient not taking: Reported on 4/3/2023), Disp: 90 tablet, Rfl: 1           Review of Systems   Constitutional:  Negative for appetite change and fatigue.   HENT:  Negative for nasal congestion and rhinorrhea.    Eyes:  Negative for redness and visual disturbance.   Respiratory:  Negative for cough and shortness of breath.    Cardiovascular:  Negative for chest pain and leg swelling.   Gastrointestinal:  Negative for abdominal pain, constipation and diarrhea.   Endocrine: Negative for polyuria.   Genitourinary:  Negative for difficulty urinating, dysuria and erectile dysfunction.   Musculoskeletal:  Negative for arthralgias and myalgias.   Integumentary:  Negative for rash and mole/lesion.   All other systems reviewed and are negative.             Vitals:    07/10/23 0839   BP: 112/64   BP Location: Right arm   Patient Position: Sitting   BP Method: Large (Automatic)   Pulse: 71   Resp: 17   Temp: 97.6 °F (36.4 °C)   TempSrc: Temporal   SpO2: 96%   Weight: 90.4 kg (199 lb 6.4 oz)   Height: 5' 11" " (1.803 m)        Physical Exam  Vitals and nursing note reviewed.   Constitutional:       Appearance: Normal appearance.   Cardiovascular:      Rate and Rhythm: Normal rate and regular rhythm.      Pulses: Normal pulses.      Heart sounds: Normal heart sounds.   Pulmonary:      Effort: Pulmonary effort is normal.      Breath sounds: Normal breath sounds.   Abdominal:      General: Abdomen is flat. Bowel sounds are normal.      Palpations: Abdomen is soft.   Musculoskeletal:         General: Normal range of motion.   Skin:     General: Skin is warm and dry.   Neurological:      General: No focal deficit present.      Mental Status: He is alert and oriented to person, place, and time. Mental status is at baseline.         Last Labs:     No visits with results within 1 Month(s) from this visit.   Latest known visit with results is:   Office Visit on 04/03/2023   Component Date Value    Triglycerides 04/03/2023 204 (H)     Cholesterol 04/03/2023 106     HDL Cholesterol 04/03/2023 38 (L)     Cholesterol/HDL Ratio (R* 04/03/2023 2.8     Non-HDL 04/03/2023 68     LDL Calculated 04/03/2023 27     VLDL 04/03/2023 41     PSA Total 04/03/2023 1.880        Last Imaging:  EGD w Dilation  Narrative: Procedure Date  5/16/22    Impression  Overall   Impression:  Mucosa of the esophagus is grossly normal. Distal   esophageal biopsies were obtained and the  esophagus was dilated with a   48F Cronin. A 2cm hiatus hernia is noted. Mild erythema was present in   the antrum and biopsies were obtained. Mucosa of the duodenum is normal.    Recommendation  Await pathology results; avoid nsaids; repeat dilation prn.    Indication  Hoarseness, blood in stool, esophageal dysphagia.    Providers  Attending: Johnathon Zambrano MD  Fellow:    Other Staff: Neelima Arevalo, MELISSA, Sridhar Barrera RN, Hector Gil CRNA    Medications  Moderate sedation administered by anesthesia staff - See anesthesia   record.    Preprocedure  A history  and physical has been performed, and patient medication   allergies have been reviewed. The patient's tolerance of previous   anesthesia has been reviewed. The risks and benefits of the procedure and   the sedation options and risks were discussed with the patient. All   questions were answered and informed consent obtained.    ASA Score: ASA 2 - Patient with mild systemic disease with no functional   limitations  Mallampati Airway Score: II (hard and soft palate, upper portion of   tonsils anduvula visible)    Details of the Procedure  The patient underwent monitored anesthesia care, which was administered by   an anesthesia professional. The patient's blood pressure, heart rate,   level of consciousness, oxygen, respirations, ECG and ETCO2 were monitored   throughout the procedure. The scope was introduced through the mouth and   advanced to the third part of the duodenum. Retroflexion was performed in   the cardia. The patient's estimated blood loss was minimal (<5 mL). The   procedure was not difficult. The patient tolerated the procedure well.   There were no apparent complications.     Scope: Gastroscope  Scope Serial: [id number]    Events  Procedure Events   Event Event Time     Procedure Events   Event Event Time   SCOPE IN 5/16/2022 11:44 AM   SCOPE OUT 5/16/2022 11:48 AM     Findings  2 cm hiatal hernia  Dilated in the esophagus with Cronin dilator         **Labs and x-rays personally reviewed by me    ** reviewed      Objective:        Assessment:       1. Epididymitis             Plan:         [unfilled]

## 2023-07-19 ENCOUNTER — OFFICE VISIT (OUTPATIENT)
Dept: GASTROENTEROLOGY | Facility: CLINIC | Age: 73
End: 2023-07-19
Payer: MEDICARE

## 2023-07-19 VITALS
SYSTOLIC BLOOD PRESSURE: 98 MMHG | RESPIRATION RATE: 16 BRPM | HEART RATE: 60 BPM | BODY MASS INDEX: 27.91 KG/M2 | OXYGEN SATURATION: 97 % | DIASTOLIC BLOOD PRESSURE: 42 MMHG | HEIGHT: 71 IN | WEIGHT: 199.38 LBS

## 2023-07-19 DIAGNOSIS — D50.0 IRON DEFICIENCY ANEMIA DUE TO CHRONIC BLOOD LOSS: Primary | ICD-10-CM

## 2023-07-19 DIAGNOSIS — N28.9 RENAL INSUFFICIENCY: ICD-10-CM

## 2023-07-19 DIAGNOSIS — N28.9 RENAL INSUFFICIENCY: Primary | ICD-10-CM

## 2023-07-19 DIAGNOSIS — R74.8 ELEVATED LIVER ENZYMES: Primary | ICD-10-CM

## 2023-07-19 DIAGNOSIS — R74.8 ELEVATED LIVER ENZYMES: ICD-10-CM

## 2023-07-19 PROCEDURE — 3078F DIAST BP <80 MM HG: CPT | Mod: CPTII,,, | Performed by: NURSE PRACTITIONER

## 2023-07-19 PROCEDURE — 1159F MED LIST DOCD IN RCRD: CPT | Mod: CPTII,,, | Performed by: NURSE PRACTITIONER

## 2023-07-19 PROCEDURE — 4010F PR ACE/ARB THEARPY RXD/TAKEN: ICD-10-PCS | Mod: CPTII,,, | Performed by: NURSE PRACTITIONER

## 2023-07-19 PROCEDURE — 1101F PT FALLS ASSESS-DOCD LE1/YR: CPT | Mod: CPTII,,, | Performed by: NURSE PRACTITIONER

## 2023-07-19 PROCEDURE — 3288F FALL RISK ASSESSMENT DOCD: CPT | Mod: CPTII,,, | Performed by: NURSE PRACTITIONER

## 2023-07-19 PROCEDURE — 99215 OFFICE O/P EST HI 40 MIN: CPT | Mod: PBBFAC | Performed by: NURSE PRACTITIONER

## 2023-07-19 PROCEDURE — 1126F PR PAIN SEVERITY QUANTIFIED, NO PAIN PRESENT: ICD-10-PCS | Mod: CPTII,,, | Performed by: NURSE PRACTITIONER

## 2023-07-19 PROCEDURE — 1126F AMNT PAIN NOTED NONE PRSNT: CPT | Mod: CPTII,,, | Performed by: NURSE PRACTITIONER

## 2023-07-19 PROCEDURE — 1159F PR MEDICATION LIST DOCUMENTED IN MEDICAL RECORD: ICD-10-PCS | Mod: CPTII,,, | Performed by: NURSE PRACTITIONER

## 2023-07-19 PROCEDURE — 4010F ACE/ARB THERAPY RXD/TAKEN: CPT | Mod: CPTII,,, | Performed by: NURSE PRACTITIONER

## 2023-07-19 PROCEDURE — 3288F PR FALLS RISK ASSESSMENT DOCUMENTED: ICD-10-PCS | Mod: CPTII,,, | Performed by: NURSE PRACTITIONER

## 2023-07-19 PROCEDURE — 3008F BODY MASS INDEX DOCD: CPT | Mod: CPTII,,, | Performed by: NURSE PRACTITIONER

## 2023-07-19 PROCEDURE — 99214 OFFICE O/P EST MOD 30 MIN: CPT | Mod: S$PBB,,, | Performed by: NURSE PRACTITIONER

## 2023-07-19 PROCEDURE — 3008F PR BODY MASS INDEX (BMI) DOCUMENTED: ICD-10-PCS | Mod: CPTII,,, | Performed by: NURSE PRACTITIONER

## 2023-07-19 PROCEDURE — 3078F PR MOST RECENT DIASTOLIC BLOOD PRESSURE < 80 MM HG: ICD-10-PCS | Mod: CPTII,,, | Performed by: NURSE PRACTITIONER

## 2023-07-19 PROCEDURE — 3074F PR MOST RECENT SYSTOLIC BLOOD PRESSURE < 130 MM HG: ICD-10-PCS | Mod: CPTII,,, | Performed by: NURSE PRACTITIONER

## 2023-07-19 PROCEDURE — 1101F PR PT FALLS ASSESS DOC 0-1 FALLS W/OUT INJ PAST YR: ICD-10-PCS | Mod: CPTII,,, | Performed by: NURSE PRACTITIONER

## 2023-07-19 PROCEDURE — 99214 PR OFFICE/OUTPT VISIT, EST, LEVL IV, 30-39 MIN: ICD-10-PCS | Mod: S$PBB,,, | Performed by: NURSE PRACTITIONER

## 2023-07-19 PROCEDURE — 3074F SYST BP LT 130 MM HG: CPT | Mod: CPTII,,, | Performed by: NURSE PRACTITIONER

## 2023-07-19 NOTE — PROGRESS NOTES
Sami Kerns is a 73 y.o. male here for Follow-up        PCP: Michael Weems  Referring Provider: Pauline Schaeffer, Lianap  [address] [facility] [place] [id number]     HPI:  Presents for six-month follow-up with a history of iron-deficiency anemia.He is taking oral iron. Denies hematochezia or melena. 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  States that he is scheduled for a bone marrow biopsy at [facility] but is unsure of the exact date.  EGD/Dil 5/16/22, 2 cm hiatal hernia.Denies dysphagia. Last colonoscopy 11/29/21, recommendation to repeat in 3 years. Appetite is good. No weight loss. No report of back pain. Chronic hoarseness due to previous polyp on vocal cord.  Reports that he was in the hospital at [facility] a couple of months ago due to chest pain.  States that he is followed by Dr. Melchor.  He is on multiple cardiac medications. Blood pressure is 98/42 today.  He is asymptomatic.  Records from [facility] are not available for review.  Reports that he did have some loose stool this morning, thinks that he ate something that did not agree with him.  No nausea or vomiting.  No fever.  He was previously referred to Nephrology but states that he has not been evaluated.  In January at the time of referral.  Creatinine was 2.34 and GFR was 29.  Patient agrees to be referred to nephrology.  Encouraged importance of follow-up and keeping this appointment.         Follow-up  Pertinent negatives include no change in bowel habit, chest pain, coughing, fatigue, fever, nausea or vomiting.       ROS:  Review of Systems   Constitutional:  Negative for activity change, appetite change, fatigue, fever and unexpected weight change.   HENT:  Negative for trouble swallowing.    Respiratory:  Negative for cough.    Cardiovascular:  Negative for chest pain.   Gastrointestinal:  Negative for abdominal distention, blood in stool, change in bowel habit, constipation, diarrhea, nausea,  vomiting, reflux and change in bowel habit.   Musculoskeletal:  Negative for gait problem.   Integumentary:  Negative for color change.   Neurological:  Negative for light-headedness.   Psychiatric/Behavioral:  Negative for sleep disturbance. The patient is not nervous/anxious.         PMHX:  has a past medical history of Adenomatous polyp of ascending colon (11/29/2021), Adenomatous polyp of descending colon (11/29/2021), Adenomatous polyp of transverse colon (11/29/2021), Anticoagulant long-term use, Blood in stool (11/29/2021), CAD (coronary artery disease), CHF (congestive heart failure), COPD (chronic obstructive pulmonary disease), GERD (gastroesophageal reflux disease), Gout, HH (hiatus hernia) (05/16/2022), HTN (hypertension), Hyperlipidemia, Iron deficiency anemia due to chronic blood loss (11/29/2021), Orchitis (01/20/2022), Osteoarthritis, Polyp of splenic flexure of colon (11/29/2021), Screening for colon cancer (11/29/2021), Urinary incontinence (01/20/2022), and Vocal cord polyps.    PSHX:  has a past surgical history that includes Coronary Artery Bypass Graft (CABG); Tonsillectomy (N/A); Direct laryngoscopy (Bilateral, 05/10/2022); Vocal fold lesion excision (Bilateral, 05/10/2022); Direct laryngoscopy (N/A, 11/11/2022); and Vocal fold lesion excision (N/A, 11/11/2022).    PFHX: family history includes Hypertension in his mother.    PSlHX:  reports that he has been smoking cigarettes. He has a 2.80 pack-year smoking history. He has been exposed to tobacco smoke. He has never used smokeless tobacco. He reports current alcohol use. He reports that he does not use drugs.        Review of patient's allergies indicates:  No Known Allergies    Medication List with Changes/Refills   Current Medications    ALBUTEROL (VENTOLIN HFA) 90 MCG/ACTUATION INHALER    Inhale 2 puffs into the lungs every 6 (six) hours as needed for Wheezing. Rescue    AMIODARONE (PACERONE) 200 MG TAB    Take by mouth once daily.     "ASCORBIC ACID, VITAMIN C, (VITAMIN C) 1000 MG TABLET    Take 1,000 mg by mouth once daily.    ASPIRIN (ECOTRIN) 81 MG EC TABLET    Take 81 mg by mouth once daily.    ATORVASTATIN (LIPITOR) 80 MG TABLET    Take 1 tablet by mouth once daily.    CILOSTAZOL (PLETAL) 100 MG TAB    Take 100 mg by mouth 2 (two) times daily.    CLOPIDOGREL (PLAVIX) 75 MG TABLET    Take 1 tablet (75 mg total) by mouth once daily.    DOXYCYCLINE (VIBRAMYCIN) 100 MG CAP    Take 1 capsule (100 mg total) by mouth 2 (two) times a day.    EZETIMIBE (ZETIA) 10 MG TABLET    Take 10 mg by mouth once daily.    FERROUS SULFATE (FEOSOL) 325 MG (65 MG IRON) TAB TABLET    Take 1 tablet (325 mg total) by mouth 3 (three) times daily.    FUROSEMIDE (LASIX) 40 MG TABLET    Take 40 mg by mouth once daily.    HYDRALAZINE (APRESOLINE) 50 MG TABLET    Take 1 tablet (50 mg total) by mouth 3 (three) times daily.    HYDROCODONE-ACETAMINOPHEN (NORCO) 7.5-325 MG PER TABLET    Take 1 tablet by mouth daily as needed for Pain.    ISOSORBIDE MONONITRATE (IMDUR) 30 MG 24 HR TABLET    Take 15 mg by mouth.    LATANOPROST 0.005 % OPHTHALMIC SOLUTION    Place 1 drop into both eyes every evening.    LOSARTAN (COZAAR) 50 MG TABLET    Take 2 tablets (100 mg total) by mouth once daily.    METOPROLOL SUCCINATE (TOPROL-XL) 50 MG 24 HR TABLET    Take 1 tablet by mouth once daily.    NAPROXEN (NAPROSYN) 500 MG TABLET    TAKE ONE TABLET BY MOUTH TWICE DAILY AS NEEDED    NITROGLYCERIN (NITROSTAT) 0.4 MG SL TABLET    Place under the tongue.    PROPRANOLOL (INDERAL) 20 MG TABLET    Take 1 tablet (20 mg total) by mouth 2 (two) times daily.    TAMSULOSIN (FLOMAX) 0.4 MG CAP    Take 1 capsule (0.4 mg total) by mouth once daily.    TIMOLOL MALEATE 0.5% (TIMOPTIC) 0.5 % DROP    Place into both eyes.    VALSARTAN (DIOVAN) 320 MG TABLET    Take 320 mg by mouth.        Objective Findings:  Vital Signs:  BP (!) 98/42   Pulse 60   Resp 16   Ht 5' 11" (1.803 m)   Wt 90.4 kg (199 lb 6.4 oz)   " SpO2 97%   BMI 27.81 kg/m²  Body mass index is 27.81 kg/m².    Physical Exam:  Physical Exam  Vitals and nursing note reviewed.   Constitutional:       General: He is not in acute distress.     Appearance: Normal appearance.   HENT:      Mouth/Throat:      Mouth: Mucous membranes are moist.   Cardiovascular:      Rate and Rhythm: Normal rate.   Pulmonary:      Effort: Pulmonary effort is normal.      Breath sounds: No wheezing, rhonchi or rales.   Abdominal:      General: Bowel sounds are normal. There is no distension.      Palpations: Abdomen is soft. There is no mass.      Tenderness: There is no abdominal tenderness. There is no guarding.   Musculoskeletal:      Right lower leg: No edema.      Left lower leg: No edema.   Skin:     General: Skin is warm and dry.      Coloration: Skin is not jaundiced or pale.   Neurological:      Mental Status: He is alert and oriented to person, place, and time.   Psychiatric:         Mood and Affect: Mood normal.        Labs:  Lab Results   Component Value Date    WBC 5.11 01/19/2023    HGB 12.1 (L) 01/19/2023    HCT 35.4 (L) 01/19/2023    MCV 81.8 01/19/2023    RDW 15.9 (H) 01/19/2023     01/19/2023    LYMPH 29.5 01/19/2023    LYMPH 1.51 01/19/2023    MONO 14.5 (H) 01/19/2023    EOS 0.16 01/19/2023    BASO 0.04 01/19/2023     Lab Results   Component Value Date     01/19/2023    K 4.5 01/19/2023     (H) 01/19/2023    CO2 23 01/19/2023     01/19/2023    BUN 32 (H) 01/19/2023    CREATININE 2.34 (H) 01/19/2023    CALCIUM 9.0 01/19/2023    PROT 7.9 01/19/2023    ALBUMIN 3.9 01/19/2023    BILITOT 0.5 01/19/2023    ALKPHOS 92 01/19/2023    AST 32 01/19/2023    ALT 43 01/19/2023         Imaging: No results found.      Assessment:  Sami Kerns is a 73 y.o. male here with:  1. Iron deficiency anemia due to chronic blood loss    2. Renal insufficiency          Recommendations:  1. CBC, CMP, iron studies  2. Keep appointment with Dr. Arenas as directed  3.  Refer to Nephrology, was referred in January but states he did not have appointment    Follow up in about 6 months (around 1/19/2024).      Order summary:  Orders Placed This Encounter    CBC Auto Differential    Comprehensive Metabolic Panel    Iron and TIBC    Ferritin    Ambulatory referral/consult to Nephrology       Thank you for allowing me to participate in the care of Sami Kerns.      JAYNE SuC

## 2023-07-20 ENCOUNTER — TELEPHONE (OUTPATIENT)
Dept: GASTROENTEROLOGY | Facility: CLINIC | Age: 73
End: 2023-07-20
Payer: MEDICARE

## 2023-07-20 NOTE — TELEPHONE ENCOUNTER
Results and recommendations called to patient. Scheduled US abd for 7/31/23 at 8:30am. Verbalized understanding.            ----- Message from SARA White sent at 7/19/2023  5:12 PM CDT -----  Repeat hepatic function in 3 months

## 2023-07-26 ENCOUNTER — OFFICE VISIT (OUTPATIENT)
Dept: OTOLARYNGOLOGY | Facility: CLINIC | Age: 73
End: 2023-07-26
Payer: MEDICARE

## 2023-07-26 VITALS — WEIGHT: 199 LBS | BODY MASS INDEX: 27.86 KG/M2 | HEIGHT: 71 IN

## 2023-07-26 DIAGNOSIS — R49.0 HOARSENESS: ICD-10-CM

## 2023-07-26 DIAGNOSIS — R13.19 OTHER DYSPHAGIA: ICD-10-CM

## 2023-07-26 DIAGNOSIS — J38.1 VOCAL CORD POLYPS: Primary | ICD-10-CM

## 2023-07-26 PROCEDURE — 31575 PR LARYNGOSCOPY, FLEXIBLE; DIAGNOSTIC: ICD-10-PCS | Mod: S$PBB,,, | Performed by: OTOLARYNGOLOGY

## 2023-07-26 PROCEDURE — 99214 OFFICE O/P EST MOD 30 MIN: CPT | Mod: 25,S$PBB,, | Performed by: OTOLARYNGOLOGY

## 2023-07-26 PROCEDURE — 99215 OFFICE O/P EST HI 40 MIN: CPT | Mod: PBBFAC | Performed by: OTOLARYNGOLOGY

## 2023-07-26 PROCEDURE — 3008F PR BODY MASS INDEX (BMI) DOCUMENTED: ICD-10-PCS | Mod: CPTII,,, | Performed by: OTOLARYNGOLOGY

## 2023-07-26 PROCEDURE — 4010F ACE/ARB THERAPY RXD/TAKEN: CPT | Mod: CPTII,,, | Performed by: OTOLARYNGOLOGY

## 2023-07-26 PROCEDURE — 1159F PR MEDICATION LIST DOCUMENTED IN MEDICAL RECORD: ICD-10-PCS | Mod: CPTII,,, | Performed by: OTOLARYNGOLOGY

## 2023-07-26 PROCEDURE — 3008F BODY MASS INDEX DOCD: CPT | Mod: CPTII,,, | Performed by: OTOLARYNGOLOGY

## 2023-07-26 PROCEDURE — 1159F MED LIST DOCD IN RCRD: CPT | Mod: CPTII,,, | Performed by: OTOLARYNGOLOGY

## 2023-07-26 PROCEDURE — 99214 PR OFFICE/OUTPT VISIT, EST, LEVL IV, 30-39 MIN: ICD-10-PCS | Mod: 25,S$PBB,, | Performed by: OTOLARYNGOLOGY

## 2023-07-26 PROCEDURE — 31575 DIAGNOSTIC LARYNGOSCOPY: CPT | Mod: PBBFAC | Performed by: OTOLARYNGOLOGY

## 2023-07-26 PROCEDURE — 31575 DIAGNOSTIC LARYNGOSCOPY: CPT | Mod: S$PBB,,, | Performed by: OTOLARYNGOLOGY

## 2023-07-26 PROCEDURE — 4010F PR ACE/ARB THEARPY RXD/TAKEN: ICD-10-PCS | Mod: CPTII,,, | Performed by: OTOLARYNGOLOGY

## 2023-07-26 NOTE — H&P (VIEW-ONLY)
Subjective:       Patient ID: Sami Kerns is a 73 y.o. male.    Chief Complaint: No chief complaint on file.  Still hoarse hard to swallow   HPI  Review of Systems   HENT:  Positive for trouble swallowing and voice change.    All other systems reviewed and are negative.    Objective:      Physical Exam  General: NAD Hoarse   Head: Normocephalic, atraumatic, no facial asymmetry/normal strength,  Ears: Both auricules normal in appearance, w/o deformities tympanic membranes normal external auditory canals normal  Nose: External nose w/o deformities normal turbinates no drainage or inflammation  Oral Cavity: Lips, gums, floor of mouth, tongue hard palate, and buccal mucosa without mass/lesion  Oropharynx: Mucosa pink and moist, soft palate, posterior pharynx and oropharyngeal wall without mass/lesion  Neck: Supple, symmetric, trachea midline, no palpable mass/lesion, no palpable cervical lymphadenopathy  Skin: Warm and dry, no concerning lesions  Respiratory: Respirations even, unlabored     Nasal/Nasopharyngo/Laryn/Hypopharyngoscopy Procedures   Procedure: Flexible laryngoscopy  In order to fully examine the upper aerodigestive tract, including the larynx, in a patient with a hyperactive gag reflex, flexible endoscopy is required.  After explaining the procedure and obtaining verbal consent, a timeout was performed with the patient's participation according to the universal protocol. Both nasal cavities were anesthetized with 4% Xylocaine spray mixed with Robinson-Synephrine. The flexible laryngoscope was inserted into the nasal cavity and advanced to visualize the nasal cavity, nasopharynx, the posterior oropharynx, hypopharynx, and the endolarynx with the above findings noted. The scope was removed and the procedure terminated. The patient tolerated this procedure well without apparent complication.      Procedure:  Diagnostic nasal, nasopharyngoscopy, laryngoscopy and hypopharyngoscopy.    Routine preparation with  local atomizer with 1% neosynephrine and lidocaine . With customary flexible endoscope.     NOSE:   External:  No gross deformity   Intranasal:    Mucosa:  No polyps, ulcers or lesions.    Septum:  No gross deformity.    Turbinates:  Not enlarged.    Nasopharynx:  No lesions.   Mucosa:  No lesions.   Adenoids:  Present.   Posterior Choanae:  Patent.   Eustachian Tubes:  Patent.  Larynx/hypopharynx:   Epiglottis:  No lesions, without edema.   AE Folds:  No lesions.   Vocal cords:  polyps on right  nl mobility   Subglottis: No obvious stenosis   Hypopharynx:  No lesions.   Piriform sinus:  No pooling or lesions.   Post Cricoid:  No edema or erythema   Assessment:       1. Vocal cord polyps    2. Other dysphagia    3. Hoarseness        Plan:       DL with excision VC polyps in OR

## 2023-07-31 ENCOUNTER — HOSPITAL ENCOUNTER (OUTPATIENT)
Dept: RADIOLOGY | Facility: HOSPITAL | Age: 73
Discharge: HOME | End: 2023-07-31
Attending: NURSE PRACTITIONER
Payer: MEDICARE

## 2023-07-31 ENCOUNTER — TELEPHONE (OUTPATIENT)
Dept: GASTROENTEROLOGY | Facility: CLINIC | Age: 73
End: 2023-07-31
Payer: MEDICARE

## 2023-07-31 DIAGNOSIS — R74.8 ELEVATED LIVER ENZYMES: ICD-10-CM

## 2023-07-31 DIAGNOSIS — N28.9 RENAL INSUFFICIENCY: ICD-10-CM

## 2023-07-31 PROCEDURE — 76700 US ABDOMEN COMPLETE: ICD-10-PCS | Mod: 26,,, | Performed by: STUDENT IN AN ORGANIZED HEALTH CARE EDUCATION/TRAINING PROGRAM

## 2023-07-31 PROCEDURE — 76700 US EXAM ABDOM COMPLETE: CPT | Mod: TC

## 2023-07-31 PROCEDURE — 76700 US EXAM ABDOM COMPLETE: CPT | Mod: 26,,, | Performed by: STUDENT IN AN ORGANIZED HEALTH CARE EDUCATION/TRAINING PROGRAM

## 2023-07-31 NOTE — TELEPHONE ENCOUNTER
Attempted to call results. Line busy.          ----- Message from SARA White sent at 7/31/2023 12:35 PM CDT -----  Abdominal ultrasound is read as normal

## 2023-08-01 ENCOUNTER — TELEPHONE (OUTPATIENT)
Dept: GASTROENTEROLOGY | Facility: CLINIC | Age: 73
End: 2023-08-01
Payer: MEDICARE

## 2023-08-01 NOTE — TELEPHONE ENCOUNTER
Results called to patient. Verbalized understanding.            ----- Message from SARA White sent at 7/31/2023 12:35 PM CDT -----  Abdominal ultrasound is read as normal

## 2023-08-11 ENCOUNTER — TELEPHONE (OUTPATIENT)
Dept: OTOLARYNGOLOGY | Facility: CLINIC | Age: 73
End: 2023-08-11
Payer: MEDICARE

## 2023-08-15 ENCOUNTER — ANESTHESIA (OUTPATIENT)
Dept: SURGERY | Facility: HOSPITAL | Age: 73
End: 2023-08-15
Payer: MEDICARE

## 2023-08-15 ENCOUNTER — HOSPITAL ENCOUNTER (OUTPATIENT)
Facility: HOSPITAL | Age: 73
Discharge: HOME | End: 2023-08-15
Attending: OTOLARYNGOLOGY | Admitting: OTOLARYNGOLOGY
Payer: MEDICARE

## 2023-08-15 ENCOUNTER — ANESTHESIA EVENT (OUTPATIENT)
Dept: SURGERY | Facility: HOSPITAL | Age: 73
End: 2023-08-15
Payer: MEDICARE

## 2023-08-15 VITALS
OXYGEN SATURATION: 98 % | HEART RATE: 48 BPM | DIASTOLIC BLOOD PRESSURE: 62 MMHG | TEMPERATURE: 98 F | RESPIRATION RATE: 16 BRPM | SYSTOLIC BLOOD PRESSURE: 138 MMHG

## 2023-08-15 DIAGNOSIS — Q31.8 VOCAL CORD ANOMALY: ICD-10-CM

## 2023-08-15 DIAGNOSIS — J38.3 VOCAL CORD DYSPLASIA: Primary | ICD-10-CM

## 2023-08-15 PROCEDURE — 36000709 HC OR TIME LEV III EA ADD 15 MIN: Performed by: OTOLARYNGOLOGY

## 2023-08-15 PROCEDURE — 31541 PR LARYNGOSCOPY,DIRCT,OP SCOP,EXC TUMR: ICD-10-PCS | Mod: ,,, | Performed by: OTOLARYNGOLOGY

## 2023-08-15 PROCEDURE — 88305 TISSUE EXAM BY PATHOLOGIST: CPT | Mod: TC,SUR | Performed by: OTOLARYNGOLOGY

## 2023-08-15 PROCEDURE — 71000015 HC POSTOP RECOV 1ST HR: Performed by: OTOLARYNGOLOGY

## 2023-08-15 PROCEDURE — D9220A PRA ANESTHESIA: ICD-10-PCS | Mod: ANES,,, | Performed by: ANESTHESIOLOGY

## 2023-08-15 PROCEDURE — D9220A PRA ANESTHESIA: ICD-10-PCS | Mod: CRNA,,, | Performed by: NURSE ANESTHETIST, CERTIFIED REGISTERED

## 2023-08-15 PROCEDURE — D9220A PRA ANESTHESIA: Mod: CRNA,,, | Performed by: NURSE ANESTHETIST, CERTIFIED REGISTERED

## 2023-08-15 PROCEDURE — 37000008 HC ANESTHESIA 1ST 15 MINUTES: Performed by: OTOLARYNGOLOGY

## 2023-08-15 PROCEDURE — D9220A PRA ANESTHESIA: Mod: ANES,,, | Performed by: ANESTHESIOLOGY

## 2023-08-15 PROCEDURE — 37000009 HC ANESTHESIA EA ADD 15 MINS: Performed by: OTOLARYNGOLOGY

## 2023-08-15 PROCEDURE — 31541 LARYNSCOP W/TUMR EXC + SCOPE: CPT | Mod: ,,, | Performed by: OTOLARYNGOLOGY

## 2023-08-15 PROCEDURE — 36000708 HC OR TIME LEV III 1ST 15 MIN: Performed by: OTOLARYNGOLOGY

## 2023-08-15 PROCEDURE — 88305 TISSUE EXAM BY PATHOLOGIST: CPT | Mod: 26,,, | Performed by: PATHOLOGY

## 2023-08-15 PROCEDURE — 63600175 PHARM REV CODE 636 W HCPCS: Performed by: NURSE ANESTHETIST, CERTIFIED REGISTERED

## 2023-08-15 PROCEDURE — 25000003 PHARM REV CODE 250: Performed by: OTOLARYNGOLOGY

## 2023-08-15 PROCEDURE — 71000033 HC RECOVERY, INTIAL HOUR: Performed by: OTOLARYNGOLOGY

## 2023-08-15 PROCEDURE — 88305 SURGICAL PATHOLOGY: ICD-10-PCS | Mod: 26,,, | Performed by: PATHOLOGY

## 2023-08-15 PROCEDURE — 25000003 PHARM REV CODE 250: Performed by: NURSE ANESTHETIST, CERTIFIED REGISTERED

## 2023-08-15 RX ORDER — FENTANYL CITRATE 50 UG/ML
INJECTION, SOLUTION INTRAMUSCULAR; INTRAVENOUS
Status: DISCONTINUED | OUTPATIENT
Start: 2023-08-15 | End: 2023-08-15

## 2023-08-15 RX ORDER — HYDROMORPHONE HYDROCHLORIDE 2 MG/ML
0.5 INJECTION, SOLUTION INTRAMUSCULAR; INTRAVENOUS; SUBCUTANEOUS EVERY 5 MIN PRN
Status: DISCONTINUED | OUTPATIENT
Start: 2023-08-15 | End: 2023-08-15 | Stop reason: HOSPADM

## 2023-08-15 RX ORDER — ONDANSETRON 2 MG/ML
4 INJECTION INTRAMUSCULAR; INTRAVENOUS DAILY PRN
Status: DISCONTINUED | OUTPATIENT
Start: 2023-08-15 | End: 2023-08-15 | Stop reason: HOSPADM

## 2023-08-15 RX ORDER — PROPOFOL 10 MG/ML
VIAL (ML) INTRAVENOUS
Status: DISCONTINUED | OUTPATIENT
Start: 2023-08-15 | End: 2023-08-15

## 2023-08-15 RX ORDER — SODIUM CHLORIDE, SODIUM LACTATE, POTASSIUM CHLORIDE, CALCIUM CHLORIDE 600; 310; 30; 20 MG/100ML; MG/100ML; MG/100ML; MG/100ML
125 INJECTION, SOLUTION INTRAVENOUS CONTINUOUS
Status: DISCONTINUED | OUTPATIENT
Start: 2023-08-15 | End: 2023-08-15 | Stop reason: HOSPADM

## 2023-08-15 RX ORDER — SODIUM CHLORIDE 9 MG/ML
INJECTION, SOLUTION INTRAVENOUS CONTINUOUS
Status: DISPENSED | OUTPATIENT
Start: 2023-08-15

## 2023-08-15 RX ORDER — OXYCODONE HYDROCHLORIDE 5 MG/1
5 TABLET ORAL
Status: DISCONTINUED | OUTPATIENT
Start: 2023-08-15 | End: 2023-08-15 | Stop reason: HOSPADM

## 2023-08-15 RX ORDER — MORPHINE SULFATE 10 MG/ML
4 INJECTION INTRAMUSCULAR; INTRAVENOUS; SUBCUTANEOUS EVERY 5 MIN PRN
Status: DISCONTINUED | OUTPATIENT
Start: 2023-08-15 | End: 2023-08-15 | Stop reason: HOSPADM

## 2023-08-15 RX ORDER — ROCURONIUM BROMIDE 10 MG/ML
INJECTION, SOLUTION INTRAVENOUS
Status: DISCONTINUED | OUTPATIENT
Start: 2023-08-15 | End: 2023-08-15

## 2023-08-15 RX ORDER — LIDOCAINE HYDROCHLORIDE 40 MG/ML
SOLUTION TOPICAL
Status: DISCONTINUED | OUTPATIENT
Start: 2023-08-15 | End: 2023-08-15

## 2023-08-15 RX ORDER — LIDOCAINE HYDROCHLORIDE 20 MG/ML
INJECTION, SOLUTION EPIDURAL; INFILTRATION; INTRACAUDAL; PERINEURAL
Status: DISCONTINUED | OUTPATIENT
Start: 2023-08-15 | End: 2023-08-15

## 2023-08-15 RX ORDER — DIPHENHYDRAMINE HYDROCHLORIDE 50 MG/ML
25 INJECTION INTRAMUSCULAR; INTRAVENOUS EVERY 6 HOURS PRN
Status: DISCONTINUED | OUTPATIENT
Start: 2023-08-15 | End: 2023-08-15 | Stop reason: HOSPADM

## 2023-08-15 RX ORDER — MEPERIDINE HYDROCHLORIDE 25 MG/ML
25 INJECTION INTRAMUSCULAR; INTRAVENOUS; SUBCUTANEOUS EVERY 10 MIN PRN
Status: DISCONTINUED | OUTPATIENT
Start: 2023-08-15 | End: 2023-08-15 | Stop reason: HOSPADM

## 2023-08-15 RX ORDER — DEXAMETHASONE SODIUM PHOSPHATE 4 MG/ML
INJECTION, SOLUTION INTRA-ARTICULAR; INTRALESIONAL; INTRAMUSCULAR; INTRAVENOUS; SOFT TISSUE
Status: DISCONTINUED | OUTPATIENT
Start: 2023-08-15 | End: 2023-08-15

## 2023-08-15 RX ADMIN — ROCURONIUM BROMIDE 30 MG: 10 INJECTION, SOLUTION INTRAVENOUS at 08:08

## 2023-08-15 RX ADMIN — SODIUM CHLORIDE: 9 INJECTION, SOLUTION INTRAVENOUS at 07:08

## 2023-08-15 RX ADMIN — DEXAMETHASONE SODIUM PHOSPHATE 8 MG: 4 INJECTION, SOLUTION INTRA-ARTICULAR; INTRALESIONAL; INTRAMUSCULAR; INTRAVENOUS; SOFT TISSUE at 08:08

## 2023-08-15 RX ADMIN — SUGAMMADEX 200 MG: 100 INJECTION, SOLUTION INTRAVENOUS at 08:08

## 2023-08-15 RX ADMIN — LIDOCAINE HYDROCHLORIDE 120 MG: 40 SOLUTION TOPICAL at 08:08

## 2023-08-15 RX ADMIN — FENTANYL CITRATE 100 MCG: 50 INJECTION INTRAMUSCULAR; INTRAVENOUS at 08:08

## 2023-08-15 RX ADMIN — PROPOFOL 100 MG: 10 INJECTION, EMULSION INTRAVENOUS at 08:08

## 2023-08-15 RX ADMIN — LIDOCAINE HYDROCHLORIDE 80 MG: 20 INJECTION, SOLUTION INTRAVENOUS at 08:08

## 2023-08-15 NOTE — ANESTHESIA PREPROCEDURE EVALUATION
08/15/2023  Sami Kerns is a 73 y.o., male.      Pre-op Assessment    I have reviewed the Patient Summary Reports.     I have reviewed the Nursing Notes. I have reviewed the NPO Status.   I have reviewed the Medications.     Review of Systems  Anesthesia Hx:  No problems with previous Anesthesia    Social:  No Alcohol Use, Smoker    Hematology/Oncology:  Hematology Normal   Oncology Normal     EENT/Dental:EENT/Dental Normal   Cardiovascular:   Hypertension CAD  CABG/stent  Angina CHF CHF / CABG   Pulmonary:   COPD    Renal/:   Chronic Renal Disease, CKD    Hepatic/GI:   Hiatal Hernia, GERD    Musculoskeletal:   Arthritis     Neurological:  Neurology Normal    Endocrine:  Endocrine Normal    Dermatological:  Skin Normal    Psych:  Psychiatric Normal           Physical Exam  General: Well nourished    Airway:  Mallampati: II / II  Mouth Opening: Normal  TM Distance: > 6 cm  Tongue: Normal  Neck ROM: Normal ROM    Chest/Lungs:  Clear to auscultation, Normal Respiratory Rate    Heart:  Rate: Normal  Rhythm: Regular Rhythm        Anesthesia Plan  Type of Anesthesia, risks & benefits discussed:    Anesthesia Type: Gen ETT  Intra-op Monitoring Plan: Standard ASA Monitors  Post Op Pain Control Plan: multimodal analgesia  Induction:  IV  Informed Consent: Informed consent signed with the Patient and all parties understand the risks and agree with anesthesia plan.  All questions answered.   ASA Score: 4  Day of Surgery Review of History & Physical: H&P Update referred to the surgeon/provider.I have interviewed and examined the patient. I have reviewed the patient's H&P dated: There are no significant changes.     Ready For Surgery From Anesthesia Perspective.     .

## 2023-08-15 NOTE — OP NOTE
Surgery Date: 8/15/2023     Surgeon(s) and Role:     * Jesse Smalls MD - Primary    Assisting Surgeon: None    Pre-op Diagnosis:  Vocal cord polyps [J38.1]  Other dysphagia [R13.19]  Hoarseness [R49.0]    Post-op Diagnosis:  Post-Op Diagnosis Codes:     * Vocal cord polyps [J38.1]     * Other dysphagia [R13.19]     * Hoarseness [R49.0]    Procedure(s) (LRB):  LARYNGOSCOPY, DIRECT (N/A)  EXCISION, LESION, VOCAL CORD (N/A)        After general ET anesthesia a rigid laryngoscope was inserted atraumatically. With magnification the vocal cords were examined  on the right side polyps were removed with biting forceps with care not to damage underlying muscle. The specimens were sent to pathology for permanent section There was no further bleeding the patient was then reversed and taken to RR in stable condition.   Anesthesia: General    Operative Findings: VC polyp right    Estimated Blood Loss: 0

## 2023-08-15 NOTE — BRIEF OP NOTE
[facility] - Orthopedic Periop Services  Brief Operative Note    Surgery Date: 8/15/2023     Surgeon(s) and Role:     * Jesse Smalls MD - Primary    Assisting Surgeon: None    Pre-op Diagnosis:  Vocal cord polyps [J38.1]  Other dysphagia [R13.19]  Hoarseness [R49.0]    Post-op Diagnosis:  Post-Op Diagnosis Codes:     * Vocal cord polyps [J38.1]     * Other dysphagia [R13.19]     * Hoarseness [R49.0]    Procedure(s) (LRB):  LARYNGOSCOPY, DIRECT (N/A)  EXCISION, LESION, VOCAL CORD (N/A)    Anesthesia: General    Operative Findings: VC polyp right    Estimated Blood Loss: 0         Specimens:   Specimen (24h ago, onward)      None              Discharge Note    OUTCOME: Patient tolerated treatment/procedure well without complication and is now ready for discharge.    DISPOSITION: Home or Self Care    FINAL DIAGNOSIS: VC polyp   FOLLOWUP: In clinic    DISCHARGE INSTRUCTIONS:  No discharge procedures on file.

## 2023-08-15 NOTE — ANESTHESIA POSTPROCEDURE EVALUATION
Anesthesia Post Evaluation    Patient: Sami Kerns    Procedure(s) Performed: Procedure(s) (LRB):  LARYNGOSCOPY, DIRECT (N/A)  EXCISION, LESION, VOCAL CORD (Right)    Final Anesthesia Type: general      Patient location during evaluation: PACU  Patient participation: Yes- Able to Participate  Level of consciousness: awake and sedated  Post-procedure vital signs: reviewed and stable  Pain management: adequate  Airway patency: patent  RAULITO mitigation strategies: Multimodal analgesia  PONV status at discharge: No PONV  Anesthetic complications: no      Cardiovascular status: blood pressure returned to baseline  Respiratory status: unassisted  Hydration status: euvolemic  Follow-up not needed.          Vitals Value Taken Time   /62 08/15/23 0904   Temp 36.6 °C (97.8 °F) 08/15/23 0830   Pulse 47 08/15/23 0939   Resp 16 08/15/23 0930   SpO2 99 % 08/15/23 0939   Vitals shown include unvalidated device data.      Event Time   Out of Recovery 08:51:00         Pain/Nicholas Score: Nicholas Score: 10 (8/15/2023  9:00 AM)

## 2023-08-15 NOTE — TRANSFER OF CARE
Anesthesia Transfer of Care Note    Patient: Sami Kerns    Procedure(s) Performed: Procedure(s) (LRB):  LARYNGOSCOPY, DIRECT (N/A)  EXCISION, LESION, VOCAL CORD (Right)    Patient location: PACU    Anesthesia Type: general    Transport from OR: Transported from OR on room air with adequate spontaneous ventilation    Post pain: adequate analgesia    Post assessment: no apparent anesthetic complications and tolerated procedure well    Post vital signs: stable    Level of consciousness: responds to stimulation    Nausea/Vomiting: no nausea/vomiting    Complications: none    Transfer of care protocol was followedComments: Report Given to PACU rn VSS      Last vitals:   Visit Vitals  BP (!) 108/50 (BP Location: Right arm)   Pulse (!) 50   Temp 36.6 °C (97.8 °F) (Oral)   Resp 12   SpO2 100%

## 2023-08-15 NOTE — ANESTHESIA PROCEDURE NOTES
Intubation    Date/Time: 8/15/2023 8:03 AM    Performed by: Bakari Obregon CRNA  Authorized by: Champ Hamilton MD    Intubation:     Induction:  Intravenous    Intubated:  Postinduction    Mask Ventilation:  Easy mask    Attempts:  1    Attempted By:  CRNA    Method of Intubation:  Direct    Blade:  Brayan 4    Laryngeal View Grade: Grade IIA - cords partially seen      Difficult Airway Encountered?: No      Complications:  None    Airway Device:  Oral endotracheal tube    Airway Device Size:  7.0    Style/Cuff Inflation:  Cuffed    Inflation Amount (mL):  8    Tube secured:  22    Secured at:  The lips    Placement Verified By:  Capnometry    Complicating Factors:  None    Findings Post-Intubation:  BS equal bilateral and atraumatic/condition of teeth unchanged

## 2023-08-15 NOTE — OR NURSING
0820 Rec'd pt to PACU asleep with oral airway in place. Jaw thrust maneuver performed to maintain airway. VSS. No bleeding/drainage noted from throat. No needs at this time. Will continue to monitor.     0837 Oral airway removed, respirations even and unlabored. Reoriented to surroundings. Denies pain/needs. Will continue to monitor.     0851 Out of PACU. VSS. No signs of bleeding/distress noted.     0855 Pt to ASC 10 awake and alert with no distress noted, respirations even and unlabored. Family at bedside. Bedside report given to ALEKSANDER Cowart RN. No bleeding/drainage noted from throat. Denies pain/needs. /62, P 51, R 16, O2 97% RA.

## 2023-08-16 LAB
ESTROGEN SERPL-MCNC: NORMAL PG/ML
INSULIN SERPL-ACNC: NORMAL U[IU]/ML
LAB AP CLINICAL INFORMATION: NORMAL
LAB AP GROSS DESCRIPTION: NORMAL
LAB AP LABORATORY NOTES: NORMAL
T3RU NFR SERPL: NORMAL %

## 2023-08-24 ENCOUNTER — OFFICE VISIT (OUTPATIENT)
Dept: OTOLARYNGOLOGY | Facility: CLINIC | Age: 73
End: 2023-08-24
Payer: MEDICARE

## 2023-08-24 VITALS — HEIGHT: 71 IN | BODY MASS INDEX: 27.86 KG/M2 | WEIGHT: 199 LBS

## 2023-08-24 DIAGNOSIS — J38.1 VOCAL CORD POLYPS: ICD-10-CM

## 2023-08-24 DIAGNOSIS — Z98.890: Primary | ICD-10-CM

## 2023-08-24 PROCEDURE — 99214 OFFICE O/P EST MOD 30 MIN: CPT | Mod: PBBFAC | Performed by: OTOLARYNGOLOGY

## 2023-08-24 PROCEDURE — 1160F PR REVIEW ALL MEDS BY PRESCRIBER/CLIN PHARMACIST DOCUMENTED: ICD-10-PCS | Mod: CPTII,,, | Performed by: OTOLARYNGOLOGY

## 2023-08-24 PROCEDURE — 1159F PR MEDICATION LIST DOCUMENTED IN MEDICAL RECORD: ICD-10-PCS | Mod: CPTII,,, | Performed by: OTOLARYNGOLOGY

## 2023-08-24 PROCEDURE — 3008F BODY MASS INDEX DOCD: CPT | Mod: CPTII,,, | Performed by: OTOLARYNGOLOGY

## 2023-08-24 PROCEDURE — 99213 OFFICE O/P EST LOW 20 MIN: CPT | Mod: S$PBB,,, | Performed by: OTOLARYNGOLOGY

## 2023-08-24 PROCEDURE — 1159F MED LIST DOCD IN RCRD: CPT | Mod: CPTII,,, | Performed by: OTOLARYNGOLOGY

## 2023-08-24 PROCEDURE — 3008F PR BODY MASS INDEX (BMI) DOCUMENTED: ICD-10-PCS | Mod: CPTII,,, | Performed by: OTOLARYNGOLOGY

## 2023-08-24 PROCEDURE — 99213 PR OFFICE/OUTPT VISIT, EST, LEVL III, 20-29 MIN: ICD-10-PCS | Mod: S$PBB,,, | Performed by: OTOLARYNGOLOGY

## 2023-08-24 PROCEDURE — 4010F ACE/ARB THERAPY RXD/TAKEN: CPT | Mod: CPTII,,, | Performed by: OTOLARYNGOLOGY

## 2023-08-24 PROCEDURE — 4010F PR ACE/ARB THEARPY RXD/TAKEN: ICD-10-PCS | Mod: CPTII,,, | Performed by: OTOLARYNGOLOGY

## 2023-08-24 PROCEDURE — 1160F RVW MEDS BY RX/DR IN RCRD: CPT | Mod: CPTII,,, | Performed by: OTOLARYNGOLOGY

## 2023-08-24 NOTE — PROGRESS NOTES
Subjective:       Patient ID: Sami Kerns is a 73 y.o. male.    Chief Complaint: Follow-up (Post-op vocal cord polyps removal.)    HPI  Review of Systems    Objective:      Physical Exam  Healing well voice OK   Assessment:       1. Status post excision of vocal cord nodule    2. Vocal cord polyps        Plan:       F/u 2 months

## 2023-10-09 ENCOUNTER — OFFICE VISIT (OUTPATIENT)
Dept: FAMILY MEDICINE | Facility: CLINIC | Age: 73
End: 2023-10-09
Payer: MEDICARE

## 2023-10-09 VITALS
DIASTOLIC BLOOD PRESSURE: 69 MMHG | TEMPERATURE: 97 F | HEIGHT: 71 IN | HEART RATE: 61 BPM | SYSTOLIC BLOOD PRESSURE: 123 MMHG | RESPIRATION RATE: 18 BRPM | BODY MASS INDEX: 27.27 KG/M2 | OXYGEN SATURATION: 98 % | WEIGHT: 194.81 LBS

## 2023-10-09 DIAGNOSIS — I10 ESSENTIAL (PRIMARY) HYPERTENSION: ICD-10-CM

## 2023-10-09 DIAGNOSIS — Z87.891 HISTORY OF TOBACCO USE: Primary | ICD-10-CM

## 2023-10-09 DIAGNOSIS — E78.5 DYSLIPIDEMIA: ICD-10-CM

## 2023-10-09 PROCEDURE — 3288F FALL RISK ASSESSMENT DOCD: CPT | Mod: ,,, | Performed by: INTERNAL MEDICINE

## 2023-10-09 PROCEDURE — 1126F AMNT PAIN NOTED NONE PRSNT: CPT | Mod: ,,, | Performed by: INTERNAL MEDICINE

## 2023-10-09 PROCEDURE — 1101F PT FALLS ASSESS-DOCD LE1/YR: CPT | Mod: ,,, | Performed by: INTERNAL MEDICINE

## 2023-10-09 PROCEDURE — 3078F PR MOST RECENT DIASTOLIC BLOOD PRESSURE < 80 MM HG: ICD-10-PCS | Mod: ,,, | Performed by: INTERNAL MEDICINE

## 2023-10-09 PROCEDURE — 3008F BODY MASS INDEX DOCD: CPT | Mod: ,,, | Performed by: INTERNAL MEDICINE

## 2023-10-09 PROCEDURE — 1160F RVW MEDS BY RX/DR IN RCRD: CPT | Mod: ,,, | Performed by: INTERNAL MEDICINE

## 2023-10-09 PROCEDURE — 1159F PR MEDICATION LIST DOCUMENTED IN MEDICAL RECORD: ICD-10-PCS | Mod: ,,, | Performed by: INTERNAL MEDICINE

## 2023-10-09 PROCEDURE — 3074F PR MOST RECENT SYSTOLIC BLOOD PRESSURE < 130 MM HG: ICD-10-PCS | Mod: ,,, | Performed by: INTERNAL MEDICINE

## 2023-10-09 PROCEDURE — 1160F PR REVIEW ALL MEDS BY PRESCRIBER/CLIN PHARMACIST DOCUMENTED: ICD-10-PCS | Mod: ,,, | Performed by: INTERNAL MEDICINE

## 2023-10-09 PROCEDURE — 1159F MED LIST DOCD IN RCRD: CPT | Mod: ,,, | Performed by: INTERNAL MEDICINE

## 2023-10-09 PROCEDURE — 3008F PR BODY MASS INDEX (BMI) DOCUMENTED: ICD-10-PCS | Mod: ,,, | Performed by: INTERNAL MEDICINE

## 2023-10-09 PROCEDURE — 4010F ACE/ARB THERAPY RXD/TAKEN: CPT | Mod: ,,, | Performed by: INTERNAL MEDICINE

## 2023-10-09 PROCEDURE — 3288F PR FALLS RISK ASSESSMENT DOCUMENTED: ICD-10-PCS | Mod: ,,, | Performed by: INTERNAL MEDICINE

## 2023-10-09 PROCEDURE — 3078F DIAST BP <80 MM HG: CPT | Mod: ,,, | Performed by: INTERNAL MEDICINE

## 2023-10-09 PROCEDURE — 1126F PR PAIN SEVERITY QUANTIFIED, NO PAIN PRESENT: ICD-10-PCS | Mod: ,,, | Performed by: INTERNAL MEDICINE

## 2023-10-09 PROCEDURE — 3074F SYST BP LT 130 MM HG: CPT | Mod: ,,, | Performed by: INTERNAL MEDICINE

## 2023-10-09 PROCEDURE — 4010F PR ACE/ARB THEARPY RXD/TAKEN: ICD-10-PCS | Mod: ,,, | Performed by: INTERNAL MEDICINE

## 2023-10-09 PROCEDURE — 99213 PR OFFICE/OUTPT VISIT, EST, LEVL III, 20-29 MIN: ICD-10-PCS | Mod: ,,, | Performed by: INTERNAL MEDICINE

## 2023-10-09 PROCEDURE — 1101F PR PT FALLS ASSESS DOC 0-1 FALLS W/OUT INJ PAST YR: ICD-10-PCS | Mod: ,,, | Performed by: INTERNAL MEDICINE

## 2023-10-09 PROCEDURE — 99213 OFFICE O/P EST LOW 20 MIN: CPT | Mod: ,,, | Performed by: INTERNAL MEDICINE

## 2023-10-12 PROBLEM — Z87.891 HISTORY OF TOBACCO USE: Status: ACTIVE | Noted: 2023-03-08

## 2023-10-12 NOTE — PROGRESS NOTES
Subjective:       Patient ID: Sami Kerns is a 73 y.o. male.    Chief Complaint: Pneumonia (Vaccine today )  Patient presents with chronic dyslipidemia chronic hypertension is greater than 35 pack year history of tobacco use.  Plan today is a low-dose CT scan chest.Due to the many adverse health risks of smoking tobacco,  Patient has been encouraged to quit smoking, and smoking sensation treatments have been   treatments have been offered and a  smoking cessation class has been recommended to the patient    Pneumonia  There is no cough or shortness of breath. Pertinent negatives include no appetite change, chest pain, myalgias or rhinorrhea.     .    Current Medications:    Current Outpatient Medications:     albuterol (VENTOLIN HFA) 90 mcg/actuation inhaler, Inhale 2 puffs into the lungs every 6 (six) hours as needed for Wheezing. Rescue, Disp: 18 g, Rfl: 5    amiodarone (PACERONE) 200 MG Tab, Take by mouth once daily., Disp: , Rfl:     ascorbic acid, vitamin C, (VITAMIN C) 1000 MG tablet, Take 1,000 mg by mouth once daily., Disp: , Rfl:     aspirin (ECOTRIN) 81 MG EC tablet, Take 81 mg by mouth once daily., Disp: , Rfl:     atorvastatin (LIPITOR) 80 MG tablet, Take 1 tablet by mouth once daily., Disp: , Rfl:     cilostazoL (PLETAL) 100 MG Tab, Take 100 mg by mouth 2 (two) times daily., Disp: , Rfl:     clopidogreL (PLAVIX) 75 mg tablet, Take 1 tablet (75 mg total) by mouth once daily., Disp: 90 tablet, Rfl: 1    doxycycline (VIBRAMYCIN) 100 MG Cap, Take 1 capsule (100 mg total) by mouth 2 (two) times a day., Disp: 20 capsule, Rfl: 0    ezetimibe (ZETIA) 10 mg tablet, Take 10 mg by mouth once daily., Disp: , Rfl:     ferrous sulfate (FEOSOL) 325 mg (65 mg iron) Tab tablet, Take 1 tablet (325 mg total) by mouth 3 (three) times daily., Disp: 90 tablet, Rfl: 1    furosemide (LASIX) 40 MG tablet, Take 40 mg by mouth once daily., Disp: , Rfl:     hydrALAZINE (APRESOLINE) 50 MG tablet, Take 1 tablet (50 mg total) by  mouth 3 (three) times daily., Disp: 90 tablet, Rfl: 1    HYDROcodone-acetaminophen (NORCO) 7.5-325 mg per tablet, Take 1 tablet by mouth daily as needed for Pain., Disp: 12 tablet, Rfl: 0    isosorbide mononitrate (IMDUR) 30 MG 24 hr tablet, Take 15 mg by mouth., Disp: , Rfl:     latanoprost 0.005 % ophthalmic solution, Place 1 drop into both eyes every evening., Disp: , Rfl:     losartan (COZAAR) 50 MG tablet, Take 2 tablets (100 mg total) by mouth once daily., Disp: 90 tablet, Rfl: 1    metoprolol succinate (TOPROL-XL) 50 MG 24 hr tablet, Take 1 tablet by mouth once daily., Disp: , Rfl:     naproxen (NAPROSYN) 500 MG tablet, TAKE ONE TABLET BY MOUTH TWICE DAILY AS NEEDED, Disp: 20 tablet, Rfl: 4    nitroGLYCERIN (NITROSTAT) 0.4 MG SL tablet, Place under the tongue., Disp: , Rfl:     tamsulosin (FLOMAX) 0.4 mg Cap, Take 1 capsule (0.4 mg total) by mouth once daily., Disp: 90 capsule, Rfl: 3    timolol maleate 0.5% (TIMOPTIC) 0.5 % Drop, Place into both eyes., Disp: , Rfl:     valsartan (DIOVAN) 320 MG tablet, Take 320 mg by mouth., Disp: , Rfl:     propranoloL (INDERAL) 20 MG tablet, Take 1 tablet (20 mg total) by mouth 2 (two) times daily. (Patient not taking: Reported on 4/3/2023), Disp: 90 tablet, Rfl: 1  No current facility-administered medications for this visit.    Facility-Administered Medications Ordered in Other Visits:     0.9%  NaCl infusion, , Intravenous, Continuous, Jesse Smalls MD, Last Rate: 50 mL/hr at 08/15/23 0714, New Bag at 08/15/23 0714           Review of Systems   Constitutional:  Negative for appetite change and fatigue.   HENT:  Negative for nasal congestion and rhinorrhea.    Eyes:  Negative for redness and visual disturbance.   Respiratory:  Negative for cough and shortness of breath.    Cardiovascular:  Negative for chest pain and leg swelling.   Gastrointestinal:  Negative for abdominal pain, constipation and diarrhea.   Endocrine: Negative for polyuria.   Genitourinary:  Negative  "for difficulty urinating, dysuria and erectile dysfunction.   Musculoskeletal:  Negative for arthralgias and myalgias.   Integumentary:  Negative for rash and mole/lesion.   All other systems reviewed and are negative.               Vitals:    10/09/23 0843   BP: 123/69   BP Location: Right arm   Patient Position: Sitting   BP Method: Large (Automatic)   Pulse: 61   Resp: 18   Temp: 97.1 °F (36.2 °C)   TempSrc: Temporal   SpO2: 98%   Weight: 88.4 kg (194 lb 12.8 oz)   Height: 5' 11" (1.803 m)        Physical Exam  Vitals and nursing note reviewed.   Constitutional:       Appearance: Normal appearance.   Cardiovascular:      Rate and Rhythm: Normal rate and regular rhythm.      Pulses: Normal pulses.      Heart sounds: Normal heart sounds.   Pulmonary:      Effort: Pulmonary effort is normal.      Breath sounds: Normal breath sounds.   Abdominal:      General: Abdomen is flat. Bowel sounds are normal.      Palpations: Abdomen is soft.   Musculoskeletal:         General: Normal range of motion.   Skin:     General: Skin is warm and dry.   Neurological:      General: No focal deficit present.      Mental Status: He is alert and oriented to person, place, and time. Mental status is at baseline.           Last Labs:     No visits with results within 1 Month(s) from this visit.   Latest known visit with results is:   Admission on 08/15/2023, Discharged on 08/15/2023   Component Date Value    Case Report 08/15/2023                      Value:Surgical Pathology                                Case: [id number]                                   Authorizing Provider:  Jesse Smalls MD       Collected:           08/15/2023 08:21 AM          Ordering Location:     [facility] -         Received:            08/15/2023 10:14 AM                                 [facility]                                                   Pathologist:           James Keith MD                                             "          Specimen:    Throat                                                                                     Final Diagnosis 08/15/2023                      Value:This result contains rich text formatting which cannot be displayed here.    Gross Description 08/15/2023                      Value:This result contains rich text formatting which cannot be displayed here.    Microscopic Description 08/15/2023                      Value:This result contains rich text formatting which cannot be displayed here.    Clinical Information 08/15/2023                      Value:This result contains rich text formatting which cannot be displayed here.    Laboratory Notes 08/15/2023                      Value:This result contains rich text formatting which cannot be displayed here.       Last Imaging:  US Abdomen Complete  Narrative: EXAMINATION:  US ABDOMEN COMPLETE    CLINICAL HISTORY:  Disorder of kidney and ureter, unspecified    TECHNIQUE:  Complete abdominal ultrasound (including pancreas, liver, gallbladder, common bile duct, spleen, aorta, IVC, and kidneys) was performed.    COMPARISON:  2021    FINDINGS:  Liver: Normal in size, measuring 16 cm. Homogeneous echotexture.  No focal hepatic lesions.    Gallbladder: No calculi, wall thickening, or pericholecystic fluid.  No sonographic Peng's sign.    Biliary system: The common duct is not dilated, measuring 4-5 mm.  No intrahepatic ductal dilatation.    Spleen: Normal in size with a homogeneous echotexture, measuring 9 cm.    Pancreas: The visualized portions of pancreas appear normal.    Right kidney: Normal in size with no hydronephrosis, measuring 10.5 cm.    Left kidney:  Normal in size with no hydronephrosis, measuring 11 cm.    Aorta: No aneurysm.    Inferior vena cava: Normal in appearance.    Miscellaneous: No ascites.  Impression: No acute findings.  No hydronephrosis.    Electronically signed by: Channing Mendosa  Date:    07/31/2023  Time:    09:31          **Labs and x-rays personally reviewed by me    ** reviewed      Objective:        Assessment:       1. History of tobacco use  CT Chest Lung Screening Low Dose      2. Essential (primary) hypertension        3. Dyslipidemia             Plan:         [unfilled]

## 2023-10-16 RX ORDER — ALBUTEROL SULFATE 90 UG/1
2 AEROSOL, METERED RESPIRATORY (INHALATION) EVERY 6 HOURS PRN
Qty: 18 G | Refills: 5 | Status: SHIPPED | OUTPATIENT
Start: 2023-10-16 | End: 2024-04-02

## 2023-10-26 ENCOUNTER — OFFICE VISIT (OUTPATIENT)
Dept: OTOLARYNGOLOGY | Facility: CLINIC | Age: 73
End: 2023-10-26
Payer: MEDICARE

## 2023-10-26 VITALS — WEIGHT: 194 LBS | BODY MASS INDEX: 27.16 KG/M2 | HEIGHT: 71 IN

## 2023-10-26 DIAGNOSIS — J38.1 VOCAL CORD POLYPS: Primary | ICD-10-CM

## 2023-10-26 PROCEDURE — 99214 PR OFFICE/OUTPT VISIT, EST, LEVL IV, 30-39 MIN: ICD-10-PCS | Mod: S$PBB,,, | Performed by: OTOLARYNGOLOGY

## 2023-10-26 PROCEDURE — 3008F BODY MASS INDEX DOCD: CPT | Mod: CPTII,,, | Performed by: OTOLARYNGOLOGY

## 2023-10-26 PROCEDURE — 1159F PR MEDICATION LIST DOCUMENTED IN MEDICAL RECORD: ICD-10-PCS | Mod: CPTII,,, | Performed by: OTOLARYNGOLOGY

## 2023-10-26 PROCEDURE — 4010F PR ACE/ARB THEARPY RXD/TAKEN: ICD-10-PCS | Mod: CPTII,,, | Performed by: OTOLARYNGOLOGY

## 2023-10-26 PROCEDURE — 1160F PR REVIEW ALL MEDS BY PRESCRIBER/CLIN PHARMACIST DOCUMENTED: ICD-10-PCS | Mod: CPTII,,, | Performed by: OTOLARYNGOLOGY

## 2023-10-26 PROCEDURE — 99214 OFFICE O/P EST MOD 30 MIN: CPT | Mod: PBBFAC | Performed by: OTOLARYNGOLOGY

## 2023-10-26 PROCEDURE — 4010F ACE/ARB THERAPY RXD/TAKEN: CPT | Mod: CPTII,,, | Performed by: OTOLARYNGOLOGY

## 2023-10-26 PROCEDURE — 3008F PR BODY MASS INDEX (BMI) DOCUMENTED: ICD-10-PCS | Mod: CPTII,,, | Performed by: OTOLARYNGOLOGY

## 2023-10-26 PROCEDURE — 1159F MED LIST DOCD IN RCRD: CPT | Mod: CPTII,,, | Performed by: OTOLARYNGOLOGY

## 2023-10-26 PROCEDURE — 99214 OFFICE O/P EST MOD 30 MIN: CPT | Mod: S$PBB,,, | Performed by: OTOLARYNGOLOGY

## 2023-10-26 PROCEDURE — 1160F RVW MEDS BY RX/DR IN RCRD: CPT | Mod: CPTII,,, | Performed by: OTOLARYNGOLOGY

## 2023-10-26 NOTE — PROGRESS NOTES
Subjective:       Patient ID: Sami Kerns is a 73 y.o. male.    Chief Complaint: Hoarse (2 month follow-up on vocal cord polyps. Pt states his throat is doing good, denies hoarseness or pain in throat. )    HPI  Review of Systems   HENT:  Positive for voice change.    All other systems reviewed and are negative.      Objective:      Physical Exam  General: NAD Voice better   Head: Normocephalic, atraumatic, no facial asymmetry/normal strength,  Ears: Both auricules normal in appearance, w/o deformities tympanic membranes normal external auditory canals normal  Nose: External nose w/o deformities normal turbinates no drainage or inflammation  Oral Cavity: Lips, gums, floor of mouth, tongue hard palate, and buccal mucosa without mass/lesion  Oropharynx: Mucosa pink and moist, soft palate, posterior pharynx and oropharyngeal wall without mass/lesion  Neck: Supple, symmetric, trachea midline, no palpable mass/lesion, no palpable cervical lymphadenopathy  Skin: Warm and dry, no concerning lesions  Respiratory: Respirations even, unlabored   Assessment:       1. Vocal cord polyps        Plan:       F/u 6 months

## 2023-10-31 ENCOUNTER — EXTERNAL CHRONIC CARE MANAGEMENT (OUTPATIENT)
Dept: FAMILY MEDICINE | Facility: CLINIC | Age: 73
End: 2023-10-31
Payer: MEDICARE

## 2023-10-31 PROCEDURE — G0511 PR CHRONIC CARE MGMT, RHC OR FQHC ONLY, 20 MINS OR MORE: ICD-10-PCS | Mod: ,,, | Performed by: INTERNAL MEDICINE

## 2023-10-31 PROCEDURE — G0511 CCM/BHI BY RHC/FQHC 20MIN MO: HCPCS | Mod: ,,, | Performed by: INTERNAL MEDICINE

## 2023-11-09 ENCOUNTER — OFFICE VISIT (OUTPATIENT)
Dept: NEPHROLOGY | Facility: CLINIC | Age: 73
End: 2023-11-09
Payer: MEDICARE

## 2023-11-09 VITALS
OXYGEN SATURATION: 100 % | HEIGHT: 71 IN | HEART RATE: 63 BPM | BODY MASS INDEX: 27.25 KG/M2 | WEIGHT: 194.63 LBS | SYSTOLIC BLOOD PRESSURE: 138 MMHG | DIASTOLIC BLOOD PRESSURE: 86 MMHG | RESPIRATION RATE: 18 BRPM

## 2023-11-09 DIAGNOSIS — I12.9 HYPERTENSIVE NEPHROSCLEROSIS, STAGE 1 THROUGH STAGE 4 OR UNSPECIFIED CHRONIC KIDNEY DISEASE: ICD-10-CM

## 2023-11-09 DIAGNOSIS — I10 ESSENTIAL HYPERTENSION: ICD-10-CM

## 2023-11-09 DIAGNOSIS — N28.9 RENAL INSUFFICIENCY: ICD-10-CM

## 2023-11-09 DIAGNOSIS — E55.9 VITAMIN D DEFICIENCY, UNSPECIFIED: ICD-10-CM

## 2023-11-09 DIAGNOSIS — N18.4 CKD (CHRONIC KIDNEY DISEASE) STAGE 4, GFR 15-29 ML/MIN: Primary | ICD-10-CM

## 2023-11-09 PROCEDURE — 3288F PR FALLS RISK ASSESSMENT DOCUMENTED: ICD-10-PCS | Mod: CPTII,,, | Performed by: INTERNAL MEDICINE

## 2023-11-09 PROCEDURE — 4010F PR ACE/ARB THEARPY RXD/TAKEN: ICD-10-PCS | Mod: CPTII,,, | Performed by: INTERNAL MEDICINE

## 2023-11-09 PROCEDURE — 3008F PR BODY MASS INDEX (BMI) DOCUMENTED: ICD-10-PCS | Mod: CPTII,,, | Performed by: INTERNAL MEDICINE

## 2023-11-09 PROCEDURE — 1126F AMNT PAIN NOTED NONE PRSNT: CPT | Mod: CPTII,,, | Performed by: INTERNAL MEDICINE

## 2023-11-09 PROCEDURE — 3079F DIAST BP 80-89 MM HG: CPT | Mod: CPTII,,, | Performed by: INTERNAL MEDICINE

## 2023-11-09 PROCEDURE — 3288F FALL RISK ASSESSMENT DOCD: CPT | Mod: CPTII,,, | Performed by: INTERNAL MEDICINE

## 2023-11-09 PROCEDURE — 1159F PR MEDICATION LIST DOCUMENTED IN MEDICAL RECORD: ICD-10-PCS | Mod: CPTII,,, | Performed by: INTERNAL MEDICINE

## 2023-11-09 PROCEDURE — 99204 OFFICE O/P NEW MOD 45 MIN: CPT | Mod: S$PBB,,, | Performed by: INTERNAL MEDICINE

## 2023-11-09 PROCEDURE — 3008F BODY MASS INDEX DOCD: CPT | Mod: CPTII,,, | Performed by: INTERNAL MEDICINE

## 2023-11-09 PROCEDURE — 1101F PR PT FALLS ASSESS DOC 0-1 FALLS W/OUT INJ PAST YR: ICD-10-PCS | Mod: CPTII,,, | Performed by: INTERNAL MEDICINE

## 2023-11-09 PROCEDURE — 3075F PR MOST RECENT SYSTOLIC BLOOD PRESS GE 130-139MM HG: ICD-10-PCS | Mod: CPTII,,, | Performed by: INTERNAL MEDICINE

## 2023-11-09 PROCEDURE — 3075F SYST BP GE 130 - 139MM HG: CPT | Mod: CPTII,,, | Performed by: INTERNAL MEDICINE

## 2023-11-09 PROCEDURE — 3079F PR MOST RECENT DIASTOLIC BLOOD PRESSURE 80-89 MM HG: ICD-10-PCS | Mod: CPTII,,, | Performed by: INTERNAL MEDICINE

## 2023-11-09 PROCEDURE — 99215 OFFICE O/P EST HI 40 MIN: CPT | Mod: PBBFAC | Performed by: INTERNAL MEDICINE

## 2023-11-09 PROCEDURE — 3066F NEPHROPATHY DOC TX: CPT | Mod: CPTII,,, | Performed by: INTERNAL MEDICINE

## 2023-11-09 PROCEDURE — 1101F PT FALLS ASSESS-DOCD LE1/YR: CPT | Mod: CPTII,,, | Performed by: INTERNAL MEDICINE

## 2023-11-09 PROCEDURE — 3066F PR DOCUMENTATION OF TREATMENT FOR NEPHROPATHY: ICD-10-PCS | Mod: CPTII,,, | Performed by: INTERNAL MEDICINE

## 2023-11-09 PROCEDURE — 99204 PR OFFICE/OUTPT VISIT, NEW, LEVL IV, 45-59 MIN: ICD-10-PCS | Mod: S$PBB,,, | Performed by: INTERNAL MEDICINE

## 2023-11-09 PROCEDURE — 4010F ACE/ARB THERAPY RXD/TAKEN: CPT | Mod: CPTII,,, | Performed by: INTERNAL MEDICINE

## 2023-11-09 PROCEDURE — 1126F PR PAIN SEVERITY QUANTIFIED, NO PAIN PRESENT: ICD-10-PCS | Mod: CPTII,,, | Performed by: INTERNAL MEDICINE

## 2023-11-09 PROCEDURE — 1159F MED LIST DOCD IN RCRD: CPT | Mod: CPTII,,, | Performed by: INTERNAL MEDICINE

## 2023-11-09 NOTE — PROGRESS NOTES
[facility] History and Physical  Patient Name: Sami Kerns  MRN: [id number]  Age: 73 y.o.  : 1950    Date: 2023 2:25 PM    Chief Complaint: Establish Care    HPI :   Mr Kerns presents to Nephrology clinic to establish care. He is followed by Dr. Michael Weems MD as her primary care provider.     HTN: diagnosed about 15 years ago. Current regimen includes lasix 40 mg daily, losartan 50 mg daily, metoprolol 50 mg daily, valsartan 320 mg daily, hydralazine 50 mg TID    CAD s/p CABG in 2017/CHF. ASA. Follows with Dr. Celina Melchor     Monoclonal gammopathy: He is followed by Dr. Arenas.     Nephrology history: FH of kidney disease- sister.  No nephrolithiasis, or recurrent UTIs. Takes Naproxen 500 mg as needed. Takes OTC ibuprofen as well.    Patient denies any CP, SOB, peripheral edema, dysuria, hematuria, changes in urinary habits, or increased frequency of urination.     Past Medical History:  has a past medical history of Adenomatous polyp of ascending colon (2021), Adenomatous polyp of descending colon (2021), Adenomatous polyp of transverse colon (2021), Anticoagulant long-term use, Blood in stool (2021), CAD (coronary artery disease), CHF (congestive heart failure), COPD (chronic obstructive pulmonary disease), GERD (gastroesophageal reflux disease), Gout, HH (hiatus hernia) (2022), HTN (hypertension), Hyperlipidemia, Iron deficiency anemia due to chronic blood loss (2021), Orchitis (2022), Osteoarthritis, Polyp of splenic flexure of colon (2021), Screening for colon cancer (2021), Urinary incontinence (2022), and Vocal cord polyps.     Past Surgical History:   has a past surgical history that includes Coronary Artery Bypass Graft (CABG); Tonsillectomy (N/A); Direct laryngoscopy (Bilateral, 05/10/2022); Vocal fold lesion excision (Bilateral, 05/10/2022); Direct laryngoscopy  (N/A, 11/11/2022); Vocal fold lesion excision (N/A, 11/11/2022); Direct laryngoscopy (N/A, 8/15/2023); and Vocal fold lesion excision (Right, 8/15/2023).     Family History:  family history includes Hypertension in his mother. No family history of kidney disease.     Social History:   reports that he has been smoking cigarettes. He has a 2.8 pack-year smoking history. He has been exposed to tobacco smoke. He has never used smokeless tobacco. He reports current alcohol use. He reports that he does not use drugs.     Allergies: has No Known Allergies.     Medications: Reviewed including OTC medications, herbal supplements, and NSAIDS.     Old records have been reviewed.      Review of Systems:  ROS: A 10 point ROS was completed and found to be negative except for that mentioned above.          Physical Exam:  Vitals:    11/09/23 1416   BP: 138/86   Pulse: 63   Resp: 18       Constitutional: sitting in chair, in NAD  Eyes: EOMI, white sclera  ENMT: moist mucus membranes, nares patent  Cardiovascular: normal rate, S1/S2 noted, no edema  Respiratory: symmetrical chest expansion, CTA-B  Gastrointestinal: +BS, soft, NT/ND  Musculoskeletal: normal, no joint erythema/effusions  Skin: no rash, no purpura, warm extremities  Neurological: Alert and Oriented x 4, afocal    Labs:   Lab Results   Component Value Date     07/19/2023    K 4.8 07/19/2023    CREATININE 2.33 (H) 07/19/2023    ALT 85 (H) 07/19/2023    AST 40 (H) 07/19/2023    LDLCALC 27 04/03/2023    CHOL 106 04/03/2023    HDL 38 (L) 04/03/2023    TRIG 204 (H) 04/03/2023    TSH 0.009 (L) 04/12/2022    WBC 8.14 07/19/2023    HGB 12.1 (L) 07/19/2023    HCT 36.1 (L) 07/19/2023     07/19/2023    PSA 1.880 04/03/2023        Otherwise Reviewed    Assessment/Plan:       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 Baseline sCr TBD today sCr pending. Counseled to avoid nephrotoxic agents such as NSAIDs. Will check for  proteinuria, and consider farxiga in future if indicated.     Proteinuria: urine Prot:Creat ratio is pending. Patient is on RAAS blockade with ARB    Anemia: CBC pending. Follows with Heme/Onc    BMD: Calcium and Phosphorus PTH, Vit D pending    HTN: well controlled with current meds     RTC 4 months with CBC, RFP, UA, urine for Prot:creat ratio, PTH, Vit D      Alisha S. Parker, DO [facility]   11/09/2023

## 2023-11-10 ENCOUNTER — TELEPHONE (OUTPATIENT)
Dept: NEPHROLOGY | Facility: CLINIC | Age: 73
End: 2023-11-10
Payer: MEDICARE

## 2023-11-10 DIAGNOSIS — E55.9 VITAMIN D DEFICIENCY, UNSPECIFIED: Primary | ICD-10-CM

## 2023-11-10 RX ORDER — ERGOCALCIFEROL 1.25 MG/1
50000 CAPSULE ORAL
Qty: 12 CAPSULE | Refills: 0 | Status: SHIPPED | OUTPATIENT
Start: 2023-11-10 | End: 2024-01-09 | Stop reason: SDUPTHER

## 2023-11-10 NOTE — TELEPHONE ENCOUNTER
----- Message from Tana Serrano DO sent at 11/10/2023 11:46 AM CST -----  Renal function stable. Recommend ergo ty

## 2023-11-30 ENCOUNTER — EXTERNAL CHRONIC CARE MANAGEMENT (OUTPATIENT)
Dept: FAMILY MEDICINE | Facility: CLINIC | Age: 73
End: 2023-11-30
Payer: MEDICARE

## 2023-11-30 PROCEDURE — G0511 CCM/BHI BY RHC/FQHC 20MIN MO: HCPCS | Mod: ,,, | Performed by: INTERNAL MEDICINE

## 2023-11-30 PROCEDURE — G0511 PR CHRONIC CARE MGMT, RHC OR FQHC ONLY, 20 MINS OR MORE: ICD-10-PCS | Mod: ,,, | Performed by: INTERNAL MEDICINE

## 2023-12-31 ENCOUNTER — EXTERNAL CHRONIC CARE MANAGEMENT (OUTPATIENT)
Dept: FAMILY MEDICINE | Facility: CLINIC | Age: 73
End: 2023-12-31
Payer: MEDICARE

## 2023-12-31 PROCEDURE — G0511 CCM/BHI BY RHC/FQHC 20MIN MO: HCPCS | Mod: ,,, | Performed by: INTERNAL MEDICINE

## 2024-01-09 ENCOUNTER — OFFICE VISIT (OUTPATIENT)
Dept: FAMILY MEDICINE | Facility: CLINIC | Age: 74
End: 2024-01-09
Payer: MEDICARE

## 2024-01-09 VITALS
TEMPERATURE: 97 F | RESPIRATION RATE: 17 BRPM | HEIGHT: 71 IN | SYSTOLIC BLOOD PRESSURE: 118 MMHG | WEIGHT: 197.19 LBS | HEART RATE: 62 BPM | DIASTOLIC BLOOD PRESSURE: 66 MMHG | OXYGEN SATURATION: 98 % | BODY MASS INDEX: 27.6 KG/M2

## 2024-01-09 DIAGNOSIS — E55.9 VITAMIN D DEFICIENCY, UNSPECIFIED: ICD-10-CM

## 2024-01-09 DIAGNOSIS — N18.31 STAGE 3A CHRONIC KIDNEY DISEASE: Primary | ICD-10-CM

## 2024-01-09 PROCEDURE — 99214 OFFICE O/P EST MOD 30 MIN: CPT | Mod: ,,, | Performed by: INTERNAL MEDICINE

## 2024-01-09 PROCEDURE — 1126F AMNT PAIN NOTED NONE PRSNT: CPT | Mod: ,,, | Performed by: INTERNAL MEDICINE

## 2024-01-09 PROCEDURE — 1159F MED LIST DOCD IN RCRD: CPT | Mod: ,,, | Performed by: INTERNAL MEDICINE

## 2024-01-09 PROCEDURE — 3074F SYST BP LT 130 MM HG: CPT | Mod: ,,, | Performed by: INTERNAL MEDICINE

## 2024-01-09 PROCEDURE — 3008F BODY MASS INDEX DOCD: CPT | Mod: ,,, | Performed by: INTERNAL MEDICINE

## 2024-01-09 PROCEDURE — 1101F PT FALLS ASSESS-DOCD LE1/YR: CPT | Mod: ,,, | Performed by: INTERNAL MEDICINE

## 2024-01-09 PROCEDURE — 3288F FALL RISK ASSESSMENT DOCD: CPT | Mod: ,,, | Performed by: INTERNAL MEDICINE

## 2024-01-09 PROCEDURE — 3078F DIAST BP <80 MM HG: CPT | Mod: ,,, | Performed by: INTERNAL MEDICINE

## 2024-01-09 RX ORDER — ACETAMINOPHEN 500 MG
10000 TABLET ORAL
Qty: 60 TABLET | Refills: 0 | Status: SHIPPED | OUTPATIENT
Start: 2024-01-10

## 2024-01-09 RX ORDER — ERGOCALCIFEROL 1.25 MG/1
50000 CAPSULE ORAL
Qty: 6 CAPSULE | Refills: 0 | Status: SHIPPED | OUTPATIENT
Start: 2024-01-09

## 2024-01-13 PROBLEM — N18.31 STAGE 3A CHRONIC KIDNEY DISEASE: Status: ACTIVE | Noted: 2024-01-13

## 2024-01-14 NOTE — PROGRESS NOTES
Subjective:       Patient ID: Sami Kerns is a 73 y.o. male.    Chief Complaint: Follow-up (epididymitis)    Follow-up     Patient seen follow up today patient does have documented vitamin-D deficiency and also chronic kidney disease patient is followed by Nephrology for the vitamin-D deficiency will start vitamin-D 42409 units every week for 6 weeks then vitamin-D 73400 units 3 times a week.  .    Current Medications:    Current Outpatient Medications:     albuterol (VENTOLIN HFA) 90 mcg/actuation inhaler, Inhale 2 puffs into the lungs every 6 (six) hours as needed for Wheezing. Rescue, Disp: 18 g, Rfl: 5    amiodarone (PACERONE) 200 MG Tab, Take by mouth once daily., Disp: , Rfl:     ascorbic acid, vitamin C, (VITAMIN C) 1000 MG tablet, Take 1,000 mg by mouth once daily., Disp: , Rfl:     aspirin (ECOTRIN) 81 MG EC tablet, Take 81 mg by mouth once daily., Disp: , Rfl:     atorvastatin (LIPITOR) 80 MG tablet, Take 1 tablet by mouth once daily., Disp: , Rfl:     cilostazoL (PLETAL) 100 MG Tab, Take 100 mg by mouth 2 (two) times daily., Disp: , Rfl:     clopidogreL (PLAVIX) 75 mg tablet, Take 1 tablet (75 mg total) by mouth once daily., Disp: 90 tablet, Rfl: 1    doxycycline (VIBRAMYCIN) 100 MG Cap, Take 1 capsule (100 mg total) by mouth 2 (two) times a day., Disp: 20 capsule, Rfl: 0    ezetimibe (ZETIA) 10 mg tablet, Take 10 mg by mouth once daily., Disp: , Rfl:     furosemide (LASIX) 40 MG tablet, Take 40 mg by mouth once daily., Disp: , Rfl:     hydrALAZINE (APRESOLINE) 50 MG tablet, Take 1 tablet (50 mg total) by mouth 3 (three) times daily., Disp: 90 tablet, Rfl: 1    HYDROcodone-acetaminophen (NORCO) 7.5-325 mg per tablet, Take 1 tablet by mouth daily as needed for Pain., Disp: 12 tablet, Rfl: 0    isosorbide mononitrate (IMDUR) 30 MG 24 hr tablet, Take 15 mg by mouth., Disp: , Rfl:     latanoprost 0.005 % ophthalmic solution, Place 1 drop into both eyes every evening., Disp: , Rfl:     losartan (COZAAR) 50  "MG tablet, Take 2 tablets (100 mg total) by mouth once daily., Disp: 90 tablet, Rfl: 1    metoprolol succinate (TOPROL-XL) 50 MG 24 hr tablet, Take 1 tablet by mouth once daily., Disp: , Rfl:     naproxen (NAPROSYN) 500 MG tablet, TAKE ONE TABLET BY MOUTH TWICE DAILY AS NEEDED, Disp: 20 tablet, Rfl: 4    nitroGLYCERIN (NITROSTAT) 0.4 MG SL tablet, Place under the tongue., Disp: , Rfl:     tamsulosin (FLOMAX) 0.4 mg Cap, Take 1 capsule (0.4 mg total) by mouth once daily., Disp: 90 capsule, Rfl: 3    timolol maleate 0.5% (TIMOPTIC) 0.5 % Drop, Place into both eyes., Disp: , Rfl:     valsartan (DIOVAN) 320 MG tablet, Take 320 mg by mouth., Disp: , Rfl:     vitamin E 100 UNIT capsule, Take 100 Units by mouth once daily., Disp: , Rfl:     cholecalciferol, vitamin D3, (VITAMIN D3) 125 mcg (5,000 unit) Tab, Take 2 tablets (10,000 Units total) by mouth 3 (three) times a week. Start after taking Vitamin D 50,000 units is complete., Disp: 60 tablet, Rfl: 0    ergocalciferol (ERGOCALCIFEROL) 50,000 unit Cap, Take 1 capsule (50,000 Units total) by mouth every 7 days. Take for 6 weeks., Disp: 6 capsule, Rfl: 0    ferrous sulfate (FEOSOL) 325 mg (65 mg iron) Tab tablet, Take 1 tablet (325 mg total) by mouth 3 (three) times daily. (Patient not taking: Reported on 11/9/2023), Disp: 90 tablet, Rfl: 1  No current facility-administered medications for this visit.    Facility-Administered Medications Ordered in Other Visits:     0.9%  NaCl infusion, , Intravenous, Continuous, Jesse Smalls MD, Last Rate: 50 mL/hr at 08/15/23 0714, New Bag at 08/15/23 0714           Review of Systems             Vitals:    01/09/24 0839   BP: 118/66   BP Location: Left arm   Patient Position: Sitting   BP Method: Large (Automatic)   Pulse: 62   Resp: 17   Temp: 97.3 °F (36.3 °C)   TempSrc: Temporal   SpO2: 98%   Weight: 89.4 kg (197 lb 3.2 oz)   Height: 5' 11" (1.803 m)        Physical Exam  Vitals and nursing note reviewed.   Constitutional:       " Appearance: Normal appearance.   Cardiovascular:      Rate and Rhythm: Normal rate and regular rhythm.      Pulses: Normal pulses.      Heart sounds: Normal heart sounds.   Pulmonary:      Effort: Pulmonary effort is normal.      Breath sounds: Normal breath sounds.   Abdominal:      General: Abdomen is flat. Bowel sounds are normal.      Palpations: Abdomen is soft.   Musculoskeletal:         General: Normal range of motion.   Skin:     General: Skin is warm and dry.   Neurological:      General: No focal deficit present.      Mental Status: He is alert and oriented to person, place, and time. Mental status is at baseline.           Last Labs:     No visits with results within 1 Month(s) from this visit.   Latest known visit with results is:   Lab Visit on 11/09/2023   Component Date Value    Creatinine, Urine 11/09/2023 162     Microalbumin 11/09/2023 3.1 (H)     Microalbumin/Creatinine * 11/09/2023 19.1     Creatinine, Urine 11/09/2023 159     Protein, Urine 11/09/2023 16.7 (H)     Protein/Creatinine Ratio 11/09/2023 0.105     PTH 11/09/2023 159.00 (H)     Sodium 11/09/2023 140     Potassium 11/09/2023 4.6     Chloride 11/09/2023 110 (H)     CO2 11/09/2023 25     Anion Gap 11/09/2023 10     Glucose 11/09/2023 120 (H)     BUN 11/09/2023 33 (H)     Creatinine 11/09/2023 2.50 (H)     BUN/Creatinine Ratio 11/09/2023 13     Calcium 11/09/2023 9.2     Albumin 11/09/2023 3.7     Phosphorus 11/09/2023 3.5     eGFR 11/09/2023 27 (L)     Color, UA 11/09/2023 Light-Yellow     Clarity, UA 11/09/2023 Clear     pH, UA 11/09/2023 5.5     Leukocytes, UA 11/09/2023 Negative     Nitrites, UA 11/09/2023 Negative     Protein, UA 11/09/2023 Negative     Glucose, UA 11/09/2023 Normal     Ketones, UA 11/09/2023 Negative     Urobilinogen, UA 11/09/2023 Normal     Bilirubin, UA 11/09/2023 Negative     Blood, UA 11/09/2023 Negative     Specific Gravity, UA 11/09/2023 1.015     Vitamin D 25-Hydroxy, Bl* 11/09/2023 20.2     WBC 11/09/2023  6.74     RBC 11/09/2023 3.82 (L)     Hemoglobin 11/09/2023 10.6 (L)     Hematocrit 11/09/2023 30.8 (L)     MCV 11/09/2023 80.6     MCH 11/09/2023 27.7     MCHC 11/09/2023 34.4     RDW 11/09/2023 14.7 (H)     Platelet Count 11/09/2023 258     MPV 11/09/2023 11.1     Neutrophils % 11/09/2023 69.0 (H)     Lymphocytes % 11/09/2023 22.4 (L)     Monocytes % 11/09/2023 5.6     Eosinophils % 11/09/2023 2.2     Basophils % 11/09/2023 0.4     Immature Granulocytes % 11/09/2023 0.4     nRBC, Auto 11/09/2023 0.0     Neutrophils, Abs 11/09/2023 4.64     Lymphocytes, Absolute 11/09/2023 1.51     Monocytes, Absolute 11/09/2023 0.38     Eosinophils, Absolute 11/09/2023 0.15     Basophils, Absolute 11/09/2023 0.03     Immature Granulocytes, A* 11/09/2023 0.03     nRBC, Absolute 11/09/2023 0.00     Diff Type 11/09/2023 Auto        Last Imaging:  US Abdomen Complete  Narrative: EXAMINATION:  US ABDOMEN COMPLETE    CLINICAL HISTORY:  Disorder of kidney and ureter, unspecified    TECHNIQUE:  Complete abdominal ultrasound (including pancreas, liver, gallbladder, common bile duct, spleen, aorta, IVC, and kidneys) was performed.    COMPARISON:  2021    FINDINGS:  Liver: Normal in size, measuring 16 cm. Homogeneous echotexture.  No focal hepatic lesions.    Gallbladder: No calculi, wall thickening, or pericholecystic fluid.  No sonographic Peng's sign.    Biliary system: The common duct is not dilated, measuring 4-5 mm.  No intrahepatic ductal dilatation.    Spleen: Normal in size with a homogeneous echotexture, measuring 9 cm.    Pancreas: The visualized portions of pancreas appear normal.    Right kidney: Normal in size with no hydronephrosis, measuring 10.5 cm.    Left kidney:  Normal in size with no hydronephrosis, measuring 11 cm.    Aorta: No aneurysm.    Inferior vena cava: Normal in appearance.    Miscellaneous: No ascites.  Impression: No acute findings.  No hydronephrosis.    Electronically signed by: Channing  Deondre  Date:    07/31/2023  Time:    09:31         **Labs and x-rays personally reviewed by me    ** reviewed      Objective:        Assessment:       1. Stage 3a chronic kidney disease        2. Vitamin D deficiency, unspecified  ergocalciferol (ERGOCALCIFEROL) 50,000 unit Cap           Plan:         1. Stage 3a chronic kidney disease    2. Vitamin D deficiency, unspecified  -     ergocalciferol (ERGOCALCIFEROL) 50,000 unit Cap; Take 1 capsule (50,000 Units total) by mouth every 7 days. Take for 6 weeks.  Dispense: 6 capsule; Refill: 0    Other orders  -     cholecalciferol, vitamin D3, (VITAMIN D3) 125 mcg (5,000 unit) Tab; Take 2 tablets (10,000 Units total) by mouth 3 (three) times a week. Start after taking Vitamin D 50,000 units is complete.  Dispense: 60 tablet; Refill: 0

## 2024-01-19 ENCOUNTER — OFFICE VISIT (OUTPATIENT)
Dept: GASTROENTEROLOGY | Facility: CLINIC | Age: 74
End: 2024-01-19
Payer: MEDICARE

## 2024-01-19 VITALS
HEART RATE: 64 BPM | HEIGHT: 71 IN | SYSTOLIC BLOOD PRESSURE: 107 MMHG | WEIGHT: 201.63 LBS | BODY MASS INDEX: 28.23 KG/M2 | DIASTOLIC BLOOD PRESSURE: 52 MMHG

## 2024-01-19 DIAGNOSIS — D12.3 ADENOMATOUS POLYP OF TRANSVERSE COLON: Primary | ICD-10-CM

## 2024-01-19 DIAGNOSIS — D47.2 MONOCLONAL GAMMOPATHY: ICD-10-CM

## 2024-01-19 DIAGNOSIS — D12.2 ADENOMATOUS POLYP OF ASCENDING COLON: ICD-10-CM

## 2024-01-19 DIAGNOSIS — D12.4 ADENOMATOUS POLYP OF DESCENDING COLON: ICD-10-CM

## 2024-01-19 PROCEDURE — 99214 OFFICE O/P EST MOD 30 MIN: CPT | Mod: S$PBB,,, | Performed by: NURSE PRACTITIONER

## 2024-01-19 PROCEDURE — 1159F MED LIST DOCD IN RCRD: CPT | Mod: CPTII,,, | Performed by: NURSE PRACTITIONER

## 2024-01-19 PROCEDURE — 3078F DIAST BP <80 MM HG: CPT | Mod: CPTII,,, | Performed by: NURSE PRACTITIONER

## 2024-01-19 PROCEDURE — 99215 OFFICE O/P EST HI 40 MIN: CPT | Mod: PBBFAC | Performed by: NURSE PRACTITIONER

## 2024-01-19 PROCEDURE — 3008F BODY MASS INDEX DOCD: CPT | Mod: CPTII,,, | Performed by: NURSE PRACTITIONER

## 2024-01-19 PROCEDURE — 3288F FALL RISK ASSESSMENT DOCD: CPT | Mod: CPTII,,, | Performed by: NURSE PRACTITIONER

## 2024-01-19 PROCEDURE — 3074F SYST BP LT 130 MM HG: CPT | Mod: CPTII,,, | Performed by: NURSE PRACTITIONER

## 2024-01-19 PROCEDURE — 1101F PT FALLS ASSESS-DOCD LE1/YR: CPT | Mod: CPTII,,, | Performed by: NURSE PRACTITIONER

## 2024-01-19 NOTE — PROGRESS NOTES
Sami Kerns is a 74 y.o. male here for Follow-up        PCP: Michael Weems  Referring Provider: No referring provider defined for this encounter.     HPI:  Presents for six-month follow-up.  Patient has a history of anemia with monoclonal gammopathy.  Patient is being followed by Dr. Arenas.  Reports that he does have an appointment soon with Dr. Arenas for an office visit and also labs and imaging. Records are not currently available for review. Patient is also followed by nephrology. EGD/Dilation on 05/16/2022, 2 cm hiatal hernia.  He denies dysphagia today.  No hematochezia or melena.  Reports that his bowels are moving without difficulty.  Last colonoscopy was 11/29/2021, recommendation to repeat colonoscopy in 3 years.  He will be due in November of 2024.  Reports that Dr. Arenas did discontinue oral iron therapy.  Reports occasional constipation.  States that fruit added to his diet does improve his bowel movements.  Also advised that he may use MiraLax powder if needed for constipation.    Follow-up  Pertinent negatives include no abdominal pain, change in bowel habit, chest pain, coughing, fatigue, fever, nausea or vomiting.         ROS:  Review of Systems   Constitutional:  Negative for activity change, appetite change, fatigue, fever and unexpected weight change.   HENT:  Negative for trouble swallowing.    Respiratory:  Negative for cough.    Cardiovascular:  Negative for chest pain.   Gastrointestinal:  Negative for abdominal distention, abdominal pain, blood in stool, change in bowel habit, constipation, diarrhea, nausea, vomiting and reflux.   Musculoskeletal:  Negative for gait problem.   Integumentary:  Negative for color change.   Psychiatric/Behavioral:  Negative for sleep disturbance. The patient is not nervous/anxious.           PMHX:  has a past medical history of Adenomatous polyp of ascending colon (11/29/2021), Adenomatous polyp of descending colon (11/29/2021), Adenomatous  polyp of transverse colon (11/29/2021), Anticoagulant long-term use, Blood in stool (11/29/2021), CAD (coronary artery disease), CHF (congestive heart failure), COPD (chronic obstructive pulmonary disease), GERD (gastroesophageal reflux disease), Gout, HH (hiatus hernia) (05/16/2022), HTN (hypertension), Hyperlipidemia, Iron deficiency anemia due to chronic blood loss (11/29/2021), Orchitis (01/20/2022), Osteoarthritis, Polyp of splenic flexure of colon (11/29/2021), Screening for colon cancer (11/29/2021), Urinary incontinence (01/20/2022), and Vocal cord polyps.    PSHX:  has a past surgical history that includes Coronary Artery Bypass Graft (CABG); Tonsillectomy (N/A); Direct laryngoscopy (Bilateral, 05/10/2022); Vocal fold lesion excision (Bilateral, 05/10/2022); Direct laryngoscopy (N/A, 11/11/2022); Vocal fold lesion excision (N/A, 11/11/2022); Direct laryngoscopy (N/A, 8/15/2023); and Vocal fold lesion excision (Right, 8/15/2023).    PFHX: family history includes Hypertension in his mother.    PSlHX:  reports that he has been smoking cigarettes. He has a 2.8 pack-year smoking history. He has been exposed to tobacco smoke. He has never used smokeless tobacco. He reports current alcohol use. He reports that he does not use drugs.        Review of patient's allergies indicates:  No Known Allergies    Medication List with Changes/Refills   Current Medications    ALBUTEROL (VENTOLIN HFA) 90 MCG/ACTUATION INHALER    Inhale 2 puffs into the lungs every 6 (six) hours as needed for Wheezing. Rescue    AMIODARONE (PACERONE) 200 MG TAB    Take by mouth once daily.    ASCORBIC ACID, VITAMIN C, (VITAMIN C) 1000 MG TABLET    Take 1,000 mg by mouth once daily.    ASPIRIN (ECOTRIN) 81 MG EC TABLET    Take 81 mg by mouth once daily.    ATORVASTATIN (LIPITOR) 80 MG TABLET    Take 1 tablet by mouth once daily.    CHOLECALCIFEROL, VITAMIN D3, (VITAMIN D3) 125 MCG (5,000 UNIT) TAB    Take 2 tablets (10,000 Units total) by mouth 3  "(three) times a week. Start after taking Vitamin D 50,000 units is complete.    CILOSTAZOL (PLETAL) 100 MG TAB    Take 100 mg by mouth 2 (two) times daily.    CLOPIDOGREL (PLAVIX) 75 MG TABLET    Take 1 tablet (75 mg total) by mouth once daily.    DOXYCYCLINE (VIBRAMYCIN) 100 MG CAP    Take 1 capsule (100 mg total) by mouth 2 (two) times a day.    ERGOCALCIFEROL (ERGOCALCIFEROL) 50,000 UNIT CAP    Take 1 capsule (50,000 Units total) by mouth every 7 days. Take for 6 weeks.    EZETIMIBE (ZETIA) 10 MG TABLET    Take 10 mg by mouth once daily.    FERROUS SULFATE (FEOSOL) 325 MG (65 MG IRON) TAB TABLET    Take 1 tablet (325 mg total) by mouth 3 (three) times daily.    FUROSEMIDE (LASIX) 40 MG TABLET    Take 40 mg by mouth once daily.    HYDRALAZINE (APRESOLINE) 50 MG TABLET    Take 1 tablet (50 mg total) by mouth 3 (three) times daily.    HYDROCODONE-ACETAMINOPHEN (NORCO) 7.5-325 MG PER TABLET    Take 1 tablet by mouth daily as needed for Pain.    ISOSORBIDE MONONITRATE (IMDUR) 30 MG 24 HR TABLET    Take 15 mg by mouth.    LATANOPROST 0.005 % OPHTHALMIC SOLUTION    Place 1 drop into both eyes every evening.    LOSARTAN (COZAAR) 50 MG TABLET    Take 2 tablets (100 mg total) by mouth once daily.    METOPROLOL SUCCINATE (TOPROL-XL) 50 MG 24 HR TABLET    Take 1 tablet by mouth once daily.    NAPROXEN (NAPROSYN) 500 MG TABLET    TAKE ONE TABLET BY MOUTH TWICE DAILY AS NEEDED    NITROGLYCERIN (NITROSTAT) 0.4 MG SL TABLET    Place under the tongue.    TAMSULOSIN (FLOMAX) 0.4 MG CAP    Take 1 capsule (0.4 mg total) by mouth once daily.    TIMOLOL MALEATE 0.5% (TIMOPTIC) 0.5 % DROP    Place into both eyes.    VALSARTAN (DIOVAN) 320 MG TABLET    Take 320 mg by mouth.    VITAMIN E 100 UNIT CAPSULE    Take 100 Units by mouth once daily.        Objective Findings:  Vital Signs:  BP (!) 107/52 (BP Location: Right arm)   Pulse 64   Ht 5' 11" (1.803 m)   Wt 91.4 kg (201 lb 9.6 oz)   BMI 28.12 kg/m²  Body mass index is 28.12 " kg/m².    Physical Exam:  Physical Exam  Vitals and nursing note reviewed.   Constitutional:       General: He is not in acute distress.     Appearance: Normal appearance.   HENT:      Mouth/Throat:      Mouth: Mucous membranes are moist.   Cardiovascular:      Rate and Rhythm: Normal rate.   Pulmonary:      Effort: Pulmonary effort is normal.      Breath sounds: No wheezing, rhonchi or rales.   Abdominal:      General: Bowel sounds are normal. There is no distension.      Palpations: Abdomen is soft. There is no mass.      Tenderness: There is no abdominal tenderness. There is no guarding.   Skin:     General: Skin is warm and dry.      Coloration: Skin is not jaundiced or pale.   Neurological:      Mental Status: He is alert and oriented to person, place, and time.   Psychiatric:         Mood and Affect: Mood normal.          Labs:  Lab Results   Component Value Date    WBC 6.74 11/09/2023    HGB 10.6 (L) 11/09/2023    HCT 30.8 (L) 11/09/2023    MCV 80.6 11/09/2023    RDW 14.7 (H) 11/09/2023     11/09/2023    LYMPH 22.4 (L) 11/09/2023    LYMPH 1.51 11/09/2023    MONO 5.6 11/09/2023    EOS 0.15 11/09/2023    BASO 0.03 11/09/2023     Lab Results   Component Value Date     11/09/2023    K 4.6 11/09/2023     (H) 11/09/2023    CO2 25 11/09/2023     (H) 11/09/2023    BUN 33 (H) 11/09/2023    CREATININE 2.50 (H) 11/09/2023    CALCIUM 9.2 11/09/2023    PROT 8.3 (H) 07/19/2023    ALBUMIN 3.7 11/09/2023    BILITOT 0.4 07/19/2023    ALKPHOS 109 07/19/2023    AST 40 (H) 07/19/2023    ALT 85 (H) 07/19/2023         Imaging: No results found.      Assessment:  Sami Kerns is a 74 y.o. male here with:  1. Adenomatous polyp of transverse colon    2. Adenomatous polyp of descending colon    3. Adenomatous polyp of ascending colon    4. Monoclonal gammopathy          Recommendations:  1. Colonoscopy in November 2024  2. Request records from Dr. Arenas  3. Keep upcoming appointment with Dr. Arenas for  labs and imaging due to anemia and monoclonal gammopathy  4. Increase fiber in diet 30 grams daily  5. May add Miralax powder 17 grams in 8 ounces liquid     Follow up in about 6 months (around 7/19/2024).      Order summary:       Thank you for allowing me to participate in the care of Sami Kerns.      JAYNE SuC

## 2024-01-31 ENCOUNTER — EXTERNAL CHRONIC CARE MANAGEMENT (OUTPATIENT)
Dept: FAMILY MEDICINE | Facility: CLINIC | Age: 74
End: 2024-01-31
Payer: MEDICARE

## 2024-01-31 PROCEDURE — G0511 CCM/BHI BY RHC/FQHC 20MIN MO: HCPCS | Mod: ,,, | Performed by: INTERNAL MEDICINE

## 2024-02-20 ENCOUNTER — TELEPHONE (OUTPATIENT)
Dept: NEPHROLOGY | Facility: CLINIC | Age: 74
End: 2024-02-20
Payer: MEDICARE

## 2024-02-20 NOTE — TELEPHONE ENCOUNTER
----- Message from Lynda Wade sent at 2/20/2024  9:40 AM CST -----  Aziz with [employer] calling to get latest kidney function lab results  - call back # [phone] - states you can either call with results or fax - states his call back and fax # are the same

## 2024-02-29 ENCOUNTER — EXTERNAL CHRONIC CARE MANAGEMENT (OUTPATIENT)
Dept: FAMILY MEDICINE | Facility: CLINIC | Age: 74
End: 2024-02-29
Payer: MEDICARE

## 2024-02-29 PROCEDURE — G0511 CCM/BHI BY RHC/FQHC 20MIN MO: HCPCS | Mod: ,,, | Performed by: INTERNAL MEDICINE

## 2024-03-11 ENCOUNTER — OFFICE VISIT (OUTPATIENT)
Dept: NEPHROLOGY | Facility: CLINIC | Age: 74
End: 2024-03-11
Payer: MEDICARE

## 2024-03-11 VITALS
BODY MASS INDEX: 28 KG/M2 | OXYGEN SATURATION: 96 % | RESPIRATION RATE: 16 BRPM | HEIGHT: 71 IN | HEART RATE: 61 BPM | DIASTOLIC BLOOD PRESSURE: 52 MMHG | SYSTOLIC BLOOD PRESSURE: 112 MMHG | WEIGHT: 200 LBS

## 2024-03-11 DIAGNOSIS — E55.9 VITAMIN D DEFICIENCY, UNSPECIFIED: ICD-10-CM

## 2024-03-11 DIAGNOSIS — I12.9 HYPERTENSIVE NEPHROSCLEROSIS, STAGE 1 THROUGH STAGE 4 OR UNSPECIFIED CHRONIC KIDNEY DISEASE: ICD-10-CM

## 2024-03-11 DIAGNOSIS — I10 ESSENTIAL HYPERTENSION: ICD-10-CM

## 2024-03-11 DIAGNOSIS — N18.4 CKD (CHRONIC KIDNEY DISEASE) STAGE 4, GFR 15-29 ML/MIN: Primary | ICD-10-CM

## 2024-03-11 PROCEDURE — 1159F MED LIST DOCD IN RCRD: CPT | Mod: CPTII,,, | Performed by: INTERNAL MEDICINE

## 2024-03-11 PROCEDURE — 1101F PT FALLS ASSESS-DOCD LE1/YR: CPT | Mod: CPTII,,, | Performed by: INTERNAL MEDICINE

## 2024-03-11 PROCEDURE — 3078F DIAST BP <80 MM HG: CPT | Mod: CPTII,,, | Performed by: INTERNAL MEDICINE

## 2024-03-11 PROCEDURE — 99215 OFFICE O/P EST HI 40 MIN: CPT | Mod: PBBFAC | Performed by: INTERNAL MEDICINE

## 2024-03-11 PROCEDURE — 3066F NEPHROPATHY DOC TX: CPT | Mod: CPTII,,, | Performed by: INTERNAL MEDICINE

## 2024-03-11 PROCEDURE — 1126F AMNT PAIN NOTED NONE PRSNT: CPT | Mod: CPTII,,, | Performed by: INTERNAL MEDICINE

## 2024-03-11 PROCEDURE — 3074F SYST BP LT 130 MM HG: CPT | Mod: CPTII,,, | Performed by: INTERNAL MEDICINE

## 2024-03-11 PROCEDURE — 3288F FALL RISK ASSESSMENT DOCD: CPT | Mod: CPTII,,, | Performed by: INTERNAL MEDICINE

## 2024-03-11 PROCEDURE — 99214 OFFICE O/P EST MOD 30 MIN: CPT | Mod: S$PBB,,, | Performed by: INTERNAL MEDICINE

## 2024-03-11 PROCEDURE — 3008F BODY MASS INDEX DOCD: CPT | Mod: CPTII,,, | Performed by: INTERNAL MEDICINE

## 2024-03-11 RX ORDER — PREDNISONE 20 MG/1
20 TABLET ORAL
COMMUNITY
Start: 2024-01-11 | End: 2024-03-27 | Stop reason: SDUPTHER

## 2024-03-11 RX ORDER — HYDROCODONE BITARTRATE AND ACETAMINOPHEN 5; 325 MG/1; MG/1
TABLET ORAL
COMMUNITY
Start: 2024-02-19

## 2024-03-11 NOTE — PROGRESS NOTES
[facility] History and Physical  Patient Name: Sami Kerns  MRN: [id number]  Age: 74 y.o.  : 1950    Date: 3/11/2024 2:25 PM    Chief Complaint: Follow Up    HPI :   Mr Kerns presents to Nephrology clinic for follow up. He is followed by Dr. Michael Weems MD as her primary care provider.     HTN: diagnosed about 15 years ago. Current regimen includes lasix 40 mg daily, losartan 50 mg daily, metoprolol 50 mg daily, valsartan 320 mg daily, hydralazine 50 mg BID. BP low today,. No s/s hypotension     CAD s/p CABG in 2017/CHF. ASA. Follows with Dr. Celina Melchor     Monoclonal gammopathy: He is followed by Dr. Arenas.     Nephrology history: FH of kidney disease- sister.  No nephrolithiasis, or recurrent UTIs. Takes Naproxen 500 mg as needed. Takes OTC ibuprofen as well.    Patient denies any CP, SOB, peripheral edema, dysuria, hematuria, changes in urinary habits, or increased frequency of urination.     Past Medical History:  has a past medical history of Adenomatous polyp of ascending colon (2021), Adenomatous polyp of descending colon (2021), Adenomatous polyp of transverse colon (2021), Anticoagulant long-term use, Blood in stool (2021), CAD (coronary artery disease), CHF (congestive heart failure), COPD (chronic obstructive pulmonary disease), GERD (gastroesophageal reflux disease), Gout, HH (hiatus hernia) (2022), HTN (hypertension), Hyperlipidemia, Iron deficiency anemia due to chronic blood loss (2021), Orchitis (2022), Osteoarthritis, Polyp of splenic flexure of colon (2021), Screening for colon cancer (2021), Urinary incontinence (2022), and Vocal cord polyps.     Past Surgical History:   has a past surgical history that includes Coronary Artery Bypass Graft (CABG); Tonsillectomy (N/A); Direct laryngoscopy (Bilateral, 05/10/2022); Vocal fold lesion excision (Bilateral,  05/10/2022); Direct laryngoscopy (N/A, 11/11/2022); Vocal fold lesion excision (N/A, 11/11/2022); Direct laryngoscopy (N/A, 8/15/2023); and Vocal fold lesion excision (Right, 8/15/2023).     Family History:  family history includes Hypertension in his mother. No family history of kidney disease.     Social History:   reports that he has been smoking cigarettes. He has a 2.8 pack-year smoking history. He has been exposed to tobacco smoke. He has never used smokeless tobacco. He reports current alcohol use. He reports that he does not use drugs.     Allergies: has No Known Allergies.     Medications: Reviewed including OTC medications, herbal supplements, and NSAIDS.     Old records have been reviewed.      Review of Systems:  ROS: A 10 point ROS was completed and found to be negative except for that mentioned above.          Physical Exam:  Vitals:    03/11/24 1331   BP: (!) 112/52   Pulse: 61   Resp: 16         Constitutional: sitting in chair, in NAD  Eyes: EOMI, white sclera  ENMT: moist mucus membranes, nares patent  Cardiovascular: normal rate, S1/S2 noted, no edema  Respiratory: symmetrical chest expansion, CTA-B  Gastrointestinal: +BS, soft, NT/ND  Musculoskeletal: normal, no joint erythema/effusions  Skin: no rash, no purpura, warm extremities  Neurological: Alert and Oriented x 4, afocal    Labs:   Lab Results   Component Value Date     11/09/2023    K 4.6 11/09/2023    CREATININE 2.50 (H) 11/09/2023    ALT 85 (H) 07/19/2023    AST 40 (H) 07/19/2023    LDLCALC 27 04/03/2023    CHOL 106 04/03/2023    HDL 38 (L) 04/03/2023    TRIG 204 (H) 04/03/2023    TSH 0.009 (L) 04/12/2022    WBC 6.74 11/09/2023    HGB 10.6 (L) 11/09/2023    HCT 30.8 (L) 11/09/2023     11/09/2023    PSA 1.880 04/03/2023        Otherwise Reviewed    Assessment/Plan:       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 Baseline sCr TBD today sCr pending. Counseled to avoid nephrotoxic agents  such as NSAIDs.     Proteinuria: urine Prot:Creat ratio is pending. Patient is on RAAS blockade with ARB    Anemia: CBC pending. Follows with Heme/Onc    BMD: Calcium and Phosphorus PTH, Vit D pending    HTN: well controlled with current meds . If he has low BP, I recommended stopping hydralazine. He says it typically is not low    RTC 4 months with CBC, RFP, UA, urine for Prot:creat ratio, PTH, Vit D      Alisha S. Parker, DO [facility]   03/11/2024

## 2024-03-12 ENCOUNTER — TELEPHONE (OUTPATIENT)
Dept: NEPHROLOGY | Facility: CLINIC | Age: 74
End: 2024-03-12
Payer: MEDICARE

## 2024-03-12 NOTE — TELEPHONE ENCOUNTER
----- Message from Tana Serrano DO sent at 3/12/2024 10:17 AM CDT -----  Potassium high. Recommend low K diet. Stop K supplements if he is on any. Renal function stable. Mild [facility]. Recommend mens MV with iron daily. TY

## 2024-03-23 DIAGNOSIS — N13.8 BPH WITH OBSTRUCTION/LOWER URINARY TRACT SYMPTOMS: Chronic | ICD-10-CM

## 2024-03-23 DIAGNOSIS — N40.1 BPH WITH OBSTRUCTION/LOWER URINARY TRACT SYMPTOMS: Chronic | ICD-10-CM

## 2024-03-25 NOTE — PATIENT INSTRUCTIONS
"Patient Education       Urinary Incontinence in Men   The Basics   Written by the doctors and editors at [facility]   What is urinary incontinence? -- "Urinary incontinence" is the term doctors use when a person leaks urine or loses bladder control. Incontinence is a very common problem, but it is not a normal part of aging. If you have urinary incontinence, you do not have to "just live with it." There are treatments and things you can do on your own to stop or reduce urine leaks.  What are the symptoms of urinary incontinence? -- There are different types of urinary incontinence. Each causes different symptoms. In men, the 4 main types are:  · Stress incontinence - Men with stress incontinence leak urine when they laugh, cough, sneeze, or do anything that "stresses" the belly. Some men get this type of incontinence after having surgery for prostate disease.  · Urgency incontinence - Men with urgency incontinence feel a strong need to urinate all of a sudden. Urgency incontinence is also known as urge incontinence. Often the "urge" is so strong that they can't make it to the bathroom in time. (If you have these sudden urges but do not leak urine, you might have an "overactive bladder." That can also be treated.)  · Mixed incontinence - Men with mixed incontinence have symptoms of both stress and urgency incontinence.  · Incontinence caused by incomplete bladder emptying - Some men cannot fully empty their bladder when they urinate. This can happen if they have a condition called "benign prostatic hyperplasia," which makes the prostate grow larger than normal. An enlarged prostate can block the flow of urine.  Is there anything I can do on my own to feel better? -- Yes. Here are some steps that can help reduce urine leaks:  · If you drink lots of liquids, ask your doctor or nurse if it is okay for you to reduce the amount you drink. This might help, especially in the hours before you go to bed.  · Cut down on any " "foods or drinks that make your symptoms worse. Some people find that alcohol, caffeine, or spicy or acidic foods irritate the bladder.  · Lose weight, if you are overweight.  · If you have diabetes, keep your blood sugar as close to normal as possible.  · If you take medicines called diuretics, keep in mind that these medicines increase the need to urinate. Try to plan ahead and take them when you know you will be near a bathroom for a few hours. If you keep having problems with leaking because of diuretics, ask your doctor if you can take a lower dose or switch to a different medicine.  These techniques can also help with bladder control:  · Bladder retraining - During bladder retraining, you "train" yourself to go to the bathroom at scheduled times, even if you do not feel like you have to go. For instance, you might decide that you will go every hour while you are awake. In that case, you would go to the bathroom every hour, even if you don't feel the need to go. You would also try to wait until a whole hour had passed even if you need to go sooner. Once you get used to this, you try to increase the time between bathroom trips to an hour and 15 minutes. Over time, you might be able to "retrain" your bladder to wait up to 3 or 4 hours between bathroom visits. When you reach 3 or 4 hours, you can stop bladder training.  · Pelvic muscle exercises - Pelvic muscle exercises strengthen the muscles that control the flow of urine. When done right, these exercises can help. But people often do them wrong. Ask your doctor or nurse how to do them right. They might suggest working with a physical therapist who has special training in these exercises.  Should I see my doctor or nurse? -- Yes. Your doctor or nurse can find out what might be causing your incontinence. They can also suggest ways to help the problem.  Ask your doctor or nurse if any of the medicines you take could be causing your symptoms. Some medicines can " "cause incontinence or make symptoms worse.  How is incontinence treated? -- Your treatment options depend on what type of incontinence you have. Some of the treatment options include:  · Medicines to relax the bladder - These medicines can help with urgency incontinence.  · Medicines to improve urine flow - These medicines can help with incontinence related to an enlarged prostate.  · Surgery to (figure 1):   ? Repair the tissues that support the bladder or hold it in place  ? Improve the flow of urine, for example by removing part of the prostate gland  ? Repair the muscles that control urine flow  · Electrical stimulation of the nerves that relax the bladder  · Devices, such as:  ? A "condom catheter," which fits over the penis like a condom and collects urine into a bag that is strapped to the leg.  ? A penis clamp, which squeezes the penis to keep urine from leaking out (this can be used only for a certain amount of time).  What will my life be like? -- Many men with incontinence can recover bladder control or at least reduce the amount of leakage they have. The key is to speak up about it to your doctor or nurse. Then work with them to find a treatment or therapy that helps you. Sometimes it takes a few tries before you find a solution, but the effort is worth it.  All topics are updated as new evidence becomes available and our peer review process is complete.  This topic retrieved from [employer]x on: Sep 21, 2021.  Topic 58354 Version 14.0  Release: 29.4.2 - C29.263  © 2021 UpToDate, Inc. and/or its affiliates. All rights reserved.  figure 1: The male urinary tract     Urine is made by the kidneys. It passes from the kidneys into the bladder through two tubes called the ureters. Then it leaves the bladder through another tube called the urethra.  Graphic 122028 Version 1.0    Consumer Information Use and Disclaimer   This information is not specific medical advice and does not replace information you receive " from your health care provider. This is only a brief summary of general information. It does NOT include all information about conditions, illnesses, injuries, tests, procedures, treatments, therapies, discharge instructions or life-style choices that may apply to you. You must talk with your health care provider for complete information about your health and treatment options. This information should not be used to decide whether or not to accept your health care provider's advice, instructions or recommendations. Only your health care provider has the knowledge and training to provide advice that is right for you. The use of this information is governed by the [employer] End User License Agreement, available at https://www.Feedsky/en/solutions/EdCourage/about/reji.The use of [employer] content is governed by the [employer] Terms of Use. ©2021 [employer] All rights reserved.  Copyright   © 2021 [employer] and/or its affiliates. All rights reserved.     verbal cues/nonverbal cues (demo/gestures)/2 person assist

## 2024-03-26 RX ORDER — TAMSULOSIN HYDROCHLORIDE 0.4 MG/1
1 CAPSULE ORAL
Qty: 90 CAPSULE | Refills: 3 | Status: SHIPPED | OUTPATIENT
Start: 2024-03-26

## 2024-03-27 ENCOUNTER — HOSPITAL ENCOUNTER (OUTPATIENT)
Dept: RADIOLOGY | Facility: HOSPITAL | Age: 74
Discharge: HOME | End: 2024-03-27
Attending: INTERNAL MEDICINE
Payer: MEDICARE

## 2024-03-27 ENCOUNTER — OFFICE VISIT (OUTPATIENT)
Dept: FAMILY MEDICINE | Facility: CLINIC | Age: 74
End: 2024-03-27
Payer: MEDICARE

## 2024-03-27 VITALS
WEIGHT: 194 LBS | RESPIRATION RATE: 17 BRPM | DIASTOLIC BLOOD PRESSURE: 76 MMHG | HEART RATE: 72 BPM | SYSTOLIC BLOOD PRESSURE: 130 MMHG | HEIGHT: 71 IN | TEMPERATURE: 98 F | BODY MASS INDEX: 27.16 KG/M2 | OXYGEN SATURATION: 99 %

## 2024-03-27 DIAGNOSIS — M10.9 GOUT, UNSPECIFIED CAUSE, UNSPECIFIED CHRONICITY, UNSPECIFIED SITE: ICD-10-CM

## 2024-03-27 DIAGNOSIS — M79.604 RIGHT LEG PAIN: ICD-10-CM

## 2024-03-27 DIAGNOSIS — I73.9 PVD (PERIPHERAL VASCULAR DISEASE): ICD-10-CM

## 2024-03-27 DIAGNOSIS — I73.9 PVD (PERIPHERAL VASCULAR DISEASE): Primary | ICD-10-CM

## 2024-03-27 LAB
BASOPHILS # BLD AUTO: 0.04 K/UL (ref 0–0.2)
BASOPHILS NFR BLD AUTO: 0.6 % (ref 0–1)
DIFFERENTIAL METHOD BLD: ABNORMAL
EOSINOPHIL # BLD AUTO: 0.35 K/UL (ref 0–0.5)
EOSINOPHIL NFR BLD AUTO: 5.2 % (ref 1–4)
ERYTHROCYTE [DISTWIDTH] IN BLOOD BY AUTOMATED COUNT: 14.9 % (ref 11.5–14.5)
ERYTHROCYTE [SEDIMENTATION RATE] IN BLOOD BY WESTERGREN METHOD: 69 MM/HR (ref 0–20)
HCT VFR BLD AUTO: 36.9 % (ref 40–54)
HGB BLD-MCNC: 12.4 G/DL (ref 13.5–18)
IMM GRANULOCYTES # BLD AUTO: 0.02 K/UL (ref 0–0.04)
IMM GRANULOCYTES NFR BLD: 0.3 % (ref 0–0.4)
LYMPHOCYTES # BLD AUTO: 1.78 K/UL (ref 1–4.8)
LYMPHOCYTES NFR BLD AUTO: 26.6 % (ref 27–41)
MCH RBC QN AUTO: 27.4 PG (ref 27–31)
MCHC RBC AUTO-ENTMCNC: 33.6 G/DL (ref 32–36)
MCV RBC AUTO: 81.5 FL (ref 80–96)
MONOCYTES # BLD AUTO: 0.59 K/UL (ref 0–0.8)
MONOCYTES NFR BLD AUTO: 8.8 % (ref 2–6)
MPC BLD CALC-MCNC: 11.4 FL (ref 9.4–12.4)
NEUTROPHILS # BLD AUTO: 3.91 K/UL (ref 1.8–7.7)
NEUTROPHILS NFR BLD AUTO: 58.5 % (ref 53–65)
NRBC # BLD AUTO: 0 X10E3/UL
NRBC, AUTO (.00): 0 %
PLATELET # BLD AUTO: 239 K/UL (ref 150–400)
RBC # BLD AUTO: 4.53 M/UL (ref 4.6–6.2)
URATE SERPL-MCNC: 8.9 MG/DL (ref 3.5–7.2)
WBC # BLD AUTO: 6.69 K/UL (ref 4.5–11)

## 2024-03-27 PROCEDURE — 93971 EXTREMITY STUDY: CPT | Mod: TC,RT

## 2024-03-27 PROCEDURE — 1101F PT FALLS ASSESS-DOCD LE1/YR: CPT | Mod: ,,, | Performed by: INTERNAL MEDICINE

## 2024-03-27 PROCEDURE — 84550 ASSAY OF BLOOD/URIC ACID: CPT | Mod: ,,, | Performed by: CLINICAL MEDICAL LABORATORY

## 2024-03-27 PROCEDURE — 3066F NEPHROPATHY DOC TX: CPT | Mod: ,,, | Performed by: INTERNAL MEDICINE

## 2024-03-27 PROCEDURE — 93971 EXTREMITY STUDY: CPT | Mod: 26,RT,, | Performed by: RADIOLOGY

## 2024-03-27 PROCEDURE — 99214 OFFICE O/P EST MOD 30 MIN: CPT | Mod: 25,,, | Performed by: INTERNAL MEDICINE

## 2024-03-27 PROCEDURE — 85025 COMPLETE CBC W/AUTO DIFF WBC: CPT | Mod: ,,, | Performed by: CLINICAL MEDICAL LABORATORY

## 2024-03-27 PROCEDURE — 3075F SYST BP GE 130 - 139MM HG: CPT | Mod: ,,, | Performed by: INTERNAL MEDICINE

## 2024-03-27 PROCEDURE — 3078F DIAST BP <80 MM HG: CPT | Mod: ,,, | Performed by: INTERNAL MEDICINE

## 2024-03-27 PROCEDURE — 96372 THER/PROPH/DIAG INJ SC/IM: CPT | Mod: ,,, | Performed by: INTERNAL MEDICINE

## 2024-03-27 PROCEDURE — 3060F POS MICROALBUMINURIA REV: CPT | Mod: ,,, | Performed by: INTERNAL MEDICINE

## 2024-03-27 PROCEDURE — 4010F ACE/ARB THERAPY RXD/TAKEN: CPT | Mod: ,,, | Performed by: INTERNAL MEDICINE

## 2024-03-27 PROCEDURE — 85651 RBC SED RATE NONAUTOMATED: CPT | Mod: ,,, | Performed by: CLINICAL MEDICAL LABORATORY

## 2024-03-27 PROCEDURE — 3008F BODY MASS INDEX DOCD: CPT | Mod: ,,, | Performed by: INTERNAL MEDICINE

## 2024-03-27 PROCEDURE — 1125F AMNT PAIN NOTED PAIN PRSNT: CPT | Mod: ,,, | Performed by: INTERNAL MEDICINE

## 2024-03-27 PROCEDURE — 3288F FALL RISK ASSESSMENT DOCD: CPT | Mod: ,,, | Performed by: INTERNAL MEDICINE

## 2024-03-27 RX ORDER — PREDNISONE 10 MG/1
10 TABLET ORAL 3 TIMES DAILY
Qty: 9 TABLET | Refills: 0 | Status: SHIPPED | OUTPATIENT
Start: 2024-03-27

## 2024-03-27 RX ORDER — COLCHICINE 0.6 MG/1
0.6 TABLET ORAL 2 TIMES DAILY
Qty: 9 TABLET | Refills: 0 | Status: SHIPPED | OUTPATIENT
Start: 2024-03-27

## 2024-03-27 RX ORDER — KETOROLAC TROMETHAMINE 30 MG/ML
15 INJECTION, SOLUTION INTRAMUSCULAR; INTRAVENOUS
Status: COMPLETED | OUTPATIENT
Start: 2024-03-27 | End: 2024-03-27

## 2024-03-27 RX ORDER — INDOMETHACIN 25 MG/1
25 CAPSULE ORAL 3 TIMES DAILY
Qty: 9 CAPSULE | Refills: 0 | Status: SHIPPED | OUTPATIENT
Start: 2024-03-27 | End: 2024-04-24 | Stop reason: SDUPTHER

## 2024-03-27 RX ADMIN — KETOROLAC TROMETHAMINE 15 MG: 30 INJECTION, SOLUTION INTRAMUSCULAR; INTRAVENOUS at 10:03

## 2024-03-28 ENCOUNTER — OFFICE VISIT (OUTPATIENT)
Dept: FAMILY MEDICINE | Facility: CLINIC | Age: 74
End: 2024-03-28
Payer: MEDICARE

## 2024-03-28 VITALS
WEIGHT: 206 LBS | TEMPERATURE: 98 F | RESPIRATION RATE: 18 BRPM | HEART RATE: 62 BPM | DIASTOLIC BLOOD PRESSURE: 69 MMHG | OXYGEN SATURATION: 98 % | HEIGHT: 71 IN | SYSTOLIC BLOOD PRESSURE: 137 MMHG | BODY MASS INDEX: 28.84 KG/M2

## 2024-03-28 DIAGNOSIS — I73.9 PVD (PERIPHERAL VASCULAR DISEASE): Primary | ICD-10-CM

## 2024-03-28 DIAGNOSIS — M10.9 GOUT, UNSPECIFIED CAUSE, UNSPECIFIED CHRONICITY, UNSPECIFIED SITE: ICD-10-CM

## 2024-03-28 DIAGNOSIS — Z87.891 HISTORY OF TOBACCO USE: ICD-10-CM

## 2024-03-28 PROCEDURE — 3075F SYST BP GE 130 - 139MM HG: CPT | Mod: ,,, | Performed by: INTERNAL MEDICINE

## 2024-03-28 PROCEDURE — 4010F ACE/ARB THERAPY RXD/TAKEN: CPT | Mod: ,,, | Performed by: INTERNAL MEDICINE

## 2024-03-28 PROCEDURE — 3060F POS MICROALBUMINURIA REV: CPT | Mod: ,,, | Performed by: INTERNAL MEDICINE

## 2024-03-28 PROCEDURE — 99212 OFFICE O/P EST SF 10 MIN: CPT | Mod: ,,, | Performed by: INTERNAL MEDICINE

## 2024-03-28 PROCEDURE — 3008F BODY MASS INDEX DOCD: CPT | Mod: ,,, | Performed by: INTERNAL MEDICINE

## 2024-03-28 PROCEDURE — 1101F PT FALLS ASSESS-DOCD LE1/YR: CPT | Mod: ,,, | Performed by: INTERNAL MEDICINE

## 2024-03-28 PROCEDURE — 3066F NEPHROPATHY DOC TX: CPT | Mod: ,,, | Performed by: INTERNAL MEDICINE

## 2024-03-28 PROCEDURE — 3078F DIAST BP <80 MM HG: CPT | Mod: ,,, | Performed by: INTERNAL MEDICINE

## 2024-03-28 PROCEDURE — 1159F MED LIST DOCD IN RCRD: CPT | Mod: ,,, | Performed by: INTERNAL MEDICINE

## 2024-03-28 PROCEDURE — 3288F FALL RISK ASSESSMENT DOCD: CPT | Mod: ,,, | Performed by: INTERNAL MEDICINE

## 2024-03-28 PROCEDURE — 1125F AMNT PAIN NOTED PAIN PRSNT: CPT | Mod: ,,, | Performed by: INTERNAL MEDICINE

## 2024-03-31 ENCOUNTER — EXTERNAL CHRONIC CARE MANAGEMENT (OUTPATIENT)
Dept: FAMILY MEDICINE | Facility: CLINIC | Age: 74
End: 2024-03-31
Payer: MEDICARE

## 2024-03-31 PROCEDURE — G0511 CCM/BHI BY RHC/FQHC 20MIN MO: HCPCS | Mod: ,,, | Performed by: INTERNAL MEDICINE

## 2024-04-01 NOTE — PROGRESS NOTES
"  Sami Kerns presented for a  Medicare AWV and comprehensive Health Risk Assessment today. The following components were reviewed and updated:    Medical history  Family History  Social history  Allergies and Current Medications  Health Risk Assessment  Health Maintenance  Care Team         ** See Completed Assessments for Annual Wellness Visit within the encounter summary.**         The following assessments were completed:  Living Situation  CAGE  Depression Screening  Timed Get Up and Go  Whisper Test  Cognitive Function Screening  Nutrition Screening  ADL Screening  PAQ Screening      Opioid documentation:      Patient does not have a current opioid prescription.        Vitals:    04/08/24 1105   BP: 126/64   BP Location: Right arm   Patient Position: Sitting   Pulse: 65   Resp: 16   Temp: 98.4 °F (36.9 °C)   TempSrc: Oral   SpO2: 97%   Weight: 88.5 kg (195 lb)   Height: 5' 9" (1.753 m)     Body mass index is 28.8 kg/m².  Physical Exam  Vitals reviewed.   Constitutional:       Appearance: Normal appearance.   HENT:      Head: Normocephalic.      Mouth/Throat:      Mouth: Mucous membranes are moist.   Eyes:      Conjunctiva/sclera: Conjunctivae normal.   Cardiovascular:      Rate and Rhythm: Normal rate and regular rhythm.      Pulses: Normal pulses.      Heart sounds: Normal heart sounds. No murmur heard.  Pulmonary:      Effort: Pulmonary effort is normal. No respiratory distress.      Breath sounds: Normal breath sounds.   Abdominal:      General: Bowel sounds are normal.      Palpations: Abdomen is soft.      Tenderness: There is no abdominal tenderness.   Musculoskeletal:         General: Normal range of motion.      Cervical back: Neck supple.   Skin:     General: Skin is warm and dry.   Neurological:      Mental Status: He is alert and oriented to person, place, and time.   Psychiatric:         Mood and Affect: Mood normal.         Behavior: Behavior normal.           Diagnoses and health risks " identified today and associated recommendations/orders:    1. Encounter for subsequent annual wellness visit (AWV) in Medicare patient  Screenings performed, as noted above. Personal preventative testing needs reviewed.  Return for AWV in 12 months or thereafter. Bring all home medications to office visit for medication reconciliation.     2. Essential (primary) hypertension  Chronic - Stable  B/P 126/64 today  Continue isosorbide 30 mg po daily  Continue losartan 50 mg po daily  Continue hydralazine 50 mg po TID  Continue metoprolol 50 mg po daily  Followed by PCP    3. Dyslipidemia  Chronic - Stable  Lipids: Tryg 204, Chol 106, HDL 38, LDL 27  Continue atorvastatin 80 mg po daily  Continue Zetia 10 mg po daily  Followed by PCP    4. Stage 3a chronic kidney disease  Chronic - Stable  BUN/Creat 35/2.68, eGFR 24, Protein/Creat ratio abnormal, Microalbumin/creat ratio abnormal  Avoid NSAIDs (Motrin, Aleve, Ibuprofen)   Followed by Dr. NANDO Serrano (Nephrology)    5. Vitamin D deficiency, unspecified  Chronic - Stable  Vit d level 48.2  Continue vit D 3 5,000 units po daily  Followed by PCP    6. PVD (peripheral vascular disease)  Chronic - stable  Continue cilostazol 100 mg po BID  Continue clopidogrel 75 mg po daily  Followed by PCP    7. BMI 28.0-28.9,adult  Eat a well-balanced diet to include  vegetables, fruit, lean meats, and whole grains.  Activity as tolerated  Followed by PCP    8. Need for vaccination  Followed by PCP    9. Screening for prostate cancer  Previous PSA level 1.880  PSA  Followed by PCP      Provided Sami with a 5-10 year written screening schedule and personal prevention plan. Recommendations were developed using the USPSTF age appropriate recommendations. Education, counseling, and referrals were provided as needed. After Visit Summary printed and given to patient which includes a list of additional screenings\tests needed.    Follow up in about 1 year (around 4/8/2025).    KENYA HAWTHORNE,  RN    Covid vaccine - declined, LDCT ordered - 10/09/2023 - scheduled with appointment given this visit, Influenza vaccine - PATRICIA faxed, Tetanus vaccine - declined, Shingles vaccine - order sent to pharmacy, PSA level - ordered this visit, Lipid panel - ordered this visit, RSV vaccine - VIS (10/19/23) given with instructions to take at pharmacy.     I offered to discuss advanced care planning, including how to pick a person who would make decisions for you if you were unable to make them for yourself, called a health care power of , and what kind of decisions you might make such as use of life sustaining treatments such as ventilators and tube feeding when faced with a life limiting illness recorded on a living will that they will need to know. (How you want to be cared for as you near the end of your natural life)     X Patient is interested in learning more about how to make advanced directives.  I provided them paperwork and offered to discuss this with them.

## 2024-04-02 PROBLEM — M10.9 GOUT: Status: ACTIVE | Noted: 2024-04-02

## 2024-04-02 PROBLEM — M79.604 RIGHT LEG PAIN: Status: ACTIVE | Noted: 2024-04-02

## 2024-04-02 RX ORDER — ALBUTEROL SULFATE 90 UG/1
2 AEROSOL, METERED RESPIRATORY (INHALATION) EVERY 6 HOURS PRN
Qty: 18 G | Refills: 5 | Status: SHIPPED | OUTPATIENT
Start: 2024-04-02

## 2024-04-03 NOTE — PROGRESS NOTES
Subjective:       Patient ID: Sami Kerns is a 74 y.o. male.    Chief Complaint: Foot Pain and Foot Swelling (Right )    HPI  .  Patient complains of excruciating pain feet.  Particularly his right foot is pain at rest he does not have strong palpable pulse the pain is so exquisite to the point where I can hardly touch is foot.  He is clearly having a gout attack and also I think he does have evidence of peripheral vascular disease    Current Medications:    Current Outpatient Medications:     amiodarone (PACERONE) 200 MG Tab, Take by mouth once daily., Disp: , Rfl:     ascorbic acid, vitamin C, (VITAMIN C) 1000 MG tablet, Take 1,000 mg by mouth once daily., Disp: , Rfl:     aspirin (ECOTRIN) 81 MG EC tablet, Take 81 mg by mouth once daily., Disp: , Rfl:     atorvastatin (LIPITOR) 80 MG tablet, Take 1 tablet by mouth once daily., Disp: , Rfl:     cholecalciferol, vitamin D3, (VITAMIN D3) 125 mcg (5,000 unit) Tab, Take 2 tablets (10,000 Units total) by mouth 3 (three) times a week. Start after taking Vitamin D 50,000 units is complete., Disp: 60 tablet, Rfl: 0    cilostazoL (PLETAL) 100 MG Tab, Take 100 mg by mouth 2 (two) times daily., Disp: , Rfl:     clopidogreL (PLAVIX) 75 mg tablet, Take 1 tablet (75 mg total) by mouth once daily., Disp: 90 tablet, Rfl: 1    ergocalciferol (ERGOCALCIFEROL) 50,000 unit Cap, Take 1 capsule (50,000 Units total) by mouth every 7 days. Take for 6 weeks., Disp: 6 capsule, Rfl: 0    ezetimibe (ZETIA) 10 mg tablet, Take 10 mg by mouth once daily., Disp: , Rfl:     ferrous sulfate (FEOSOL) 325 mg (65 mg iron) Tab tablet, Take 1 tablet (325 mg total) by mouth 3 (three) times daily., Disp: 90 tablet, Rfl: 1    furosemide (LASIX) 40 MG tablet, Take 40 mg by mouth once daily., Disp: , Rfl:     hydrALAZINE (APRESOLINE) 50 MG tablet, Take 1 tablet (50 mg total) by mouth 3 (three) times daily., Disp: 90 tablet, Rfl: 1    HYDROcodone-acetaminophen (NORCO) 5-325 mg per tablet, , Disp: , Rfl:      HYDROcodone-acetaminophen (NORCO) 7.5-325 mg per tablet, Take 1 tablet by mouth daily as needed for Pain., Disp: 12 tablet, Rfl: 0    isosorbide mononitrate (IMDUR) 30 MG 24 hr tablet, Take 15 mg by mouth., Disp: , Rfl:     latanoprost 0.005 % ophthalmic solution, Place 1 drop into both eyes every evening., Disp: , Rfl:     losartan (COZAAR) 50 MG tablet, Take 2 tablets (100 mg total) by mouth once daily., Disp: 90 tablet, Rfl: 1    metoprolol succinate (TOPROL-XL) 50 MG 24 hr tablet, Take 1 tablet by mouth once daily., Disp: , Rfl:     naproxen (NAPROSYN) 500 MG tablet, TAKE ONE TABLET BY MOUTH TWICE DAILY AS NEEDED, Disp: 20 tablet, Rfl: 4    nitroGLYCERIN (NITROSTAT) 0.4 MG SL tablet, Place under the tongue., Disp: , Rfl:     tamsulosin (FLOMAX) 0.4 mg Cap, TAKE ONE CAPSULE BY MOUTH EVERY DAY, Disp: 90 capsule, Rfl: 3    timolol maleate 0.5% (TIMOPTIC) 0.5 % Drop, Place into both eyes., Disp: , Rfl:     valsartan (DIOVAN) 320 MG tablet, Take 320 mg by mouth., Disp: , Rfl:     vitamin E 100 UNIT capsule, Take 100 Units by mouth once daily., Disp: , Rfl:     albuterol (PROVENTIL/VENTOLIN HFA) 90 mcg/actuation inhaler, INHALE 2 PUFFS BY MOUTH EVERY 6 HOURS AS NEEDED FOR WHEEZING, Disp: 18 g, Rfl: 5    colchicine (COLCRYS) 0.6 mg tablet, Take 1 tablet (0.6 mg total) by mouth 2 (two) times daily., Disp: 9 tablet, Rfl: 0    doxycycline (VIBRAMYCIN) 100 MG Cap, Take 1 capsule (100 mg total) by mouth 2 (two) times a day. (Patient not taking: Reported on 1/19/2024), Disp: 20 capsule, Rfl: 0    indomethacin (INDOCIN) 25 MG capsule, Take 1 capsule (25 mg total) by mouth 3 (three) times daily., Disp: 9 capsule, Rfl: 0    predniSONE (DELTASONE) 10 MG tablet, Take 1 tablet (10 mg total) by mouth 3 (three) times daily., Disp: 9 tablet, Rfl: 0  No current facility-administered medications for this visit.    Facility-Administered Medications Ordered in Other Visits:     0.9%  NaCl infusion, , Intravenous, Continuous, Slim,  "Jesse SAINI MD, Last Rate: 50 mL/hr at 08/15/23 0714, New Bag at 08/15/23 0714           ROS  Twelve point system reviewed, unremarkable except for stated above in HPI.        Objective:         Vitals:    03/27/24 0915 03/27/24 1020   BP: (!) 169/77 130/76   BP Location: Left arm    Patient Position: Sitting    BP Method: Large (Automatic)    Pulse: 72    Resp: 17    Temp: 97.8 °F (36.6 °C)    TempSrc: Temporal    SpO2: 99%    Weight: 88 kg (194 lb)    Height: 5' 11" (1.803 m)         Physical Exam     Patient is awake alert oriented person place and  Lungs are clear to auscultation bilaterally no crackles or wheezes   Cardiovascular S1-S2 regular rate and rhythm no murmurs rubs or gallops   Abdomen is soft positive bowel sounds nontender, extremities no clubbing cyanosis edema  Neuro no focal neurological deficits  Skin warm and dry.     Last Labs:     Office Visit on 03/27/2024   Component Date Value    ESR Westergren 03/27/2024 69 (H)     Uric Acid 03/27/2024 8.9 (H)     WBC 03/27/2024 6.69     RBC 03/27/2024 4.53 (L)     Hemoglobin 03/27/2024 12.4 (L)     Hematocrit 03/27/2024 36.9 (L)     MCV 03/27/2024 81.5     MCH 03/27/2024 27.4     MCHC 03/27/2024 33.6     RDW 03/27/2024 14.9 (H)     Platelet Count 03/27/2024 239     MPV 03/27/2024 11.4     Neutrophils % 03/27/2024 58.5     Lymphocytes % 03/27/2024 26.6 (L)     Monocytes % 03/27/2024 8.8 (H)     Eosinophils % 03/27/2024 5.2 (H)     Basophils % 03/27/2024 0.6     Immature Granulocytes % 03/27/2024 0.3     nRBC, Auto 03/27/2024 0.0     Neutrophils, Abs 03/27/2024 3.91     Lymphocytes, Absolute 03/27/2024 1.78     Monocytes, Absolute 03/27/2024 0.59     Eosinophils, Absolute 03/27/2024 0.35     Basophils, Absolute 03/27/2024 0.04     Immature Granulocytes, A* 03/27/2024 0.02     nRBC, Absolute 03/27/2024 0.00     Diff Type 03/27/2024 Auto    Lab Visit on 03/11/2024   Component Date Value    Color, UA 03/11/2024 Light-Yellow     Clarity, UA 03/11/2024 Clear     " pH, UA 03/11/2024 5.5     Leukocytes, UA 03/11/2024 Negative     Nitrites, UA 03/11/2024 Negative     Protein, UA 03/11/2024 Negative     Glucose, UA 03/11/2024 Normal     Ketones, UA 03/11/2024 Negative     Urobilinogen, UA 03/11/2024 Normal     Bilirubin, UA 03/11/2024 Negative     Blood, UA 03/11/2024 Negative     Specific Gravity, UA 03/11/2024 1.013     Ferritin 03/11/2024 128     Iron 03/11/2024 42 (L)     Iron Saturation 03/11/2024 16     TIBC 03/11/2024 264     Creatinine, Urine 03/11/2024 125     Microalbumin 03/11/2024 7.7 (H)     Microalbumin/Creatinine * 03/11/2024 61.6 (H)     Creatinine, Urine 03/11/2024 126     Protein, Urine 03/11/2024 18.8 (H)     Protein/Creatinine Ratio 03/11/2024 0.149     PTH 03/11/2024 151.40 (H)     Sodium 03/11/2024 139     Potassium 03/11/2024 5.4 (H)     Chloride 03/11/2024 106     CO2 03/11/2024 25     Anion Gap 03/11/2024 13     Glucose 03/11/2024 124 (H)     BUN 03/11/2024 35 (H)     Creatinine 03/11/2024 2.68 (H)     BUN/Creatinine Ratio 03/11/2024 13     Calcium 03/11/2024 9.3     Albumin 03/11/2024 3.8     Phosphorus 03/11/2024 4.3     eGFR 03/11/2024 24 (L)     Vitamin D 25-Hydroxy, Bl* 03/11/2024 48.2     WBC 03/11/2024 6.22     RBC 03/11/2024 4.38 (L)     Hemoglobin 03/11/2024 12.1 (L)     Hematocrit 03/11/2024 35.6 (L)     MCV 03/11/2024 81.3     MCH 03/11/2024 27.6     MCHC 03/11/2024 34.0     RDW 03/11/2024 15.3 (H)     Platelet Count 03/11/2024 226     MPV 03/11/2024 11.6     Neutrophils % 03/11/2024 56.6     Lymphocytes % 03/11/2024 26.8 (L)     Monocytes % 03/11/2024 8.5 (H)     Eosinophils % 03/11/2024 7.1 (H)     Basophils % 03/11/2024 0.8     Immature Granulocytes % 03/11/2024 0.2     nRBC, Auto 03/11/2024 0.0     Neutrophils, Abs 03/11/2024 3.52     Lymphocytes, Absolute 03/11/2024 1.67     Monocytes, Absolute 03/11/2024 0.53     Eosinophils, Absolute 03/11/2024 0.44     Basophils, Absolute 03/11/2024 0.05     Immature Granulocytes, A* 03/11/2024 0.01      nRBC, Absolute 03/11/2024 0.00     Diff Type 03/11/2024 Auto        Last Imaging:  US Lower Extremity Veins Right  Narrative: EXAMINATION:  US LOWER EXTREMITY VEINS RIGHT    CLINICAL HISTORY:  Peripheral vascular disease, unspecified    COMPARISON:  None.    TECHNIQUE:  Grayscale, spectral, and color Doppler interrogation of the right lower extremity veins was performed. Augmentation and compression was performed.    FINDINGS:  Grayscale, color Doppler, and pulsed Doppler evaluation of the veins of the right lower extremity demonstrate no evidence of deep venous thrombosis.  Impression: No evidence of deep venous thrombosis in the right lower extremity.    Point of Service: [facility]    Electronically signed by: Sheldon Verma  Date:    03/27/2024  Time:    13:41  X-Ray Foot 2 View Right  Narrative: EXAMINATION:  XR FOOT 2 VIEW RIGHT    CLINICAL HISTORY:  Gout, unspecified    TECHNIQUE:  AP and lateral views of the right foot    COMPARISON:  None    FINDINGS:  No fracture detected.  No dislocation.  There are mild degenerative change seen throughout the foot and most notably of the great toe metatarsophalangeal joint.  No erosion or soft tissue prominence.  Impression: As above.    Electronically signed by: Bang Schilling  Date:    03/27/2024  Time:    10:38         **Labs and x-rays personally reviewed by me    ** reviewed           Assessment & Plan:       1. PVD (peripheral vascular disease)  -     Cancel: VAS US Arterial Leg Right; Future  -     US Lower Extremity Veins Right; Future; Expected date: 03/27/2024    2. Right leg pain  -     US Lower Extremity Veins Right; Future; Expected date: 03/27/2024    3. Gout, unspecified cause, unspecified chronicity, unspecified site  -     ketorolac injection 15 mg  -     X-Ray Foot 2 View Right; Future; Expected date: 03/27/2024  -     Sedimentation Rate; Future; Expected date: 03/27/2024  -     Uric Acid; Future; Expected date: 03/27/2024  -     CBC Auto  Differential; Future; Expected date: 03/27/2024    Other orders  -     indomethacin (INDOCIN) 25 MG capsule; Take 1 capsule (25 mg total) by mouth 3 (three) times daily.  Dispense: 9 capsule; Refill: 0  -     colchicine (COLCRYS) 0.6 mg tablet; Take 1 tablet (0.6 mg total) by mouth 2 (two) times daily.  Dispense: 9 tablet; Refill: 0  -     predniSONE (DELTASONE) 10 MG tablet; Take 1 tablet (10 mg total) by mouth 3 (three) times daily.  Dispense: 9 tablet; Refill: 0            Michael Weems MD

## 2024-04-03 NOTE — PROGRESS NOTES
Subjective:       Patient ID: Sami Kerns is a 74 y.o. male.    Chief Complaint: Follow-up (Follow up PVD )    HPI  .  Patient seen again today presented yesterday with severe gout attack evidence of chronic PVD voiding arterial Doppler studies I wanted to examine his right foot he did not want to take his shoe off stated he feels much much better    Current Medications:    Current Outpatient Medications:     amiodarone (PACERONE) 200 MG Tab, Take by mouth once daily., Disp: , Rfl:     ascorbic acid, vitamin C, (VITAMIN C) 1000 MG tablet, Take 1,000 mg by mouth once daily., Disp: , Rfl:     aspirin (ECOTRIN) 81 MG EC tablet, Take 81 mg by mouth once daily., Disp: , Rfl:     atorvastatin (LIPITOR) 80 MG tablet, Take 1 tablet by mouth once daily., Disp: , Rfl:     cholecalciferol, vitamin D3, (VITAMIN D3) 125 mcg (5,000 unit) Tab, Take 2 tablets (10,000 Units total) by mouth 3 (three) times a week. Start after taking Vitamin D 50,000 units is complete., Disp: 60 tablet, Rfl: 0    cilostazoL (PLETAL) 100 MG Tab, Take 100 mg by mouth 2 (two) times daily., Disp: , Rfl:     clopidogreL (PLAVIX) 75 mg tablet, Take 1 tablet (75 mg total) by mouth once daily., Disp: 90 tablet, Rfl: 1    colchicine (COLCRYS) 0.6 mg tablet, Take 1 tablet (0.6 mg total) by mouth 2 (two) times daily., Disp: 9 tablet, Rfl: 0    ergocalciferol (ERGOCALCIFEROL) 50,000 unit Cap, Take 1 capsule (50,000 Units total) by mouth every 7 days. Take for 6 weeks., Disp: 6 capsule, Rfl: 0    ezetimibe (ZETIA) 10 mg tablet, Take 10 mg by mouth once daily., Disp: , Rfl:     ferrous sulfate (FEOSOL) 325 mg (65 mg iron) Tab tablet, Take 1 tablet (325 mg total) by mouth 3 (three) times daily., Disp: 90 tablet, Rfl: 1    furosemide (LASIX) 40 MG tablet, Take 40 mg by mouth once daily., Disp: , Rfl:     hydrALAZINE (APRESOLINE) 50 MG tablet, Take 1 tablet (50 mg total) by mouth 3 (three) times daily., Disp: 90 tablet, Rfl: 1    HYDROcodone-acetaminophen (NORCO)  5-325 mg per tablet, , Disp: , Rfl:     HYDROcodone-acetaminophen (NORCO) 7.5-325 mg per tablet, Take 1 tablet by mouth daily as needed for Pain., Disp: 12 tablet, Rfl: 0    indomethacin (INDOCIN) 25 MG capsule, Take 1 capsule (25 mg total) by mouth 3 (three) times daily., Disp: 9 capsule, Rfl: 0    isosorbide mononitrate (IMDUR) 30 MG 24 hr tablet, Take 15 mg by mouth., Disp: , Rfl:     latanoprost 0.005 % ophthalmic solution, Place 1 drop into both eyes every evening., Disp: , Rfl:     losartan (COZAAR) 50 MG tablet, Take 2 tablets (100 mg total) by mouth once daily., Disp: 90 tablet, Rfl: 1    metoprolol succinate (TOPROL-XL) 50 MG 24 hr tablet, Take 1 tablet by mouth once daily., Disp: , Rfl:     naproxen (NAPROSYN) 500 MG tablet, TAKE ONE TABLET BY MOUTH TWICE DAILY AS NEEDED, Disp: 20 tablet, Rfl: 4    nitroGLYCERIN (NITROSTAT) 0.4 MG SL tablet, Place under the tongue., Disp: , Rfl:     predniSONE (DELTASONE) 10 MG tablet, Take 1 tablet (10 mg total) by mouth 3 (three) times daily., Disp: 9 tablet, Rfl: 0    tamsulosin (FLOMAX) 0.4 mg Cap, TAKE ONE CAPSULE BY MOUTH EVERY DAY, Disp: 90 capsule, Rfl: 3    timolol maleate 0.5% (TIMOPTIC) 0.5 % Drop, Place into both eyes., Disp: , Rfl:     valsartan (DIOVAN) 320 MG tablet, Take 320 mg by mouth., Disp: , Rfl:     vitamin E 100 UNIT capsule, Take 100 Units by mouth once daily., Disp: , Rfl:     albuterol (PROVENTIL/VENTOLIN HFA) 90 mcg/actuation inhaler, INHALE 2 PUFFS BY MOUTH EVERY 6 HOURS AS NEEDED FOR WHEEZING, Disp: 18 g, Rfl: 5    doxycycline (VIBRAMYCIN) 100 MG Cap, Take 1 capsule (100 mg total) by mouth 2 (two) times a day. (Patient not taking: Reported on 1/19/2024), Disp: 20 capsule, Rfl: 0  No current facility-administered medications for this visit.    Facility-Administered Medications Ordered in Other Visits:     0.9%  NaCl infusion, , Intravenous, Continuous, Jesse Smalls MD, Last Rate: 50 mL/hr at 08/15/23 0714, New Bag at 08/15/23 0714    "        ROS  Twelve point system reviewed, unremarkable except for stated above in HPI.        Objective:         Vitals:    03/28/24 1011 03/28/24 1012   BP: (!) 152/69 137/69   BP Location: Left forearm Right arm   Patient Position: Sitting Sitting   BP Method: X-Large (Automatic) X-Large (Automatic)   Pulse: 62    Resp: 18    Temp: 98.2 °F (36.8 °C)    TempSrc: Temporal    SpO2: 98%    Weight: 93.4 kg (206 lb)    Height: 5' 11" (1.803 m)         Physical Exam     Patient is awake alert oriented person place and  Lungs are clear to auscultation bilaterally no crackles or wheezes   Cardiovascular S1-S2 regular rate and rhythm no murmurs rubs or gallops   Abdomen is soft positive bowel sounds nontender, extremities no clubbing cyanosis edema  Neuro no focal neurological deficits  Skin warm and dry.     Last Labs:     Office Visit on 03/27/2024   Component Date Value    ESR Westergren 03/27/2024 69 (H)     Uric Acid 03/27/2024 8.9 (H)     WBC 03/27/2024 6.69     RBC 03/27/2024 4.53 (L)     Hemoglobin 03/27/2024 12.4 (L)     Hematocrit 03/27/2024 36.9 (L)     MCV 03/27/2024 81.5     MCH 03/27/2024 27.4     MCHC 03/27/2024 33.6     RDW 03/27/2024 14.9 (H)     Platelet Count 03/27/2024 239     MPV 03/27/2024 11.4     Neutrophils % 03/27/2024 58.5     Lymphocytes % 03/27/2024 26.6 (L)     Monocytes % 03/27/2024 8.8 (H)     Eosinophils % 03/27/2024 5.2 (H)     Basophils % 03/27/2024 0.6     Immature Granulocytes % 03/27/2024 0.3     nRBC, Auto 03/27/2024 0.0     Neutrophils, Abs 03/27/2024 3.91     Lymphocytes, Absolute 03/27/2024 1.78     Monocytes, Absolute 03/27/2024 0.59     Eosinophils, Absolute 03/27/2024 0.35     Basophils, Absolute 03/27/2024 0.04     Immature Granulocytes, A* 03/27/2024 0.02     nRBC, Absolute 03/27/2024 0.00     Diff Type 03/27/2024 Auto    Lab Visit on 03/11/2024   Component Date Value    Color, UA 03/11/2024 Light-Yellow     Clarity, UA 03/11/2024 Clear     pH, UA 03/11/2024 5.5     " Leukocytes, UA 03/11/2024 Negative     Nitrites, UA 03/11/2024 Negative     Protein, UA 03/11/2024 Negative     Glucose, UA 03/11/2024 Normal     Ketones, UA 03/11/2024 Negative     Urobilinogen, UA 03/11/2024 Normal     Bilirubin, UA 03/11/2024 Negative     Blood, UA 03/11/2024 Negative     Specific Gravity, UA 03/11/2024 1.013     Ferritin 03/11/2024 128     Iron 03/11/2024 42 (L)     Iron Saturation 03/11/2024 16     TIBC 03/11/2024 264     Creatinine, Urine 03/11/2024 125     Microalbumin 03/11/2024 7.7 (H)     Microalbumin/Creatinine * 03/11/2024 61.6 (H)     Creatinine, Urine 03/11/2024 126     Protein, Urine 03/11/2024 18.8 (H)     Protein/Creatinine Ratio 03/11/2024 0.149     PTH 03/11/2024 151.40 (H)     Sodium 03/11/2024 139     Potassium 03/11/2024 5.4 (H)     Chloride 03/11/2024 106     CO2 03/11/2024 25     Anion Gap 03/11/2024 13     Glucose 03/11/2024 124 (H)     BUN 03/11/2024 35 (H)     Creatinine 03/11/2024 2.68 (H)     BUN/Creatinine Ratio 03/11/2024 13     Calcium 03/11/2024 9.3     Albumin 03/11/2024 3.8     Phosphorus 03/11/2024 4.3     eGFR 03/11/2024 24 (L)     Vitamin D 25-Hydroxy, Bl* 03/11/2024 48.2     WBC 03/11/2024 6.22     RBC 03/11/2024 4.38 (L)     Hemoglobin 03/11/2024 12.1 (L)     Hematocrit 03/11/2024 35.6 (L)     MCV 03/11/2024 81.3     MCH 03/11/2024 27.6     MCHC 03/11/2024 34.0     RDW 03/11/2024 15.3 (H)     Platelet Count 03/11/2024 226     MPV 03/11/2024 11.6     Neutrophils % 03/11/2024 56.6     Lymphocytes % 03/11/2024 26.8 (L)     Monocytes % 03/11/2024 8.5 (H)     Eosinophils % 03/11/2024 7.1 (H)     Basophils % 03/11/2024 0.8     Immature Granulocytes % 03/11/2024 0.2     nRBC, Auto 03/11/2024 0.0     Neutrophils, Abs 03/11/2024 3.52     Lymphocytes, Absolute 03/11/2024 1.67     Monocytes, Absolute 03/11/2024 0.53     Eosinophils, Absolute 03/11/2024 0.44     Basophils, Absolute 03/11/2024 0.05     Immature Granulocytes, A* 03/11/2024 0.01     nRBC, Absolute 03/11/2024  0.00     Diff Type 03/11/2024 Auto        Last Imaging:  US Lower Extremity Veins Right  Narrative: EXAMINATION:  US LOWER EXTREMITY VEINS RIGHT    CLINICAL HISTORY:  Peripheral vascular disease, unspecified    COMPARISON:  None.    TECHNIQUE:  Grayscale, spectral, and color Doppler interrogation of the right lower extremity veins was performed. Augmentation and compression was performed.    FINDINGS:  Grayscale, color Doppler, and pulsed Doppler evaluation of the veins of the right lower extremity demonstrate no evidence of deep venous thrombosis.  Impression: No evidence of deep venous thrombosis in the right lower extremity.    Point of Service: [facility]    Electronically signed by: Sheldon Verma  Date:    03/27/2024  Time:    13:41  X-Ray Foot 2 View Right  Narrative: EXAMINATION:  XR FOOT 2 VIEW RIGHT    CLINICAL HISTORY:  Gout, unspecified    TECHNIQUE:  AP and lateral views of the right foot    COMPARISON:  None    FINDINGS:  No fracture detected.  No dislocation.  There are mild degenerative change seen throughout the foot and most notably of the great toe metatarsophalangeal joint.  No erosion or soft tissue prominence.  Impression: As above.    Electronically signed by: Bang Schilling  Date:    03/27/2024  Time:    10:38         **Labs and x-rays personally reviewed by me    ** reviewed           Assessment & Plan:       1. PVD (peripheral vascular disease)    2. Gout, unspecified cause, unspecified chronicity, unspecified site    3. History of tobacco use    Due to the many adverse health risks of smoking tobacco,  Patient has been encouraged to quit smoking, and smoking sensation treatments have been   treatments have been offered and a  smoking cessation class has been recommended to the patient        Michael Weems MD

## 2024-04-08 ENCOUNTER — OFFICE VISIT (OUTPATIENT)
Dept: FAMILY MEDICINE | Facility: CLINIC | Age: 74
End: 2024-04-08
Payer: MEDICARE

## 2024-04-08 VITALS
BODY MASS INDEX: 28.88 KG/M2 | SYSTOLIC BLOOD PRESSURE: 126 MMHG | TEMPERATURE: 98 F | OXYGEN SATURATION: 97 % | RESPIRATION RATE: 16 BRPM | WEIGHT: 195 LBS | HEART RATE: 65 BPM | HEIGHT: 69 IN | DIASTOLIC BLOOD PRESSURE: 64 MMHG

## 2024-04-08 DIAGNOSIS — Z23 NEED FOR VACCINATION: ICD-10-CM

## 2024-04-08 DIAGNOSIS — E78.5 DYSLIPIDEMIA: ICD-10-CM

## 2024-04-08 DIAGNOSIS — I73.9 PVD (PERIPHERAL VASCULAR DISEASE): ICD-10-CM

## 2024-04-08 DIAGNOSIS — Z00.00 ENCOUNTER FOR SUBSEQUENT ANNUAL WELLNESS VISIT (AWV) IN MEDICARE PATIENT: Primary | ICD-10-CM

## 2024-04-08 DIAGNOSIS — N18.31 STAGE 3A CHRONIC KIDNEY DISEASE: ICD-10-CM

## 2024-04-08 DIAGNOSIS — E55.9 VITAMIN D DEFICIENCY, UNSPECIFIED: ICD-10-CM

## 2024-04-08 DIAGNOSIS — I10 ESSENTIAL (PRIMARY) HYPERTENSION: ICD-10-CM

## 2024-04-08 DIAGNOSIS — Z12.5 SCREENING FOR PROSTATE CANCER: ICD-10-CM

## 2024-04-08 LAB
CHOLEST SERPL-MCNC: 97 MG/DL (ref 0–200)
CHOLEST/HDLC SERPL: 2.4 {RATIO}
HDLC SERPL-MCNC: 40 MG/DL (ref 40–60)
LDLC SERPL CALC-MCNC: 37 MG/DL
NONHDLC SERPL-MCNC: 57 MG/DL
PSA SERPL-MCNC: 2.77 NG/ML
TRIGL SERPL-MCNC: 101 MG/DL (ref 35–150)
VLDLC SERPL-MCNC: 20 MG/DL

## 2024-04-08 PROCEDURE — 3288F FALL RISK ASSESSMENT DOCD: CPT | Mod: ,,, | Performed by: NURSE PRACTITIONER

## 2024-04-08 PROCEDURE — 3074F SYST BP LT 130 MM HG: CPT | Mod: ,,, | Performed by: NURSE PRACTITIONER

## 2024-04-08 PROCEDURE — G0439 PPPS, SUBSEQ VISIT: HCPCS | Mod: ,,, | Performed by: NURSE PRACTITIONER

## 2024-04-08 PROCEDURE — 80061 LIPID PANEL: CPT | Mod: ,,, | Performed by: CLINICAL MEDICAL LABORATORY

## 2024-04-08 PROCEDURE — 3066F NEPHROPATHY DOC TX: CPT | Mod: ,,, | Performed by: NURSE PRACTITIONER

## 2024-04-08 PROCEDURE — 1170F FXNL STATUS ASSESSED: CPT | Mod: ,,, | Performed by: NURSE PRACTITIONER

## 2024-04-08 PROCEDURE — 1126F AMNT PAIN NOTED NONE PRSNT: CPT | Mod: ,,, | Performed by: NURSE PRACTITIONER

## 2024-04-08 PROCEDURE — 1159F MED LIST DOCD IN RCRD: CPT | Mod: ,,, | Performed by: NURSE PRACTITIONER

## 2024-04-08 PROCEDURE — G0103 PSA SCREENING: HCPCS | Mod: ,,, | Performed by: CLINICAL MEDICAL LABORATORY

## 2024-04-08 PROCEDURE — 1101F PT FALLS ASSESS-DOCD LE1/YR: CPT | Mod: ,,, | Performed by: NURSE PRACTITIONER

## 2024-04-08 PROCEDURE — 3078F DIAST BP <80 MM HG: CPT | Mod: ,,, | Performed by: NURSE PRACTITIONER

## 2024-04-08 PROCEDURE — 1158F ADVNC CARE PLAN TLK DOCD: CPT | Mod: ,,, | Performed by: NURSE PRACTITIONER

## 2024-04-08 PROCEDURE — 1160F RVW MEDS BY RX/DR IN RCRD: CPT | Mod: ,,, | Performed by: NURSE PRACTITIONER

## 2024-04-08 PROCEDURE — 4010F ACE/ARB THERAPY RXD/TAKEN: CPT | Mod: ,,, | Performed by: NURSE PRACTITIONER

## 2024-04-08 PROCEDURE — 3060F POS MICROALBUMINURIA REV: CPT | Mod: ,,, | Performed by: NURSE PRACTITIONER

## 2024-04-08 RX ORDER — ZOSTER VACCINE RECOMBINANT, ADJUVANTED 50 MCG/0.5
0.5 KIT INTRAMUSCULAR ONCE
Qty: 1 EACH | Refills: 0 | Status: SHIPPED | OUTPATIENT
Start: 2024-04-08 | End: 2024-04-08

## 2024-04-08 NOTE — LETTER
AUTHORIZATION FOR RELEASE OF   CONFIDENTIAL INFORMATION    Dear [facility],    We are seeing Sami Kerns, date of birth 1950, in the clinic at [facility]. Michael Weems MD is the patient's PCP. Sami Kerns has an outstanding lab/procedure at the time we reviewed his chart. In order to help keep his health information updated, he has authorized us to request the following medical record(s):        (  )  MAMMOGRAM                                      (  )  COLONOSCOPY      (  )  PAP SMEAR                                          (  )  OUTSIDE LAB RESULTS     (  )  DEXA SCAN                                          (  )  EYE EXAM            (  )  FOOT EXAM                                          (  )  ENTIRE RECORD     ( x )  OUTSIDE IMMUNIZATIONS                 (  )  _______________         Please fax records to [facility], [place], John Anthony, MD, [phone]     If you have any questions, please contact  at [phone].           Patient Name: Sami Kerns  : 1950  Patient Phone #: [phone]

## 2024-04-08 NOTE — PATIENT INSTRUCTIONS
Counseling and Referral of Other Preventative  (Italic type indicates deductible and co-insurance are waived)    Patient Name: Sami Kerns  Today's Date: 4/8/2024    Health Maintenance       Date Due Completion Date    RSV Vaccine (Age 60+ and Pregnant patients) (1 - 1-dose 60+ series) Never done ---    LDCT Lung Screen 07/02/2021 7/2/2020    COVID-19 Vaccine (5 - 2023-24 season) 09/01/2023 7/8/2022    Lipid Panel 04/03/2024 4/3/2023    Influenza Vaccine (1) 06/30/2024 (Originally 9/1/2023) 10/4/2021    TETANUS VACCINE 10/09/2024 (Originally 1/19/1969) ---    Shingles Vaccine (1 of 2) 10/09/2024 (Originally 1/19/2001) ---    Colorectal Cancer Screening 11/29/2024 11/29/2021    Annual UACr 03/11/2025 3/11/2024    High Dose Statin 03/28/2025 3/28/2024    Aspirin/Antiplatelet Therapy 03/28/2025 3/28/2024        No orders of the defined types were placed in this encounter.      The following information is provided to all patients.  This information is to help you find resources for any of the problems found today that may be affecting your health:                  Living healthy guide: ms.gov    Understanding Diabetes: www.diabetes.org      Eating healthy: www.cdc.gov/healthyweight      CDC home safety checklist: www.cdc.gov/steadi/patient.html      Agency on Aging: ms.gov    Alcoholics anonymous (AA): www.aa.org      Physical Activity: www.edouard.nih.gov/at8sqkx      Tobacco use: ms.gov

## 2024-04-24 RX ORDER — INDOMETHACIN 25 MG/1
25 CAPSULE ORAL 3 TIMES DAILY
Qty: 9 CAPSULE | Refills: 0 | Status: SHIPPED | OUTPATIENT
Start: 2024-04-24 | End: 2024-04-29 | Stop reason: SDUPTHER

## 2024-04-25 ENCOUNTER — OFFICE VISIT (OUTPATIENT)
Dept: OTOLARYNGOLOGY | Facility: CLINIC | Age: 74
End: 2024-04-25
Payer: MEDICARE

## 2024-04-25 VITALS — HEIGHT: 69 IN | BODY MASS INDEX: 28.88 KG/M2 | WEIGHT: 195 LBS

## 2024-04-25 DIAGNOSIS — J38.1 VOCAL CORD POLYPS: Primary | ICD-10-CM

## 2024-04-25 PROCEDURE — 1160F RVW MEDS BY RX/DR IN RCRD: CPT | Mod: CPTII,,, | Performed by: OTOLARYNGOLOGY

## 2024-04-25 PROCEDURE — 99213 OFFICE O/P EST LOW 20 MIN: CPT | Mod: PBBFAC | Performed by: OTOLARYNGOLOGY

## 2024-04-25 PROCEDURE — 3066F NEPHROPATHY DOC TX: CPT | Mod: CPTII,,, | Performed by: OTOLARYNGOLOGY

## 2024-04-25 PROCEDURE — 3008F BODY MASS INDEX DOCD: CPT | Mod: CPTII,,, | Performed by: OTOLARYNGOLOGY

## 2024-04-25 PROCEDURE — 1159F MED LIST DOCD IN RCRD: CPT | Mod: CPTII,,, | Performed by: OTOLARYNGOLOGY

## 2024-04-25 PROCEDURE — 99214 OFFICE O/P EST MOD 30 MIN: CPT | Mod: S$PBB,,, | Performed by: OTOLARYNGOLOGY

## 2024-04-25 PROCEDURE — 3060F POS MICROALBUMINURIA REV: CPT | Mod: CPTII,,, | Performed by: OTOLARYNGOLOGY

## 2024-04-25 PROCEDURE — 4010F ACE/ARB THERAPY RXD/TAKEN: CPT | Mod: CPTII,,, | Performed by: OTOLARYNGOLOGY

## 2024-04-25 NOTE — PROGRESS NOTES
Subjective:       Patient ID: Sami Kerns is a 74 y.o. male.    Chief Complaint: Follow-up (6 month vocal cord evaluation.)    Follow-up      Review of Systems   HENT:  Negative for trouble swallowing and voice change.    All other systems reviewed and are negative.      Objective:      Physical Exam  General: NAD voice back to normal   Head: Normocephalic, atraumatic, no facial asymmetry/normal strength,  Ears: Both auricules normal in appearance, w/o deformities tympanic membranes normal external auditory canals normal  Nose: External nose w/o deformities normal turbinates no drainage or inflammation  Oral Cavity: Lips, gums, floor of mouth, tongue hard palate, and buccal mucosa without mass/lesion  Oropharynx: Mucosa pink and moist, soft palate, posterior pharynx and oropharyngeal wall without mass/lesion  Neck: Supple, symmetric, trachea midline, no palpable mass/lesion, no palpable cervical lymphadenopathy  Skin: Warm and dry, no concerning lesions  Respiratory: Respirations even, unlabored  Assessment:       1. Vocal cord polyps        Plan:       F/u 6 months   Fol then if gets worse

## 2024-04-26 ENCOUNTER — HOSPITAL ENCOUNTER (OUTPATIENT)
Dept: RADIOLOGY | Facility: HOSPITAL | Age: 74
Discharge: HOME | End: 2024-04-26
Attending: INTERNAL MEDICINE
Payer: MEDICARE

## 2024-04-26 VITALS — BODY MASS INDEX: 28.92 KG/M2 | HEIGHT: 70 IN | WEIGHT: 202 LBS

## 2024-04-26 DIAGNOSIS — Z87.891 HISTORY OF TOBACCO USE: ICD-10-CM

## 2024-04-26 PROCEDURE — 71271 CT THORAX LUNG CANCER SCR C-: CPT | Mod: 26,,, | Performed by: RADIOLOGY

## 2024-04-26 PROCEDURE — 71271 CT THORAX LUNG CANCER SCR C-: CPT | Mod: TC

## 2024-04-29 ENCOUNTER — OFFICE VISIT (OUTPATIENT)
Dept: FAMILY MEDICINE | Facility: CLINIC | Age: 74
End: 2024-04-29
Payer: MEDICARE

## 2024-04-29 VITALS
HEIGHT: 70 IN | DIASTOLIC BLOOD PRESSURE: 70 MMHG | HEART RATE: 67 BPM | BODY MASS INDEX: 28.06 KG/M2 | WEIGHT: 196 LBS | SYSTOLIC BLOOD PRESSURE: 156 MMHG | OXYGEN SATURATION: 97 % | RESPIRATION RATE: 18 BRPM | TEMPERATURE: 97 F

## 2024-04-29 DIAGNOSIS — M10.9 GOUT, UNSPECIFIED CAUSE, UNSPECIFIED CHRONICITY, UNSPECIFIED SITE: ICD-10-CM

## 2024-04-29 DIAGNOSIS — I10 ESSENTIAL (PRIMARY) HYPERTENSION: Primary | ICD-10-CM

## 2024-04-29 PROCEDURE — 4010F ACE/ARB THERAPY RXD/TAKEN: CPT | Mod: ,,, | Performed by: INTERNAL MEDICINE

## 2024-04-29 PROCEDURE — 1159F MED LIST DOCD IN RCRD: CPT | Mod: ,,, | Performed by: INTERNAL MEDICINE

## 2024-04-29 PROCEDURE — 99214 OFFICE O/P EST MOD 30 MIN: CPT | Mod: ,,, | Performed by: INTERNAL MEDICINE

## 2024-04-29 PROCEDURE — 3077F SYST BP >= 140 MM HG: CPT | Mod: ,,, | Performed by: INTERNAL MEDICINE

## 2024-04-29 PROCEDURE — 3078F DIAST BP <80 MM HG: CPT | Mod: ,,, | Performed by: INTERNAL MEDICINE

## 2024-04-29 PROCEDURE — 1126F AMNT PAIN NOTED NONE PRSNT: CPT | Mod: ,,, | Performed by: INTERNAL MEDICINE

## 2024-04-29 PROCEDURE — 3066F NEPHROPATHY DOC TX: CPT | Mod: ,,, | Performed by: INTERNAL MEDICINE

## 2024-04-29 PROCEDURE — 3060F POS MICROALBUMINURIA REV: CPT | Mod: ,,, | Performed by: INTERNAL MEDICINE

## 2024-04-29 PROCEDURE — 3288F FALL RISK ASSESSMENT DOCD: CPT | Mod: ,,, | Performed by: INTERNAL MEDICINE

## 2024-04-29 PROCEDURE — 3008F BODY MASS INDEX DOCD: CPT | Mod: ,,, | Performed by: INTERNAL MEDICINE

## 2024-04-29 PROCEDURE — 1101F PT FALLS ASSESS-DOCD LE1/YR: CPT | Mod: ,,, | Performed by: INTERNAL MEDICINE

## 2024-04-29 RX ORDER — INDOMETHACIN 25 MG/1
25 CAPSULE ORAL 2 TIMES DAILY PRN
Qty: 9 CAPSULE | Refills: 3 | Status: SHIPPED | OUTPATIENT
Start: 2024-04-29

## 2024-04-29 RX ORDER — ALLOPURINOL 100 MG/1
100 TABLET ORAL DAILY
Qty: 90 TABLET | Refills: 1 | Status: SHIPPED | OUTPATIENT
Start: 2024-04-29

## 2024-04-30 ENCOUNTER — EXTERNAL CHRONIC CARE MANAGEMENT (OUTPATIENT)
Dept: FAMILY MEDICINE | Facility: CLINIC | Age: 74
End: 2024-04-30
Payer: MEDICARE

## 2024-04-30 PROCEDURE — G0511 CCM/BHI BY RHC/FQHC 20MIN MO: HCPCS | Mod: ,,, | Performed by: INTERNAL MEDICINE

## 2024-05-01 NOTE — PROGRESS NOTES
Subjective:       Patient ID: Sami Kerns is a 74 y.o. male.    Chief Complaint: Follow-up (1 month fu PVD)    HPI  .  Patient complains of moderate pain in his feet he does have a history of gout he stated he drank moderate alcohol this past weekend watching TV.  He also has chronic hypertension.  He is having gout exacerbation plan will be in his 25 mg b.i.d. p.r.n. pain and wants the gout flare subsides then patient told his start allopurinol 101 p.o. q.day.    Current Medications:    Current Outpatient Medications:     albuterol (PROVENTIL/VENTOLIN HFA) 90 mcg/actuation inhaler, INHALE 2 PUFFS BY MOUTH EVERY 6 HOURS AS NEEDED FOR WHEEZING, Disp: 18 g, Rfl: 5    amiodarone (PACERONE) 200 MG Tab, Take by mouth once daily., Disp: , Rfl:     ascorbic acid, vitamin C, (VITAMIN C) 1000 MG tablet, Take 1,000 mg by mouth once daily., Disp: , Rfl:     aspirin (ECOTRIN) 81 MG EC tablet, Take 81 mg by mouth once daily., Disp: , Rfl:     atorvastatin (LIPITOR) 80 MG tablet, Take 1 tablet by mouth once daily., Disp: , Rfl:     cholecalciferol, vitamin D3, (VITAMIN D3) 125 mcg (5,000 unit) Tab, Take 2 tablets (10,000 Units total) by mouth 3 (three) times a week. Start after taking Vitamin D 50,000 units is complete., Disp: 60 tablet, Rfl: 0    cilostazoL (PLETAL) 100 MG Tab, Take 100 mg by mouth 2 (two) times daily., Disp: , Rfl:     clopidogreL (PLAVIX) 75 mg tablet, Take 1 tablet (75 mg total) by mouth once daily., Disp: 90 tablet, Rfl: 1    ezetimibe (ZETIA) 10 mg tablet, Take 10 mg by mouth once daily., Disp: , Rfl:     ferrous sulfate (FEOSOL) 325 mg (65 mg iron) Tab tablet, Take 1 tablet (325 mg total) by mouth 3 (three) times daily., Disp: 90 tablet, Rfl: 1    furosemide (LASIX) 40 MG tablet, Take 40 mg by mouth once daily., Disp: , Rfl:     isosorbide mononitrate (IMDUR) 30 MG 24 hr tablet, Take 15 mg by mouth., Disp: , Rfl:     latanoprost 0.005 % ophthalmic solution, Place 1 drop into both eyes every evening.,  Disp: , Rfl:     losartan (COZAAR) 50 MG tablet, Take 2 tablets (100 mg total) by mouth once daily., Disp: 90 tablet, Rfl: 1    metoprolol succinate (TOPROL-XL) 50 MG 24 hr tablet, Take 1 tablet by mouth once daily., Disp: , Rfl:     naproxen (NAPROSYN) 500 MG tablet, TAKE ONE TABLET BY MOUTH TWICE DAILY AS NEEDED, Disp: 20 tablet, Rfl: 4    nitroGLYCERIN (NITROSTAT) 0.4 MG SL tablet, Place under the tongue., Disp: , Rfl:     predniSONE (DELTASONE) 10 MG tablet, Take 1 tablet (10 mg total) by mouth 3 (three) times daily., Disp: 9 tablet, Rfl: 0    tamsulosin (FLOMAX) 0.4 mg Cap, TAKE ONE CAPSULE BY MOUTH EVERY DAY, Disp: 90 capsule, Rfl: 3    timolol maleate 0.5% (TIMOPTIC) 0.5 % Drop, Place into both eyes., Disp: , Rfl:     valsartan (DIOVAN) 320 MG tablet, Take 320 mg by mouth., Disp: , Rfl:     vitamin E 100 UNIT capsule, Take 100 Units by mouth once daily., Disp: , Rfl:     allopurinoL (ZYLOPRIM) 100 MG tablet, Take 1 tablet (100 mg total) by mouth once daily., Disp: 90 tablet, Rfl: 1    colchicine (COLCRYS) 0.6 mg tablet, Take 1 tablet (0.6 mg total) by mouth 2 (two) times daily. (Patient not taking: Reported on 4/8/2024), Disp: 9 tablet, Rfl: 0    doxycycline (VIBRAMYCIN) 100 MG Cap, Take 1 capsule (100 mg total) by mouth 2 (two) times a day. (Patient not taking: Reported on 1/19/2024), Disp: 20 capsule, Rfl: 0    ergocalciferol (ERGOCALCIFEROL) 50,000 unit Cap, Take 1 capsule (50,000 Units total) by mouth every 7 days. Take for 6 weeks. (Patient not taking: Reported on 4/8/2024), Disp: 6 capsule, Rfl: 0    hydrALAZINE (APRESOLINE) 50 MG tablet, Take 1 tablet (50 mg total) by mouth 3 (three) times daily. (Patient not taking: Reported on 4/8/2024), Disp: 90 tablet, Rfl: 1    HYDROcodone-acetaminophen (NORCO) 5-325 mg per tablet, , Disp: , Rfl:     HYDROcodone-acetaminophen (NORCO) 7.5-325 mg per tablet, Take 1 tablet by mouth daily as needed for Pain. (Patient not taking: Reported on 4/8/2024), Disp: 12 tablet,  "Rfl: 0    indomethacin (INDOCIN) 25 MG capsule, Take 1 capsule (25 mg total) by mouth 2 (two) times daily as needed (for pain)., Disp: 9 capsule, Rfl: 3  No current facility-administered medications for this visit.    Facility-Administered Medications Ordered in Other Visits:     0.9%  NaCl infusion, , Intravenous, Continuous, Jesse Smalls MD, Last Rate: 50 mL/hr at 08/15/23 0714, New Bag at 08/15/23 0714           ROS  Twelve point system reviewed, unremarkable except for stated above in HPI.        Objective:         Vitals:    04/29/24 1037 04/29/24 1130   BP: (!) 151/65 (!) 156/70   BP Location: Left arm    Patient Position: Sitting    BP Method: Large (Automatic)    Pulse: 67    Resp: 18    Temp: 97.2 °F (36.2 °C)    TempSrc: Temporal    SpO2: 97%    Weight: 88.9 kg (196 lb)    Height: 5' 10" (1.778 m)         Physical Exam     Patient is awake alert oriented person place and  Lungs are clear to auscultation bilaterally no crackles or wheezes   Cardiovascular S1-S2 regular rate and rhythm no murmurs rubs or gallops   Abdomen is soft positive bowel sounds nontender, extremities no clubbing cyanosis edema  Neuro no focal neurological deficits  Skin warm and dry.     Last Labs:     Office Visit on 04/08/2024   Component Date Value    Triglycerides 04/08/2024 101     Cholesterol 04/08/2024 97     HDL Cholesterol 04/08/2024 40     Cholesterol/HDL Ratio (R* 04/08/2024 2.4     Non-HDL 04/08/2024 57     LDL Calculated 04/08/2024 37     VLDL 04/08/2024 20     PSA Total 04/08/2024 2.770        Last Imaging:  CT Chest Lung Screening Low Dose  Narrative: EXAMINATION:  CT CHEST LUNG SCREENING LOW DOSE    CLINICAL HISTORY:  Lung cancer screening, >= 30 pk-yr current smoker (Age 55-80y); Personal history of nicotine dependence    TECHNIQUE:  CT of the thorax was performed with low dose, lung screening protocol.  No contrast was administered.  Sagittal and coronal reconstructions were obtained.    The CT examination was " performed using one or more of the following dose reduction techniques: Automated exposure control, adjustment of the mA and kV according to patient's size, use of acute or iterative reconstruction techniques.    COMPARISON:  7/2/2020    FINDINGS:  Lungs: There are no abnormal opacities that require further evaluation.  The lungs show diffuse similar findings consistent with emphysema.    Pleura:   No effusion.    Heart and pericardium: Prior CABG    Aorta and vasculature: Atherosclerosis including coronary arteries.    Chest wall and skeletal structures: Unremarkable except age-appropriate degenerative changes.    Upper abdomen: Unremarkable.  Impression: Lung-RADS Category:  1 - Negative - Continue annual screening with LDCT in 12 months.    Clinically or potentially clinically significant non lung cancer finding:  None.    Prior Lung Cancer Modifier:  No history of prior lung cancer.    Electronically signed by: Bang Schilling  Date:    04/26/2024  Time:    09:29         **Labs and x-rays personally reviewed by me    ** reviewed           Assessment & Plan:       1. Essential (primary) hypertension    2. Gout, unspecified cause, unspecified chronicity, unspecified site    Other orders  -     indomethacin (INDOCIN) 25 MG capsule; Take 1 capsule (25 mg total) by mouth 2 (two) times daily as needed (for pain).  Dispense: 9 capsule; Refill: 3  -     allopurinoL (ZYLOPRIM) 100 MG tablet; Take 1 tablet (100 mg total) by mouth once daily.  Dispense: 90 tablet; Refill: 1            Michael Weems MD

## 2024-05-13 ENCOUNTER — OFFICE VISIT (OUTPATIENT)
Dept: FAMILY MEDICINE | Facility: CLINIC | Age: 74
End: 2024-05-13
Payer: MEDICARE

## 2024-05-13 VITALS
BODY MASS INDEX: 27.92 KG/M2 | HEART RATE: 62 BPM | HEIGHT: 70 IN | WEIGHT: 195 LBS | OXYGEN SATURATION: 97 % | TEMPERATURE: 98 F | RESPIRATION RATE: 18 BRPM | DIASTOLIC BLOOD PRESSURE: 61 MMHG | SYSTOLIC BLOOD PRESSURE: 122 MMHG

## 2024-05-13 DIAGNOSIS — R80.9 PROTEINURIA, UNSPECIFIED TYPE: Primary | ICD-10-CM

## 2024-05-13 DIAGNOSIS — M10.9 GOUT, UNSPECIFIED CAUSE, UNSPECIFIED CHRONICITY, UNSPECIFIED SITE: ICD-10-CM

## 2024-05-13 PROCEDURE — 4010F ACE/ARB THERAPY RXD/TAKEN: CPT | Mod: ,,, | Performed by: INTERNAL MEDICINE

## 2024-05-13 PROCEDURE — 3288F FALL RISK ASSESSMENT DOCD: CPT | Mod: ,,, | Performed by: INTERNAL MEDICINE

## 2024-05-13 PROCEDURE — 3008F BODY MASS INDEX DOCD: CPT | Mod: ,,, | Performed by: INTERNAL MEDICINE

## 2024-05-13 PROCEDURE — 3066F NEPHROPATHY DOC TX: CPT | Mod: ,,, | Performed by: INTERNAL MEDICINE

## 2024-05-13 PROCEDURE — 1101F PT FALLS ASSESS-DOCD LE1/YR: CPT | Mod: ,,, | Performed by: INTERNAL MEDICINE

## 2024-05-13 PROCEDURE — 3060F POS MICROALBUMINURIA REV: CPT | Mod: ,,, | Performed by: INTERNAL MEDICINE

## 2024-05-13 PROCEDURE — 3074F SYST BP LT 130 MM HG: CPT | Mod: ,,, | Performed by: INTERNAL MEDICINE

## 2024-05-13 PROCEDURE — 3078F DIAST BP <80 MM HG: CPT | Mod: ,,, | Performed by: INTERNAL MEDICINE

## 2024-05-13 PROCEDURE — 99213 OFFICE O/P EST LOW 20 MIN: CPT | Mod: ,,, | Performed by: INTERNAL MEDICINE

## 2024-05-13 PROCEDURE — 1126F AMNT PAIN NOTED NONE PRSNT: CPT | Mod: ,,, | Performed by: INTERNAL MEDICINE

## 2024-05-13 PROCEDURE — 1159F MED LIST DOCD IN RCRD: CPT | Mod: ,,, | Performed by: INTERNAL MEDICINE

## 2024-05-14 PROBLEM — R80.9 PROTEINURIA: Status: ACTIVE | Noted: 2024-05-14

## 2024-05-14 NOTE — PROGRESS NOTES
Subjective:       Patient ID: Sami Kerns is a 74 y.o. male.    Chief Complaint: Hypertension    HPI  .  Patient is follow-up due to history of gout gout is much improved however he does have evidence of proteinuria.  Patient referred to Nephrology and told to keep close contact with Nephrology.  Current Medications:    Current Outpatient Medications:     albuterol (PROVENTIL/VENTOLIN HFA) 90 mcg/actuation inhaler, INHALE 2 PUFFS BY MOUTH EVERY 6 HOURS AS NEEDED FOR WHEEZING, Disp: 18 g, Rfl: 5    allopurinoL (ZYLOPRIM) 100 MG tablet, Take 1 tablet (100 mg total) by mouth once daily., Disp: 90 tablet, Rfl: 1    amiodarone (PACERONE) 200 MG Tab, Take by mouth once daily., Disp: , Rfl:     ascorbic acid, vitamin C, (VITAMIN C) 1000 MG tablet, Take 1,000 mg by mouth once daily., Disp: , Rfl:     aspirin (ECOTRIN) 81 MG EC tablet, Take 81 mg by mouth once daily., Disp: , Rfl:     atorvastatin (LIPITOR) 80 MG tablet, Take 1 tablet by mouth once daily., Disp: , Rfl:     cholecalciferol, vitamin D3, (VITAMIN D3) 125 mcg (5,000 unit) Tab, Take 2 tablets (10,000 Units total) by mouth 3 (three) times a week. Start after taking Vitamin D 50,000 units is complete., Disp: 60 tablet, Rfl: 0    cilostazoL (PLETAL) 100 MG Tab, Take 100 mg by mouth 2 (two) times daily., Disp: , Rfl:     clopidogreL (PLAVIX) 75 mg tablet, Take 1 tablet (75 mg total) by mouth once daily., Disp: 90 tablet, Rfl: 1    colchicine (COLCRYS) 0.6 mg tablet, Take 1 tablet (0.6 mg total) by mouth 2 (two) times daily., Disp: 9 tablet, Rfl: 0    doxycycline (VIBRAMYCIN) 100 MG Cap, Take 1 capsule (100 mg total) by mouth 2 (two) times a day., Disp: 20 capsule, Rfl: 0    ergocalciferol (ERGOCALCIFEROL) 50,000 unit Cap, Take 1 capsule (50,000 Units total) by mouth every 7 days. Take for 6 weeks., Disp: 6 capsule, Rfl: 0    ezetimibe (ZETIA) 10 mg tablet, Take 10 mg by mouth once daily., Disp: , Rfl:     ferrous sulfate (FEOSOL) 325 mg (65 mg iron) Tab tablet,  Take 1 tablet (325 mg total) by mouth 3 (three) times daily., Disp: 90 tablet, Rfl: 1    furosemide (LASIX) 40 MG tablet, Take 40 mg by mouth once daily., Disp: , Rfl:     hydrALAZINE (APRESOLINE) 50 MG tablet, Take 1 tablet (50 mg total) by mouth 3 (three) times daily., Disp: 90 tablet, Rfl: 1    HYDROcodone-acetaminophen (NORCO) 5-325 mg per tablet, , Disp: , Rfl:     HYDROcodone-acetaminophen (NORCO) 7.5-325 mg per tablet, Take 1 tablet by mouth daily as needed for Pain., Disp: 12 tablet, Rfl: 0    indomethacin (INDOCIN) 25 MG capsule, Take 1 capsule (25 mg total) by mouth 2 (two) times daily as needed (for pain)., Disp: 9 capsule, Rfl: 3    isosorbide mononitrate (IMDUR) 30 MG 24 hr tablet, Take 15 mg by mouth., Disp: , Rfl:     latanoprost 0.005 % ophthalmic solution, Place 1 drop into both eyes every evening., Disp: , Rfl:     losartan (COZAAR) 50 MG tablet, Take 2 tablets (100 mg total) by mouth once daily., Disp: 90 tablet, Rfl: 1    metoprolol succinate (TOPROL-XL) 50 MG 24 hr tablet, Take 1 tablet by mouth once daily., Disp: , Rfl:     naproxen (NAPROSYN) 500 MG tablet, TAKE ONE TABLET BY MOUTH TWICE DAILY AS NEEDED, Disp: 20 tablet, Rfl: 4    nitroGLYCERIN (NITROSTAT) 0.4 MG SL tablet, Place under the tongue., Disp: , Rfl:     predniSONE (DELTASONE) 10 MG tablet, Take 1 tablet (10 mg total) by mouth 3 (three) times daily., Disp: 9 tablet, Rfl: 0    tamsulosin (FLOMAX) 0.4 mg Cap, TAKE ONE CAPSULE BY MOUTH EVERY DAY, Disp: 90 capsule, Rfl: 3    timolol maleate 0.5% (TIMOPTIC) 0.5 % Drop, Place into both eyes., Disp: , Rfl:     valsartan (DIOVAN) 320 MG tablet, Take 320 mg by mouth., Disp: , Rfl:     vitamin E 100 UNIT capsule, Take 100 Units by mouth once daily., Disp: , Rfl:   No current facility-administered medications for this visit.    Facility-Administered Medications Ordered in Other Visits:     0.9%  NaCl infusion, , Intravenous, Continuous, Jesse Smalls MD, Last Rate: 50 mL/hr at 08/15/23  "0714, New Bag at 08/15/23 0714           ROS  Twelve point system reviewed, unremarkable except for stated above in HPI.        Objective:         Vitals:    05/13/24 0933   BP: 122/61   BP Location: Left arm   Patient Position: Sitting   BP Method: Large (Automatic)   Pulse: 62   Resp: 18   Temp: 97.5 °F (36.4 °C)   TempSrc: Temporal   SpO2: 97%   Weight: 88.5 kg (195 lb)   Height: 5' 10" (1.778 m)        Physical Exam     Patient is awake alert oriented person place and  Lungs are clear to auscultation bilaterally no crackles or wheezes   Cardiovascular S1-S2 regular rate and rhythm no murmurs rubs or gallops   Abdomen is soft positive bowel sounds nontender, extremities no clubbing cyanosis edema  Neuro no focal neurological deficits  Skin warm and dry.     Last Labs:     No visits with results within 1 Month(s) from this visit.   Latest known visit with results is:   Office Visit on 04/08/2024   Component Date Value    Triglycerides 04/08/2024 101     Cholesterol 04/08/2024 97     HDL Cholesterol 04/08/2024 40     Cholesterol/HDL Ratio (R* 04/08/2024 2.4     Non-HDL 04/08/2024 57     LDL Calculated 04/08/2024 37     VLDL 04/08/2024 20     PSA Total 04/08/2024 2.770        Last Imaging:  CT Chest Lung Screening Low Dose  Narrative: EXAMINATION:  CT CHEST LUNG SCREENING LOW DOSE    CLINICAL HISTORY:  Lung cancer screening, >= 30 pk-yr current smoker (Age 55-80y); Personal history of nicotine dependence    TECHNIQUE:  CT of the thorax was performed with low dose, lung screening protocol.  No contrast was administered.  Sagittal and coronal reconstructions were obtained.    The CT examination was performed using one or more of the following dose reduction techniques: Automated exposure control, adjustment of the mA and kV according to patient's size, use of acute or iterative reconstruction techniques.    COMPARISON:  7/2/2020    FINDINGS:  Lungs: There are no abnormal opacities that require further evaluation.  The " lungs show diffuse similar findings consistent with emphysema.    Pleura:   No effusion.    Heart and pericardium: Prior CABG    Aorta and vasculature: Atherosclerosis including coronary arteries.    Chest wall and skeletal structures: Unremarkable except age-appropriate degenerative changes.    Upper abdomen: Unremarkable.  Impression: Lung-RADS Category:  1 - Negative - Continue annual screening with LDCT in 12 months.    Clinically or potentially clinically significant non lung cancer finding:  None.    Prior Lung Cancer Modifier:  No history of prior lung cancer.    Electronically signed by: Bang Schilling  Date:    04/26/2024  Time:    09:29         **Labs and x-rays personally reviewed by me    ** reviewed           Assessment & Plan:       1. Proteinuria, unspecified type  -     Ambulatory referral/consult to Nephrology; Future; Expected date: 05/20/2024    2. Gout, unspecified cause, unspecified chronicity, unspecified site            Michael Weems MD

## 2024-05-31 ENCOUNTER — EXTERNAL CHRONIC CARE MANAGEMENT (OUTPATIENT)
Dept: FAMILY MEDICINE | Facility: CLINIC | Age: 74
End: 2024-05-31
Payer: MEDICARE

## 2024-05-31 PROCEDURE — G0511 CCM/BHI BY RHC/FQHC 20MIN MO: HCPCS | Mod: ,,, | Performed by: INTERNAL MEDICINE

## 2024-06-30 ENCOUNTER — EXTERNAL CHRONIC CARE MANAGEMENT (OUTPATIENT)
Dept: FAMILY MEDICINE | Facility: CLINIC | Age: 74
End: 2024-06-30
Payer: MEDICARE

## 2024-06-30 PROCEDURE — G0511 CCM/BHI BY RHC/FQHC 20MIN MO: HCPCS | Mod: ,,, | Performed by: INTERNAL MEDICINE

## 2024-07-08 PROBLEM — Z00.00 ENCOUNTER FOR SUBSEQUENT ANNUAL WELLNESS VISIT (AWV) IN MEDICARE PATIENT: Status: RESOLVED | Noted: 2021-11-29 | Resolved: 2024-07-08

## 2024-07-17 ENCOUNTER — HOSPITAL ENCOUNTER (EMERGENCY)
Facility: HOSPITAL | Age: 74
Discharge: HOME | End: 2024-07-17
Payer: MEDICARE

## 2024-07-17 VITALS
BODY MASS INDEX: 28.2 KG/M2 | SYSTOLIC BLOOD PRESSURE: 179 MMHG | TEMPERATURE: 98 F | DIASTOLIC BLOOD PRESSURE: 68 MMHG | WEIGHT: 197 LBS | RESPIRATION RATE: 16 BRPM | OXYGEN SATURATION: 97 % | HEART RATE: 60 BPM | HEIGHT: 70 IN

## 2024-07-17 DIAGNOSIS — M10.332 ACUTE GOUT DUE TO RENAL IMPAIRMENT INVOLVING LEFT WRIST: Primary | ICD-10-CM

## 2024-07-17 DIAGNOSIS — M25.532 LEFT WRIST PAIN: ICD-10-CM

## 2024-07-17 DIAGNOSIS — M25.522 LEFT ELBOW PAIN: ICD-10-CM

## 2024-07-17 DIAGNOSIS — M10.322 ACUTE GOUT DUE TO RENAL IMPAIRMENT INVOLVING LEFT ELBOW: ICD-10-CM

## 2024-07-17 LAB
ALBUMIN SERPL BCP-MCNC: 3.6 G/DL (ref 3.5–5)
ALBUMIN/GLOB SERPL: 0.8 {RATIO}
ALP SERPL-CCNC: 89 U/L (ref 45–115)
ALT SERPL W P-5'-P-CCNC: 21 U/L (ref 16–61)
ANION GAP SERPL CALCULATED.3IONS-SCNC: 11 MMOL/L (ref 7–16)
AST SERPL W P-5'-P-CCNC: 17 U/L (ref 15–37)
BASOPHILS # BLD AUTO: 0.03 K/UL (ref 0–0.2)
BASOPHILS NFR BLD AUTO: 0.4 % (ref 0–1)
BILIRUB SERPL-MCNC: 0.3 MG/DL (ref ?–1.2)
BILIRUB UR QL STRIP: NEGATIVE
BUN SERPL-MCNC: 29 MG/DL (ref 7–18)
BUN/CREAT SERPL: 15 (ref 6–20)
CALCIUM SERPL-MCNC: 9.2 MG/DL (ref 8.5–10.1)
CHLORIDE SERPL-SCNC: 104 MMOL/L (ref 98–107)
CLARITY UR: CLEAR
CO2 SERPL-SCNC: 21 MMOL/L (ref 21–32)
COLOR UR: COLORLESS
CREAT SERPL-MCNC: 1.98 MG/DL (ref 0.7–1.3)
CRP SERPL-MCNC: 7.73 MG/DL (ref 0–0.8)
DIFFERENTIAL METHOD BLD: ABNORMAL
EGFR (NO RACE VARIABLE) (RUSH/TITUS): 35 ML/MIN/1.73M2
EOSINOPHIL # BLD AUTO: 0.11 K/UL (ref 0–0.5)
EOSINOPHIL NFR BLD AUTO: 1.4 % (ref 1–4)
ERYTHROCYTE [DISTWIDTH] IN BLOOD BY AUTOMATED COUNT: 15.4 % (ref 11.5–14.5)
GLOBULIN SER-MCNC: 4.7 G/DL (ref 2–4)
GLUCOSE SERPL-MCNC: 108 MG/DL (ref 74–106)
GLUCOSE UR STRIP-MCNC: NORMAL MG/DL
HCT VFR BLD AUTO: 35.1 % (ref 40–54)
HGB BLD-MCNC: 12.1 G/DL (ref 13.5–18)
IMM GRANULOCYTES # BLD AUTO: 0.02 K/UL (ref 0–0.04)
IMM GRANULOCYTES NFR BLD: 0.3 % (ref 0–0.4)
KETONES UR STRIP-SCNC: NEGATIVE MG/DL
LEUKOCYTE ESTERASE UR QL STRIP: ABNORMAL
LYMPHOCYTES # BLD AUTO: 1.75 K/UL (ref 1–4.8)
LYMPHOCYTES NFR BLD AUTO: 22.6 % (ref 27–41)
MAGNESIUM SERPL-MCNC: 2.4 MG/DL (ref 1.7–2.3)
MCH RBC QN AUTO: 27.8 PG (ref 27–31)
MCHC RBC AUTO-ENTMCNC: 34.5 G/DL (ref 32–36)
MCV RBC AUTO: 80.5 FL (ref 80–96)
MONOCYTES # BLD AUTO: 0.77 K/UL (ref 0–0.8)
MONOCYTES NFR BLD AUTO: 9.9 % (ref 2–6)
MPC BLD CALC-MCNC: 11.4 FL (ref 9.4–12.4)
NEUTROPHILS # BLD AUTO: 5.06 K/UL (ref 1.8–7.7)
NEUTROPHILS NFR BLD AUTO: 65.4 % (ref 53–65)
NITRITE UR QL STRIP: NEGATIVE
NRBC # BLD AUTO: 0 X10E3/UL
NRBC, AUTO (.00): 0 %
PH UR STRIP: 5.5 PH UNITS
PLATELET # BLD AUTO: 217 K/UL (ref 150–400)
POTASSIUM SERPL-SCNC: 4.8 MMOL/L (ref 3.5–5.1)
PROT SERPL-MCNC: 8.3 G/DL (ref 6.4–8.2)
PROT UR QL STRIP: 20
RBC # BLD AUTO: 4.36 M/UL (ref 4.6–6.2)
RBC # UR STRIP: NEGATIVE /UL
RBC #/AREA URNS HPF: <1 /HPF
SODIUM SERPL-SCNC: 131 MMOL/L (ref 136–145)
SP GR UR STRIP: 1.01
SQUAMOUS #/AREA URNS LPF: ABNORMAL /HPF
URATE SERPL-MCNC: 6.6 MG/DL (ref 3.5–7.2)
UROBILINOGEN UR STRIP-ACNC: NORMAL MG/DL
WBC # BLD AUTO: 7.74 K/UL (ref 4.5–11)
WBC #/AREA URNS HPF: 5 /HPF

## 2024-07-17 PROCEDURE — 85025 COMPLETE CBC W/AUTO DIFF WBC: CPT | Performed by: NURSE PRACTITIONER

## 2024-07-17 PROCEDURE — 80053 COMPREHEN METABOLIC PANEL: CPT | Performed by: NURSE PRACTITIONER

## 2024-07-17 PROCEDURE — 86140 C-REACTIVE PROTEIN: CPT | Performed by: NURSE PRACTITIONER

## 2024-07-17 PROCEDURE — 99284 EMERGENCY DEPT VISIT MOD MDM: CPT | Mod: 25

## 2024-07-17 PROCEDURE — 83735 ASSAY OF MAGNESIUM: CPT | Performed by: NURSE PRACTITIONER

## 2024-07-17 PROCEDURE — 36415 COLL VENOUS BLD VENIPUNCTURE: CPT | Performed by: NURSE PRACTITIONER

## 2024-07-17 PROCEDURE — 84550 ASSAY OF BLOOD/URIC ACID: CPT | Performed by: NURSE PRACTITIONER

## 2024-07-17 PROCEDURE — 63600175 PHARM REV CODE 636 W HCPCS: Performed by: NURSE PRACTITIONER

## 2024-07-17 PROCEDURE — 96372 THER/PROPH/DIAG INJ SC/IM: CPT | Performed by: NURSE PRACTITIONER

## 2024-07-17 PROCEDURE — 81003 URINALYSIS AUTO W/O SCOPE: CPT | Performed by: NURSE PRACTITIONER

## 2024-07-17 RX ORDER — DEXAMETHASONE SODIUM PHOSPHATE 4 MG/ML
4 INJECTION, SOLUTION INTRA-ARTICULAR; INTRALESIONAL; INTRAMUSCULAR; INTRAVENOUS; SOFT TISSUE
Status: COMPLETED | OUTPATIENT
Start: 2024-07-17 | End: 2024-07-17

## 2024-07-17 RX ORDER — COLCHICINE 0.6 MG/1
TABLET ORAL
Qty: 2 TABLET | Refills: 0 | Status: SHIPPED | OUTPATIENT
Start: 2024-07-17

## 2024-07-17 RX ORDER — ONDANSETRON HYDROCHLORIDE 2 MG/ML
4 INJECTION, SOLUTION INTRAVENOUS
Status: COMPLETED | OUTPATIENT
Start: 2024-07-17 | End: 2024-07-17

## 2024-07-17 RX ORDER — TRAMADOL HYDROCHLORIDE 50 MG/1
50 TABLET ORAL EVERY 6 HOURS PRN
Qty: 12 TABLET | Refills: 0 | Status: SHIPPED | OUTPATIENT
Start: 2024-07-17

## 2024-07-17 RX ORDER — MORPHINE SULFATE 4 MG/ML
4 INJECTION, SOLUTION INTRAMUSCULAR; INTRAVENOUS
Status: COMPLETED | OUTPATIENT
Start: 2024-07-17 | End: 2024-07-17

## 2024-07-17 RX ADMIN — DEXAMETHASONE SODIUM PHOSPHATE 4 MG: 4 INJECTION, SOLUTION INTRA-ARTICULAR; INTRALESIONAL; INTRAMUSCULAR; INTRAVENOUS; SOFT TISSUE at 10:07

## 2024-07-17 RX ADMIN — MORPHINE SULFATE 4 MG: 4 INJECTION, SOLUTION INTRAMUSCULAR; INTRAVENOUS at 10:07

## 2024-07-17 RX ADMIN — ONDANSETRON 4 MG: 2 INJECTION INTRAMUSCULAR; INTRAVENOUS at 10:07

## 2024-07-18 NOTE — ED PROVIDER NOTES
Encounter Date: 7/17/2024       History     Chief Complaint   Patient presents with    Gout     Left arm pain      Patient presents to the ER with complaint of left elbow and wrist pain.  Patient reports his symptoms started 2 days ago.  He denies injury or accident.  Patient reports history of gout.  He states he has been taking some gout medication but Dr. Serrano and Nephrology took him off some other medications.  Patient reports his primary care provider is Dr. Traylor but he was not able to get an appointment to see him in the office.    The history is provided by the patient. No  was used.     Review of patient's allergies indicates:  No Known Allergies  Past Medical History:   Diagnosis Date    Adenomatous polyp of ascending colon 11/29/2021    Adenomatous polyp of descending colon 11/29/2021    Adenomatous polyp of transverse colon 11/29/2021    Anticoagulant long-term use     Blood in stool 11/29/2021    CAD (coronary artery disease)     CHF (congestive heart failure)     COPD (chronic obstructive pulmonary disease)     SOB with walking distances 10/2022    GERD (gastroesophageal reflux disease)     Gout     HH (hiatus hernia) 05/16/2022    HTN (hypertension)     Hyperlipidemia     Iron deficiency anemia due to chronic blood loss 11/29/2021    follows with : Anemia, Monoclonal Gammopathy    Orchitis 01/20/2022    Osteoarthritis     Polyp of splenic flexure of colon 11/29/2021    Screening for colon cancer 11/29/2021    Urinary incontinence 01/20/2022    Vocal cord polyps     followed by  2022     Past Surgical History:   Procedure Laterality Date    CORONARY ARTERY BYPASS GRAFT (CABG)      DIRECT LARYNGOSCOPY Bilateral 05/10/2022    Procedure: LARYNGOSCOPY, DIRECT;  Surgeon: Jesse Smalls MD;  Location: [facility];  Service: ENT;  Laterality: Bilateral;    DIRECT LARYNGOSCOPY N/A 11/11/2022    Procedure: LARYNGOSCOPY, DIRECT;  Surgeon: Jesse Smalls MD;  Location:  [facility] OR;  Service: ENT;  Laterality: N/A;    DIRECT LARYNGOSCOPY N/A 8/15/2023    Procedure: LARYNGOSCOPY, DIRECT;  Surgeon: Jesse Smalls MD;  Location: [facility] OR;  Service: ENT;  Laterality: N/A;    TONSILLECTOMY N/A     at age 18    VOCAL FOLD LESION EXCISION Bilateral 05/10/2022    Procedure: EXCISION, LESION, VOCAL CORD;  Surgeon: Jesse Smalls MD;  Location: [facility] OR;  Service: ENT;  Laterality: Bilateral;    VOCAL FOLD LESION EXCISION N/A 11/11/2022    Procedure: EXCISION, LESION, VOCAL CORD POLYP;  Surgeon: Jesse Smalls MD;  Location: [facility] OR;  Service: ENT;  Laterality: N/A;    VOCAL FOLD LESION EXCISION Right 8/15/2023    Procedure: EXCISION, LESION, VOCAL CORD;  Surgeon: Jesse Smalls MD;  Location: [facility] OR;  Service: ENT;  Laterality: Right;     Family History   Problem Relation Name Age of Onset    Hypertension Mother       Social History     Tobacco Use    Smoking status: Some Days     Current packs/day: 0.50     Average packs/day: 0.5 packs/day for 56.0 years (28.0 ttl pk-yrs)     Types: Cigarettes     Passive exposure: Current    Smokeless tobacco: Current    Tobacco comments:     Smokes 3 cigarettes daily   Substance Use Topics    Alcohol use: Yes     Alcohol/week: 2.0 standard drinks of alcohol     Types: 2 Cans of beer per week     Comment: occasionally    Drug use: Never     Review of Systems   Constitutional:  Positive for activity change.   Musculoskeletal:  Positive for arthralgias.        Left elbow and wrist pain and edema.   All other systems reviewed and are negative.      Physical Exam     Initial Vitals [07/17/24 2055]   BP Pulse Resp Temp SpO2   (!) 165/65 64 18 98.4 °F (36.9 °C) 96 %      MAP       --         Physical Exam    Nursing note and vitals reviewed.  Constitutional: He appears well-developed and well-nourished.   HENT:   Head: Normocephalic.   Nose: Nose normal.   Mouth/Throat: Oropharynx is clear and moist.   Eyes:  Conjunctivae and EOM are normal.   Neck:   Normal range of motion.  Cardiovascular:  Normal rate, normal heart sounds and intact distal pulses.           Pulmonary/Chest: Breath sounds normal.   Abdominal: Abdomen is soft. Bowel sounds are normal.   Musculoskeletal:         General: Tenderness and edema present. Normal range of motion.      Cervical back: Normal range of motion.      Comments: Left elbow and wrist edema and tenderness to palpation limited range of motion due to pain.  Joints are also warm to touch     Neurological: He is alert and oriented to person, place, and time. He has normal strength. GCS score is 15. GCS eye subscore is 4. GCS verbal subscore is 5. GCS motor subscore is 6.   Skin: Skin is warm and dry. Capillary refill takes less than 2 seconds.   Psychiatric: He has a normal mood and affect. Thought content normal.         Medical Screening Exam   See Full Note    ED Course   Procedures  Labs Reviewed   URINALYSIS, REFLEX TO URINE CULTURE - Abnormal; Notable for the following components:       Result Value    Leukocytes, UA Small (*)     Protein, UA 20 (*)     All other components within normal limits   COMPREHENSIVE METABOLIC PANEL - Abnormal; Notable for the following components:    Sodium 131 (*)     Glucose 108 (*)     BUN 29 (*)     Creatinine 1.98 (*)     Total Protein 8.3 (*)     Globulin 4.7 (*)     eGFR 35 (*)     All other components within normal limits   MAGNESIUM - Abnormal; Notable for the following components:    Magnesium 2.4 (*)     All other components within normal limits   C-REACTIVE PROTEIN - Abnormal; Notable for the following components:    CRP 7.73 (*)     All other components within normal limits   CBC WITH DIFFERENTIAL - Abnormal; Notable for the following components:    RBC 4.36 (*)     Hemoglobin 12.1 (*)     Hematocrit 35.1 (*)     RDW 15.4 (*)     Neutrophils % 65.4 (*)     Lymphocytes % 22.6 (*)     Monocytes % 9.9 (*)     All other components within normal  limits   URINALYSIS, MICROSCOPIC - Abnormal; Notable for the following components:    Squamous Epithelial Cells, UA Occasional (*)     All other components within normal limits   URIC ACID - Normal   CBC W/ AUTO DIFFERENTIAL    Narrative:     The following orders were created for panel order CBC auto differential.  Procedure                               Abnormality         Status                     ---------                               -----------         ------                     CBC with Differential[[id number]]       Abnormal            Final result                 Please view results for these tests on the individual orders.          Imaging Results              X-Ray Elbow Complete Left (In process)                      X-Ray Wrist Complete Left (In process)                      Medications   morphine injection 4 mg (4 mg Intramuscular Given 7/17/24 2234)   ondansetron injection 4 mg (4 mg Intramuscular Given 7/17/24 2234)   dexAMETHasone injection 4 mg (4 mg Intramuscular Given 7/17/24 2234)     Medical Decision Making  Patient presents to the ER with complaint of left elbow and wrist pain.  Patient reports his symptoms started 2 days ago.  He denies injury or accident.  Patient reports history of gout.  He states he has been taking some gout medication but Dr. Serrano and Nephrology took him off some other medications.  Patient reports his primary care provider is Dr. Traylor but he was not able to get an appointment to see him in the office.      Amount and/or Complexity of Data Reviewed  Labs: ordered. Decision-making details documented in ED Course.  Radiology: ordered. Decision-making details documented in ED Course.  Discussion of management or test interpretation with external provider(s): Morphine 4 mg IM, Zofran 4 mg IM to treat pain and prevent nausea  Patient verbalized improvement of pain prior to discharge  Decadron 4 mg IM to treat inflammation  Patient was discharged home with diagnosis of  acute gout left elbow and left wrist due to renal impairment.  He was given a prescription for colchicine and tramadol to take as prescribed.  He was told to rest ice and elevate extremity for comfort he was told to follow up with Dr. Weems in 2 days if symptoms do not improve with treatment.  Patient verbalizes understanding and agrees with plan of care.    Risk  Prescription drug management.                                      Clinical Impression:   Final diagnoses:  [M25.522] Left elbow pain  [M25.532] Left wrist pain  [M10.332] Acute gout due to renal impairment involving left wrist (Primary)  [M10.322] Acute gout due to renal impairment involving left elbow        ED Disposition Condition    Discharge Stable          ED Prescriptions       Medication Sig Dispense Start Date End Date Auth. Provider    colchicine (COLCRYS) 0.6 mg tablet Take one tablet by mouth and then repeat dose in 1 hour. 2 tablet 7/17/2024 -- Janis Horn FNP    traMADoL (ULTRAM) 50 mg tablet Take 1 tablet (50 mg total) by mouth every 6 (six) hours as needed for Pain. 12 tablet 7/17/2024 -- Janis Horn FNP          Follow-up Information       Follow up With Specialties Details Why Contact Info    Michael Weems MD Internal Medicine, Family Medicine, Hospitalist Schedule an appointment as soon as possible for a visit in 2 days If symptoms worsen [address] [facility] [address]  [phone]               Janis Horn FNP  07/17/24 1245

## 2024-07-18 NOTE — DISCHARGE INSTRUCTIONS
Take medications as prescribed.  Follow up with Dr. Weems in 2 days if symptoms do not improve with treatment.  Return to the ER with new or worsening symptoms.

## 2024-07-31 ENCOUNTER — EXTERNAL CHRONIC CARE MANAGEMENT (OUTPATIENT)
Dept: FAMILY MEDICINE | Facility: CLINIC | Age: 74
End: 2024-07-31
Payer: MEDICARE

## 2024-07-31 PROCEDURE — G0511 CCM/BHI BY RHC/FQHC 20MIN MO: HCPCS | Mod: ,,, | Performed by: INTERNAL MEDICINE

## 2024-08-13 ENCOUNTER — OFFICE VISIT (OUTPATIENT)
Dept: FAMILY MEDICINE | Facility: CLINIC | Age: 74
End: 2024-08-13
Payer: MEDICARE

## 2024-08-13 VITALS
TEMPERATURE: 98 F | DIASTOLIC BLOOD PRESSURE: 67 MMHG | RESPIRATION RATE: 18 BRPM | BODY MASS INDEX: 28.49 KG/M2 | HEART RATE: 60 BPM | HEIGHT: 70 IN | SYSTOLIC BLOOD PRESSURE: 133 MMHG | WEIGHT: 199 LBS | OXYGEN SATURATION: 97 %

## 2024-08-13 DIAGNOSIS — I10 ESSENTIAL (PRIMARY) HYPERTENSION: ICD-10-CM

## 2024-08-13 DIAGNOSIS — M19.022 OSTEOARTHRITIS OF LEFT ELBOW, UNSPECIFIED OSTEOARTHRITIS TYPE: Primary | ICD-10-CM

## 2024-08-13 PROCEDURE — 99213 OFFICE O/P EST LOW 20 MIN: CPT | Mod: ,,, | Performed by: INTERNAL MEDICINE

## 2024-08-13 PROCEDURE — 3060F POS MICROALBUMINURIA REV: CPT | Mod: ,,, | Performed by: INTERNAL MEDICINE

## 2024-08-13 PROCEDURE — 3008F BODY MASS INDEX DOCD: CPT | Mod: ,,, | Performed by: INTERNAL MEDICINE

## 2024-08-13 PROCEDURE — 1101F PT FALLS ASSESS-DOCD LE1/YR: CPT | Mod: ,,, | Performed by: INTERNAL MEDICINE

## 2024-08-13 PROCEDURE — 3066F NEPHROPATHY DOC TX: CPT | Mod: ,,, | Performed by: INTERNAL MEDICINE

## 2024-08-13 PROCEDURE — 3075F SYST BP GE 130 - 139MM HG: CPT | Mod: ,,, | Performed by: INTERNAL MEDICINE

## 2024-08-13 PROCEDURE — 1126F AMNT PAIN NOTED NONE PRSNT: CPT | Mod: ,,, | Performed by: INTERNAL MEDICINE

## 2024-08-13 PROCEDURE — 3288F FALL RISK ASSESSMENT DOCD: CPT | Mod: ,,, | Performed by: INTERNAL MEDICINE

## 2024-08-13 PROCEDURE — 3078F DIAST BP <80 MM HG: CPT | Mod: ,,, | Performed by: INTERNAL MEDICINE

## 2024-08-13 PROCEDURE — 4010F ACE/ARB THERAPY RXD/TAKEN: CPT | Mod: ,,, | Performed by: INTERNAL MEDICINE

## 2024-08-13 PROCEDURE — 1159F MED LIST DOCD IN RCRD: CPT | Mod: ,,, | Performed by: INTERNAL MEDICINE

## 2024-08-13 RX ORDER — DICLOFENAC SODIUM 75 MG/1
75 TABLET, DELAYED RELEASE ORAL 2 TIMES DAILY
Qty: 30 TABLET | Refills: 0 | Status: SHIPPED | OUTPATIENT
Start: 2024-08-13

## 2024-08-20 PROBLEM — M19.022 OSTEOARTHRITIS OF LEFT ELBOW: Status: ACTIVE | Noted: 2024-08-20

## 2024-08-20 NOTE — PROGRESS NOTES
Subjective:       Patient ID: Sami Kerns is a 74 y.o. male.    Chief Complaint: Hypertension and Health Maintenance (Care gaps refuse during today's visit)    HPI  .  Patient presents with chronic option also complains of moderate pain to bow there is some tenderness to the left elbow.  But no weakness.    Current Medications:    Current Outpatient Medications:     albuterol (PROVENTIL/VENTOLIN HFA) 90 mcg/actuation inhaler, INHALE 2 PUFFS BY MOUTH EVERY 6 HOURS AS NEEDED FOR WHEEZING, Disp: 18 g, Rfl: 5    allopurinoL (ZYLOPRIM) 100 MG tablet, Take 1 tablet (100 mg total) by mouth once daily., Disp: 90 tablet, Rfl: 1    amiodarone (PACERONE) 200 MG Tab, Take by mouth once daily., Disp: , Rfl:     ascorbic acid, vitamin C, (VITAMIN C) 1000 MG tablet, Take 1,000 mg by mouth once daily., Disp: , Rfl:     aspirin (ECOTRIN) 81 MG EC tablet, Take 81 mg by mouth once daily., Disp: , Rfl:     atorvastatin (LIPITOR) 80 MG tablet, Take 1 tablet by mouth once daily., Disp: , Rfl:     cholecalciferol, vitamin D3, (VITAMIN D3) 125 mcg (5,000 unit) Tab, Take 2 tablets (10,000 Units total) by mouth 3 (three) times a week. Start after taking Vitamin D 50,000 units is complete., Disp: 60 tablet, Rfl: 0    cilostazoL (PLETAL) 100 MG Tab, Take 100 mg by mouth 2 (two) times daily., Disp: , Rfl:     clopidogreL (PLAVIX) 75 mg tablet, Take 1 tablet (75 mg total) by mouth once daily., Disp: 90 tablet, Rfl: 1    colchicine (COLCRYS) 0.6 mg tablet, Take one tablet by mouth and then repeat dose in 1 hour., Disp: 2 tablet, Rfl: 0    doxycycline (VIBRAMYCIN) 100 MG Cap, Take 1 capsule (100 mg total) by mouth 2 (two) times a day., Disp: 20 capsule, Rfl: 0    ergocalciferol (ERGOCALCIFEROL) 50,000 unit Cap, Take 1 capsule (50,000 Units total) by mouth every 7 days. Take for 6 weeks., Disp: 6 capsule, Rfl: 0    ezetimibe (ZETIA) 10 mg tablet, Take 10 mg by mouth once daily., Disp: , Rfl:     ferrous sulfate (FEOSOL) 325 mg (65 mg iron) Tab  tablet, Take 1 tablet (325 mg total) by mouth 3 (three) times daily., Disp: 90 tablet, Rfl: 1    furosemide (LASIX) 40 MG tablet, Take 40 mg by mouth once daily., Disp: , Rfl:     hydrALAZINE (APRESOLINE) 50 MG tablet, Take 1 tablet (50 mg total) by mouth 3 (three) times daily., Disp: 90 tablet, Rfl: 1    HYDROcodone-acetaminophen (NORCO) 5-325 mg per tablet, , Disp: , Rfl:     HYDROcodone-acetaminophen (NORCO) 7.5-325 mg per tablet, Take 1 tablet by mouth daily as needed for Pain., Disp: 12 tablet, Rfl: 0    indomethacin (INDOCIN) 25 MG capsule, Take 1 capsule (25 mg total) by mouth 2 (two) times daily as needed (for pain)., Disp: 9 capsule, Rfl: 3    isosorbide mononitrate (IMDUR) 30 MG 24 hr tablet, Take 15 mg by mouth., Disp: , Rfl:     latanoprost 0.005 % ophthalmic solution, Place 1 drop into both eyes every evening., Disp: , Rfl:     losartan (COZAAR) 50 MG tablet, Take 2 tablets (100 mg total) by mouth once daily., Disp: 90 tablet, Rfl: 1    metoprolol succinate (TOPROL-XL) 50 MG 24 hr tablet, Take 1 tablet by mouth once daily., Disp: , Rfl:     naproxen (NAPROSYN) 500 MG tablet, TAKE ONE TABLET BY MOUTH TWICE DAILY AS NEEDED, Disp: 20 tablet, Rfl: 4    nitroGLYCERIN (NITROSTAT) 0.4 MG SL tablet, Place under the tongue., Disp: , Rfl:     predniSONE (DELTASONE) 10 MG tablet, Take 1 tablet (10 mg total) by mouth 3 (three) times daily., Disp: 9 tablet, Rfl: 0    tamsulosin (FLOMAX) 0.4 mg Cap, TAKE ONE CAPSULE BY MOUTH EVERY DAY, Disp: 90 capsule, Rfl: 3    timolol maleate 0.5% (TIMOPTIC) 0.5 % Drop, Place into both eyes., Disp: , Rfl:     traMADoL (ULTRAM) 50 mg tablet, Take 1 tablet (50 mg total) by mouth every 6 (six) hours as needed for Pain., Disp: 12 tablet, Rfl: 0    valsartan (DIOVAN) 320 MG tablet, Take 320 mg by mouth., Disp: , Rfl:     vitamin E 100 UNIT capsule, Take 100 Units by mouth once daily., Disp: , Rfl:     diclofenac (VOLTAREN) 75 MG EC tablet, Take 1 tablet (75 mg total) by mouth 2 (two)  "times daily., Disp: 30 tablet, Rfl: 0  No current facility-administered medications for this visit.    Facility-Administered Medications Ordered in Other Visits:     0.9%  NaCl infusion, , Intravenous, Continuous, Jesse Smalls MD, Last Rate: 50 mL/hr at 08/15/23 0714, New Bag at 08/15/23 0714           ROS  Twelve point system reviewed, unremarkable except for stated above in HPI.        Objective:         Vitals:    08/13/24 0949 08/13/24 0950   BP: (!) 105/49 133/67  Comment: at home   BP Location: Left arm    Patient Position: Sitting    BP Method: Large (Automatic)    Pulse: 60    Resp: 18    Temp: 97.7 °F (36.5 °C)    TempSrc: Temporal    SpO2: 97%    Weight: 90.3 kg (199 lb)    Height: 5' 10" (1.778 m)         Physical Exam     Patient is awake alert oriented person place and  Lungs are clear to auscultation bilaterally no crackles or wheezes   Cardiovascular S1-S2 regular rate and rhythm no murmurs rubs or gallops   Abdomen is soft positive bowel sounds nontender, extremities no clubbing cyanosis edema  Neuro no focal neurological deficits  Skin warm and dry.     Last Labs:     Admission on 07/17/2024, Discharged on 07/17/2024   Component Date Value    Color, UA 07/17/2024 Colorless     Clarity, UA 07/17/2024 Clear     pH, UA 07/17/2024 5.5     Leukocytes, UA 07/17/2024 Small (A)     Nitrites, UA 07/17/2024 Negative     Protein, UA 07/17/2024 20 (A)     Glucose, UA 07/17/2024 Normal     Ketones, UA 07/17/2024 Negative     Urobilinogen, UA 07/17/2024 Normal     Bilirubin, UA 07/17/2024 Negative     Blood, UA 07/17/2024 Negative     Specific Gravity, UA 07/17/2024 1.010     Sodium 07/17/2024 131 (L)     Potassium 07/17/2024 4.8     Chloride 07/17/2024 104     CO2 07/17/2024 21     Anion Gap 07/17/2024 11     Glucose 07/17/2024 108 (H)     BUN 07/17/2024 29 (H)     Creatinine 07/17/2024 1.98 (H)     BUN/Creatinine Ratio 07/17/2024 15     Calcium 07/17/2024 9.2     Total Protein 07/17/2024 8.3 (H)     " Albumin 07/17/2024 3.6     Globulin 07/17/2024 4.7 (H)     A/G Ratio 07/17/2024 0.8     Bilirubin, Total 07/17/2024 0.3     Alk Phos 07/17/2024 89     ALT 07/17/2024 21     AST 07/17/2024 17     eGFR 07/17/2024 35 (L)     Magnesium 07/17/2024 2.4 (H)     CRP 07/17/2024 7.73 (H)     Uric Acid 07/17/2024 6.6     WBC 07/17/2024 7.74     RBC 07/17/2024 4.36 (L)     Hemoglobin 07/17/2024 12.1 (L)     Hematocrit 07/17/2024 35.1 (L)     MCV 07/17/2024 80.5     MCH 07/17/2024 27.8     MCHC 07/17/2024 34.5     RDW 07/17/2024 15.4 (H)     Platelet Count 07/17/2024 217     MPV 07/17/2024 11.4     Neutrophils % 07/17/2024 65.4 (H)     Lymphocytes % 07/17/2024 22.6 (L)     Monocytes % 07/17/2024 9.9 (H)     Eosinophils % 07/17/2024 1.4     Basophils % 07/17/2024 0.4     Immature Granulocytes % 07/17/2024 0.3     nRBC, Auto 07/17/2024 0.0     Neutrophils, Abs 07/17/2024 5.06     Lymphocytes, Absolute 07/17/2024 1.75     Monocytes, Absolute 07/17/2024 0.77     Eosinophils, Absolute 07/17/2024 0.11     Basophils, Absolute 07/17/2024 0.03     Immature Granulocytes, A* 07/17/2024 0.02     nRBC, Absolute 07/17/2024 0.00     Diff Type 07/17/2024 Auto     WBC, UA 07/17/2024 5     RBC, UA 07/17/2024 <1     Squamous Epithelial Cell* 07/17/2024 Occasional (A)        Last Imaging:  X-Ray Wrist Complete Left  Narrative: EXAMINATION:  XR WRIST COMPLETE 3 VIEWS LEFT    CLINICAL HISTORY:  Pain in left wrist    COMPARISON:  None available    TECHNIQUE:  XR WRIST COMPLETE 3 VIEWS LEFT    FINDINGS:  No evidence of fracture seen.  The alignment of the joints appears normal.  Moderate wrist degenerative change is present this is most prominent in the radioulnar joint, radiocarpal and 1st carpometacarpal joint .  No soft tissue abnormality is seen.  Impression: Osteoarthrosis as described above.    Electronically signed by: Lg Ford  Date:    07/18/2024  Time:    07:52  X-Ray Elbow Complete Left  Narrative: EXAMINATION:  XR ELBOW COMPLETE 3  VIEW LEFT    CLINICAL HISTORY:  Pain in left elbow    COMPARISON:  None available    TECHNIQUE:  XR ELBOW COMPLETE 3 VIEW LEFT    FINDINGS:  No evidence of fracture seen.  The alignment of the joints appears normal.  Mild to moderate elbow degenerative change is present.  Prominent olecranon enthesophyte present.  No soft tissue abnormality is seen.  Impression: Elbow osteoarthrosis as described above.    Electronically signed by: Lg Ford  Date:    07/18/2024  Time:    07:51         **Labs and x-rays personally reviewed by me    ** reviewed           Assessment & Plan:       1. Osteoarthritis of left elbow, unspecified osteoarthritis type  -     Ambulatory referral/consult to Orthopedics; Future; Expected date: 08/20/2024    2. Essential (primary) hypertension    Other orders  -     diclofenac (VOLTAREN) 75 MG EC tablet; Take 1 tablet (75 mg total) by mouth 2 (two) times daily.  Dispense: 30 tablet; Refill: 0            Michael Weems MD

## 2024-08-31 ENCOUNTER — EXTERNAL CHRONIC CARE MANAGEMENT (OUTPATIENT)
Dept: FAMILY MEDICINE | Facility: CLINIC | Age: 74
End: 2024-08-31
Payer: MEDICARE

## 2024-08-31 PROCEDURE — G0511 CCM/BHI BY RHC/FQHC 20MIN MO: HCPCS | Mod: ,,, | Performed by: INTERNAL MEDICINE

## 2024-09-18 ENCOUNTER — OFFICE VISIT (OUTPATIENT)
Dept: NEPHROLOGY | Facility: CLINIC | Age: 74
End: 2024-09-18
Payer: MEDICARE

## 2024-09-18 VITALS
HEART RATE: 61 BPM | RESPIRATION RATE: 18 BRPM | OXYGEN SATURATION: 96 % | BODY MASS INDEX: 28.2 KG/M2 | HEIGHT: 70 IN | SYSTOLIC BLOOD PRESSURE: 136 MMHG | DIASTOLIC BLOOD PRESSURE: 66 MMHG | WEIGHT: 197 LBS

## 2024-09-18 DIAGNOSIS — I10 ESSENTIAL HYPERTENSION: ICD-10-CM

## 2024-09-18 DIAGNOSIS — N18.32 STAGE 3B CHRONIC KIDNEY DISEASE: Primary | ICD-10-CM

## 2024-09-18 DIAGNOSIS — I12.9 HYPERTENSIVE NEPHROSCLEROSIS, STAGE 1 THROUGH STAGE 4 OR UNSPECIFIED CHRONIC KIDNEY DISEASE: ICD-10-CM

## 2024-09-18 PROCEDURE — 99214 OFFICE O/P EST MOD 30 MIN: CPT | Mod: S$PBB,,, | Performed by: INTERNAL MEDICINE

## 2024-09-18 PROCEDURE — 4010F ACE/ARB THERAPY RXD/TAKEN: CPT | Mod: CPTII,,, | Performed by: INTERNAL MEDICINE

## 2024-09-18 PROCEDURE — 3288F FALL RISK ASSESSMENT DOCD: CPT | Mod: CPTII,,, | Performed by: INTERNAL MEDICINE

## 2024-09-18 PROCEDURE — 3060F POS MICROALBUMINURIA REV: CPT | Mod: CPTII,,, | Performed by: INTERNAL MEDICINE

## 2024-09-18 PROCEDURE — 1159F MED LIST DOCD IN RCRD: CPT | Mod: CPTII,,, | Performed by: INTERNAL MEDICINE

## 2024-09-18 PROCEDURE — 3075F SYST BP GE 130 - 139MM HG: CPT | Mod: CPTII,,, | Performed by: INTERNAL MEDICINE

## 2024-09-18 PROCEDURE — 99999 PR PBB SHADOW E&M-EST. PATIENT-LVL V: CPT | Mod: PBBFAC,,, | Performed by: INTERNAL MEDICINE

## 2024-09-18 PROCEDURE — 1126F AMNT PAIN NOTED NONE PRSNT: CPT | Mod: CPTII,,, | Performed by: INTERNAL MEDICINE

## 2024-09-18 PROCEDURE — 3008F BODY MASS INDEX DOCD: CPT | Mod: CPTII,,, | Performed by: INTERNAL MEDICINE

## 2024-09-18 PROCEDURE — 1101F PT FALLS ASSESS-DOCD LE1/YR: CPT | Mod: CPTII,,, | Performed by: INTERNAL MEDICINE

## 2024-09-18 PROCEDURE — 99215 OFFICE O/P EST HI 40 MIN: CPT | Mod: PBBFAC | Performed by: INTERNAL MEDICINE

## 2024-09-18 PROCEDURE — 3078F DIAST BP <80 MM HG: CPT | Mod: CPTII,,, | Performed by: INTERNAL MEDICINE

## 2024-09-18 PROCEDURE — 3066F NEPHROPATHY DOC TX: CPT | Mod: CPTII,,, | Performed by: INTERNAL MEDICINE

## 2024-09-18 NOTE — PROGRESS NOTES
[facility] History and Physical  Patient Name: Sami Kerns  MRN: [id number]  Age: 74 y.o.  : 1950    Date: 2024 2:25 PM    Chief Complaint: Follow Up    HPI :   Mr Kerns presents to Nephrology clinic for follow up. He is followed by Dr. Michael Weems MD as her primary care provider.     HTN: diagnosed about 15 years ago. Current regimen includes lasix 40 mg daily, losartan 50 mg daily, metoprolol 50 mg daily, valsartan 320 mg daily, hydralazine 50 mg BID. BP low today,. No s/s hypotension     CAD s/p CABG in 2017/CHF. ASA. Follows with Dr. Celina Melchor     Monoclonal gammopathy: He is followed by Dr. Arenas.     Nephrology history: FH of kidney disease- sister.  No nephrolithiasis, or recurrent UTIs. Takes Naproxen 500 mg as needed. Takes OTC ibuprofen as well.    Patient denies any CP, SOB, peripheral edema, dysuria, hematuria, changes in urinary habits, or increased frequency of urination. He was recently prescribed indomethacin and diclofenac for pain. He reports finishing these    Past Medical History:  has a past medical history of Adenomatous polyp of ascending colon (2021), Adenomatous polyp of descending colon (2021), Adenomatous polyp of transverse colon (2021), Anticoagulant long-term use, Blood in stool (2021), CAD (coronary artery disease), CHF (congestive heart failure), COPD (chronic obstructive pulmonary disease), GERD (gastroesophageal reflux disease), Gout, HH (hiatus hernia) (2022), HTN (hypertension), Hyperlipidemia, Iron deficiency anemia due to chronic blood loss (2021), Orchitis (2022), Osteoarthritis, Polyp of splenic flexure of colon (2021), Screening for colon cancer (2021), Urinary incontinence (2022), and Vocal cord polyps.     Past Surgical History:   has a past surgical history that includes Coronary Artery Bypass Graft (CABG); Tonsillectomy (N/A);  Direct laryngoscopy (Bilateral, 05/10/2022); Vocal fold lesion excision (Bilateral, 05/10/2022); Direct laryngoscopy (N/A, 11/11/2022); Vocal fold lesion excision (N/A, 11/11/2022); Direct laryngoscopy (N/A, 8/15/2023); and Vocal fold lesion excision (Right, 8/15/2023).     Family History:  family history includes Hypertension in his mother. No family history of kidney disease.     Social History:   reports that he has been smoking cigarettes. He has a 28 pack-year smoking history. He has been exposed to tobacco smoke. He uses smokeless tobacco. He reports current alcohol use of about 2.0 standard drinks of alcohol per week. He reports that he does not use drugs.     Allergies: has No Known Allergies.     Medications: Reviewed including OTC medications, herbal supplements, and NSAIDS.     Old records have been reviewed.      Review of Systems:  ROS: A 10 point ROS was completed and found to be negative except for that mentioned above.          Physical Exam:  Vitals:    09/18/24 1317   BP: 136/66   Pulse: 61   Resp: 18           Constitutional: sitting in chair, in NAD  Eyes: EOMI, white sclera  ENMT: moist mucus membranes, nares patent  Cardiovascular: normal rate, S1/S2 noted, no edema  Respiratory: symmetrical chest expansion, CTA-B  Gastrointestinal: +BS, soft, NT/ND  Musculoskeletal: normal, no joint erythema/effusions  Skin: no rash, no purpura, warm extremities  Neurological: Alert and Oriented x 4, afocal    Labs:   Lab Results   Component Value Date     (L) 07/17/2024    K 4.8 07/17/2024    CREATININE 1.98 (H) 07/17/2024    ALT 21 07/17/2024    AST 17 07/17/2024    LDLCALC 37 04/08/2024    CHOL 97 04/08/2024    HDL 40 04/08/2024    TRIG 101 04/08/2024    TSH 0.009 (L) 04/12/2022    WBC 7.74 07/17/2024    HGB 12.1 (L) 07/17/2024    HCT 35.1 (L) 07/17/2024     07/17/2024    PSA 2.770 04/08/2024        Otherwise Reviewed    Assessment/Plan:       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 Baseline sCr TBD today sCr pending. Counseled to avoid nephrotoxic agents such as NSAIDs. Discussed again today. Recommend tylenol first.     Proteinuria: urine Prot:Creat ratio is pending. Patient is on RAAS blockade with ARB    Anemia: CBC pending. Follows with Heme/Onc    BMD: Calcium and Phosphorus PTH, Vit D pending    HTN: well controlled with current meds\    RTC 46months with CBC, RFP, UA, urine for Prot:creat ratio, PTH, Vit D      Alisha S. Parker, DO [facility]   09/18/2024

## 2024-09-30 ENCOUNTER — EXTERNAL CHRONIC CARE MANAGEMENT (OUTPATIENT)
Dept: FAMILY MEDICINE | Facility: CLINIC | Age: 74
End: 2024-09-30
Payer: MEDICARE

## 2024-09-30 PROCEDURE — G0511 CCM/BHI BY RHC/FQHC 20MIN MO: HCPCS | Mod: ,,, | Performed by: INTERNAL MEDICINE

## 2024-10-11 ENCOUNTER — HOSPITAL ENCOUNTER (EMERGENCY)
Facility: HOSPITAL | Age: 74
Discharge: HOME | End: 2024-10-11
Payer: MEDICARE

## 2024-10-11 VITALS
HEART RATE: 58 BPM | RESPIRATION RATE: 16 BRPM | DIASTOLIC BLOOD PRESSURE: 60 MMHG | BODY MASS INDEX: 28.27 KG/M2 | SYSTOLIC BLOOD PRESSURE: 150 MMHG | HEIGHT: 70 IN | TEMPERATURE: 98 F | OXYGEN SATURATION: 99 %

## 2024-10-11 DIAGNOSIS — M54.9 BACK PAIN, UNSPECIFIED BACK LOCATION, UNSPECIFIED BACK PAIN LATERALITY, UNSPECIFIED CHRONICITY: Primary | ICD-10-CM

## 2024-10-11 PROCEDURE — 63600175 PHARM REV CODE 636 W HCPCS: Performed by: NURSE PRACTITIONER

## 2024-10-11 PROCEDURE — 96372 THER/PROPH/DIAG INJ SC/IM: CPT | Performed by: NURSE PRACTITIONER

## 2024-10-11 PROCEDURE — 99284 EMERGENCY DEPT VISIT MOD MDM: CPT | Mod: 25

## 2024-10-11 RX ORDER — METHOCARBAMOL 500 MG/1
1000 TABLET, FILM COATED ORAL 3 TIMES DAILY
Qty: 30 TABLET | Refills: 0 | Status: SHIPPED | OUTPATIENT
Start: 2024-10-11 | End: 2024-10-16

## 2024-10-11 RX ORDER — MORPHINE SULFATE 4 MG/ML
4 INJECTION, SOLUTION INTRAMUSCULAR; INTRAVENOUS
Status: COMPLETED | OUTPATIENT
Start: 2024-10-11 | End: 2024-10-11

## 2024-10-11 RX ADMIN — MORPHINE SULFATE 4 MG: 4 INJECTION INTRAVENOUS at 10:10

## 2024-10-11 NOTE — ED PROVIDER NOTES
Encounter Date: 10/11/2024       History     Chief Complaint   Patient presents with    Back Pain     74-year-old male presents to the emergency department to be evaluated for pain in his upper back.  His pain began a 3 days ago.  Denies any fall or injury, fever, cough, shortness of breath.  His pain increases when he bends or twists.  Denies any numbness or weakness in his lower extremities, loss of control of his bowels or bladder, dysuria, fever, chills.    The history is provided by the patient.   Back Pain   This is a new problem. The current episode started several days ago. Pertinent negatives include no chest pain, no fever, no numbness, no weight loss, no headaches, no abdominal pain, no abdominal swelling, no bowel incontinence, no perianal numbness, no bladder incontinence, no dysuria, no pelvic pain, no leg pain, no paresthesias, no paresis, no tingling and no weakness.     Review of patient's allergies indicates:  No Known Allergies  Past Medical History:   Diagnosis Date    Adenomatous polyp of ascending colon 11/29/2021    Adenomatous polyp of descending colon 11/29/2021    Adenomatous polyp of transverse colon 11/29/2021    Anticoagulant long-term use     Blood in stool 11/29/2021    CAD (coronary artery disease)     CHF (congestive heart failure)     COPD (chronic obstructive pulmonary disease)     SOB with walking distances 10/2022    GERD (gastroesophageal reflux disease)     Gout     HH (hiatus hernia) 05/16/2022    HTN (hypertension)     Hyperlipidemia     Iron deficiency anemia due to chronic blood loss 11/29/2021    follows with : Anemia, Monoclonal Gammopathy    Orchitis 01/20/2022    Osteoarthritis     Polyp of splenic flexure of colon 11/29/2021    Screening for colon cancer 11/29/2021    Urinary incontinence 01/20/2022    Vocal cord polyps     followed by  2022     Past Surgical History:   Procedure Laterality Date    CORONARY ARTERY BYPASS GRAFT (CABG)      DIRECT  LARYNGOSCOPY Bilateral 05/10/2022    Procedure: LARYNGOSCOPY, DIRECT;  Surgeon: Jesse Smalls MD;  Location: [facility] OR;  Service: ENT;  Laterality: Bilateral;    DIRECT LARYNGOSCOPY N/A 11/11/2022    Procedure: LARYNGOSCOPY, DIRECT;  Surgeon: Jesse Smalls MD;  Location: [facility] OR;  Service: ENT;  Laterality: N/A;    DIRECT LARYNGOSCOPY N/A 8/15/2023    Procedure: LARYNGOSCOPY, DIRECT;  Surgeon: Jesse Smalls MD;  Location: [facility] OR;  Service: ENT;  Laterality: N/A;    TONSILLECTOMY N/A     at age 18    VOCAL FOLD LESION EXCISION Bilateral 05/10/2022    Procedure: EXCISION, LESION, VOCAL CORD;  Surgeon: Jesse Smalls MD;  Location: [facility] OR;  Service: ENT;  Laterality: Bilateral;    VOCAL FOLD LESION EXCISION N/A 11/11/2022    Procedure: EXCISION, LESION, VOCAL CORD POLYP;  Surgeon: Jesse Smalls MD;  Location: [facility] OR;  Service: ENT;  Laterality: N/A;    VOCAL FOLD LESION EXCISION Right 8/15/2023    Procedure: EXCISION, LESION, VOCAL CORD;  Surgeon: Jesse Smalls MD;  Location: [facility] OR;  Service: ENT;  Laterality: Right;     Family History   Problem Relation Name Age of Onset    Hypertension Mother       Social History     Tobacco Use    Smoking status: Some Days     Current packs/day: 0.50     Average packs/day: 0.5 packs/day for 56.0 years (28.0 ttl pk-yrs)     Types: Cigarettes     Passive exposure: Current    Smokeless tobacco: Current    Tobacco comments:     Smokes 3 cigarettes daily   Substance Use Topics    Alcohol use: Yes     Alcohol/week: 2.0 standard drinks of alcohol     Types: 2 Cans of beer per week     Comment: occasionally    Drug use: Never     Review of Systems   Constitutional:  Negative for fever and weight loss.   Cardiovascular:  Negative for chest pain.   Gastrointestinal:  Negative for abdominal pain and bowel incontinence.   Genitourinary:  Negative for bladder incontinence, dysuria and pelvic pain.   Musculoskeletal:  Positive for  back pain.   Neurological:  Negative for tingling, weakness, numbness, headaches and paresthesias.   All other systems reviewed and are negative.      Physical Exam     Initial Vitals   BP Pulse Resp Temp SpO2   10/11/24 0948 10/11/24 0948 10/11/24 1042 10/11/24 0948 10/11/24 0948   (!) 150/60 (!) 58 16 97.7 °F (36.5 °C) 99 %      MAP       --                Physical Exam    Vitals reviewed.  Constitutional: He appears well-developed and well-nourished.   Neck: Neck supple.   Cardiovascular:  Normal rate and regular rhythm.           Pulmonary/Chest: Breath sounds normal.   Abdominal: Abdomen is soft. Bowel sounds are normal. He exhibits no distension and no mass. There is no abdominal tenderness. There is no rebound and no guarding.   Musculoskeletal:         General: Normal range of motion.      Cervical back: Neck supple.     Neurological: He is alert and oriented to person, place, and time. He has normal strength. GCS score is 15. GCS eye subscore is 4. GCS verbal subscore is 5. GCS motor subscore is 6.   Skin: Skin is warm and dry. Capillary refill takes less than 2 seconds.   Psychiatric: He has a normal mood and affect.         Medical Screening Exam   See Full Note    ED Course   Procedures  Labs Reviewed - No data to display       Imaging Results              X-Ray Lumbar Spine Ap And Lateral (Final result)  Result time 10/11/24 10:18:17      Final result by Steven Sweeney MD (10/11/24 10:18:17)                   Impression:      Mild multilevel lumbar degenerative disc change.      Electronically signed by: Steven Sweeney MD  Date:    10/11/2024  Time:    10:18               Narrative:    EXAMINATION:  XR LUMBAR SPINE AP AND LATERAL    CLINICAL HISTORY:  back pain;    TECHNIQUE:  AP, lateral and spot images were performed of the lumbar spine.    COMPARISON:  12/01/2021    FINDINGS:  No fracture or malalignment.  There is mild multilevel disc change.  Moderate calcification of the aorta is  present.                                       X-Ray Thoracic Spine AP Lateral (Final result)  Result time 10/11/24 10:17:49      Final result by Steven Sweeney MD (10/11/24 10:17:49)                   Impression:      Mild thoracic degenerative change.      Electronically signed by: Steven Sweeney MD  Date:    10/11/2024  Time:    10:17               Narrative:    EXAMINATION:  XR THORACIC SPINE AP LATERAL    CLINICAL HISTORY:  back pain;    TECHNIQUE:  AP and lateral views of the thoracic spine were performed.    COMPARISON:  None    FINDINGS:  There has been a sternotomy.  No fracture or malalignment.  There is mild multilevel thoracic degenerative disc change.                                       Medications   morphine injection 4 mg (4 mg Intramuscular Given 10/11/24 1042)     Medical Decision Making  74-year-old male presents to the emergency department to be evaluated for pain in his upper back.  His pain began a 3 days ago.  Denies any fall or injury, fever, cough, shortness of breath.  His pain increases when he bends or twists.  Denies any numbness or weakness in his lower extremities, loss of control of his bowels or bladder, dysuria, fever, chills.  X-rays ordered, films reviewed as well as the radiologist's interpretation significant for degenerative changes  Treated with morphine IM  Prescribed Robaxin  Diagnosis: Back pain    Amount and/or Complexity of Data Reviewed  Radiology: ordered.    Risk  Prescription drug management.                                      Clinical Impression:   Final diagnoses:  [M54.9] Back pain, unspecified back location, unspecified back pain laterality, unspecified chronicity (Primary)        ED Disposition Condition    Discharge Stable          ED Prescriptions       Medication Sig Dispense Start Date End Date Auth. Provider    methocarbamoL (ROBAXIN) 500 MG Tab Take 2 tablets (1,000 mg total) by mouth 3 (three) times daily. for 5 days 30 tablet 10/11/2024  10/16/2024 Kiley Baugh, SARA          Follow-up Information    None          Kiley Baugh, SARA  10/11/24 1159

## 2024-10-24 ENCOUNTER — OFFICE VISIT (OUTPATIENT)
Dept: OTOLARYNGOLOGY | Facility: CLINIC | Age: 74
End: 2024-10-24
Payer: MEDICARE

## 2024-10-24 VITALS — HEIGHT: 70 IN | WEIGHT: 197 LBS | BODY MASS INDEX: 28.2 KG/M2

## 2024-10-24 DIAGNOSIS — J38.1 VOCAL CORD POLYPS: Primary | ICD-10-CM

## 2024-10-24 DIAGNOSIS — R49.0 HOARSENESS: ICD-10-CM

## 2024-10-24 PROCEDURE — 99999 PR PBB SHADOW E&M-EST. PATIENT-LVL IV: CPT | Mod: PBBFAC,,, | Performed by: OTOLARYNGOLOGY

## 2024-10-24 PROCEDURE — 99214 OFFICE O/P EST MOD 30 MIN: CPT | Mod: PBBFAC | Performed by: OTOLARYNGOLOGY

## 2024-10-24 PROCEDURE — 31575 DIAGNOSTIC LARYNGOSCOPY: CPT | Mod: PBBFAC | Performed by: OTOLARYNGOLOGY

## 2024-10-24 NOTE — PROGRESS NOTES
Subjective:       Patient ID: Sami Kerns is a 74 y.o. male.    Chief Complaint: Follow-up (6 month follow up vocal cord polyps.)    Follow-up      Review of Systems   HENT:  Negative for trouble swallowing and voice change.    All other systems reviewed and are negative.      Objective:      Physical Exam  General: NAD  Head: Normocephalic, atraumatic, no facial asymmetry/normal strength,  Ears: Both auricules normal in appearance, w/o deformities tympanic membranes normal external auditory canals normal  Nose: External nose w/o deformities normal turbinates no drainage or inflammation  Oral Cavity: Lips, gums, floor of mouth, tongue hard palate, and buccal mucosa without mass/lesion  Oropharynx: Mucosa pink and moist, soft palate, posterior pharynx and oropharyngeal wall without mass/lesion  Neck: Supple, symmetric, trachea midline, no palpable mass/lesion, no palpable cervical lymphadenopathy  Skin: Warm and dry, no concerning lesions  Respiratory: Respirations even, unlabored    Nasal/Nasopharyngo/Laryn/Hypopharyngoscopy Procedures   Procedure: Flexible laryngoscopy  In order to fully examine the upper aerodigestive tract, including the larynx, in a patient with a hyperactive gag reflex, flexible endoscopy is required.  After explaining the procedure and obtaining verbal consent, a timeout was performed with the patient's participation according to the universal protocol. Both nasal cavities were anesthetized with 4% Xylocaine spray mixed with Robinson-Synephrine. The flexible laryngoscope was inserted into the nasal cavity and advanced to visualize the nasal cavity, nasopharynx, the posterior oropharynx, hypopharynx, and the endolarynx with the above findings noted. The scope was removed and the procedure terminated. The patient tolerated this procedure well without apparent complication.      Procedure:  Diagnostic nasal, nasopharyngoscopy, laryngoscopy and hypopharyngoscopy.    Routine preparation with local  atomizer with 1% neosynephrine and lidocaine . With customary flexible endoscope.     NOSE:   External:  No gross deformity   Intranasal:    Mucosa:  No polyps, ulcers or lesions.    Septum:  No gross deformity.    Turbinates:  Not enlarged.    Nasopharynx:  No lesions.   Mucosa:  No lesions.   Posterior Choanae:  Patent.   Eustachian Tubes:  Patent.  Larynx/hypopharynx:   Epiglottis:  No lesions, without edema.   AE Folds:  No lesions.   Vocal cords:  No polyps; nl mobility   Subglottis: No obvious stenosis   Hypopharynx:  No lesions.   Piriform sinus:  No pooling or lesions.   Post Cricoid:  No edema or erythema  Assessment:       1. Vocal cord polyps    2. Hoarseness        Plan:       Doing well no polyps seen   F/u 6 months

## 2024-10-31 ENCOUNTER — EXTERNAL CHRONIC CARE MANAGEMENT (OUTPATIENT)
Dept: FAMILY MEDICINE | Facility: CLINIC | Age: 74
End: 2024-10-31
Payer: MEDICARE

## 2024-10-31 PROCEDURE — G0511 CCM/BHI BY RHC/FQHC 20MIN MO: HCPCS | Mod: ,,, | Performed by: INTERNAL MEDICINE

## 2024-11-13 ENCOUNTER — OFFICE VISIT (OUTPATIENT)
Dept: FAMILY MEDICINE | Facility: CLINIC | Age: 74
End: 2024-11-13
Payer: MEDICARE

## 2024-11-13 VITALS
SYSTOLIC BLOOD PRESSURE: 130 MMHG | TEMPERATURE: 98 F | BODY MASS INDEX: 28.75 KG/M2 | HEART RATE: 65 BPM | OXYGEN SATURATION: 97 % | DIASTOLIC BLOOD PRESSURE: 66 MMHG | HEIGHT: 70 IN | RESPIRATION RATE: 18 BRPM | WEIGHT: 200.81 LBS

## 2024-11-13 DIAGNOSIS — N18.32 STAGE 3B CHRONIC KIDNEY DISEASE: ICD-10-CM

## 2024-11-13 DIAGNOSIS — F17.200 TOBACCO DEPENDENCY: Primary | ICD-10-CM

## 2024-11-13 PROCEDURE — 1159F MED LIST DOCD IN RCRD: CPT | Mod: ,,, | Performed by: INTERNAL MEDICINE

## 2024-11-13 PROCEDURE — 1126F AMNT PAIN NOTED NONE PRSNT: CPT | Mod: ,,, | Performed by: INTERNAL MEDICINE

## 2024-11-13 PROCEDURE — 99213 OFFICE O/P EST LOW 20 MIN: CPT | Mod: ,,, | Performed by: INTERNAL MEDICINE

## 2024-11-13 PROCEDURE — 4010F ACE/ARB THERAPY RXD/TAKEN: CPT | Mod: ,,, | Performed by: INTERNAL MEDICINE

## 2024-11-13 PROCEDURE — 3078F DIAST BP <80 MM HG: CPT | Mod: ,,, | Performed by: INTERNAL MEDICINE

## 2024-11-13 PROCEDURE — 3075F SYST BP GE 130 - 139MM HG: CPT | Mod: ,,, | Performed by: INTERNAL MEDICINE

## 2024-11-13 PROCEDURE — 1101F PT FALLS ASSESS-DOCD LE1/YR: CPT | Mod: ,,, | Performed by: INTERNAL MEDICINE

## 2024-11-13 PROCEDURE — 3288F FALL RISK ASSESSMENT DOCD: CPT | Mod: ,,, | Performed by: INTERNAL MEDICINE

## 2024-11-13 PROCEDURE — 3060F POS MICROALBUMINURIA REV: CPT | Mod: ,,, | Performed by: INTERNAL MEDICINE

## 2024-11-13 PROCEDURE — 3008F BODY MASS INDEX DOCD: CPT | Mod: ,,, | Performed by: INTERNAL MEDICINE

## 2024-11-13 PROCEDURE — 3066F NEPHROPATHY DOC TX: CPT | Mod: ,,, | Performed by: INTERNAL MEDICINE

## 2024-11-17 NOTE — PROGRESS NOTES
Subjective:       Patient ID: Sami Kerns is a 74 y.o. male.    Chief Complaint: Osteoarthritis (3 month fu ) and Health Maintenance (Refuses care gaps )    HPI  .  Patient seen and evaluated today voiced no complaints except continues to smoke tobacco.  He has been advised on smoking several times been offered nicotine patch but states that is not working for him in the past off so offered him Wellbutrin but he did not want Wellbutrin.  Due to the many adverse health risks of smoking tobacco,  Patient has been encouraged to quit smoking, and smoking sensation treatments have been   treatments have been offered and a  smoking cessation class has been recommended to the patient      Current Medications:    Current Outpatient Medications:     albuterol (PROVENTIL/VENTOLIN HFA) 90 mcg/actuation inhaler, INHALE 2 PUFFS BY MOUTH EVERY 6 HOURS AS NEEDED FOR WHEEZING, Disp: 18 g, Rfl: 5    allopurinoL (ZYLOPRIM) 100 MG tablet, Take 1 tablet (100 mg total) by mouth once daily., Disp: 90 tablet, Rfl: 1    amiodarone (PACERONE) 200 MG Tab, Take by mouth once daily., Disp: , Rfl:     ascorbic acid, vitamin C, (VITAMIN C) 1000 MG tablet, Take 1,000 mg by mouth once daily., Disp: , Rfl:     aspirin (ECOTRIN) 81 MG EC tablet, Take 81 mg by mouth once daily., Disp: , Rfl:     atorvastatin (LIPITOR) 80 MG tablet, Take 1 tablet by mouth once daily., Disp: , Rfl:     cilostazoL (PLETAL) 100 MG Tab, Take 100 mg by mouth 2 (two) times daily., Disp: , Rfl:     clopidogreL (PLAVIX) 75 mg tablet, Take 1 tablet (75 mg total) by mouth once daily., Disp: 90 tablet, Rfl: 1    diclofenac (VOLTAREN) 75 MG EC tablet, Take 1 tablet (75 mg total) by mouth 2 (two) times daily., Disp: 30 tablet, Rfl: 0    ezetimibe (ZETIA) 10 mg tablet, Take 10 mg by mouth once daily., Disp: , Rfl:     furosemide (LASIX) 40 MG tablet, Take 40 mg by mouth once daily., Disp: , Rfl:     hydrALAZINE (APRESOLINE) 50 MG tablet, Take 1 tablet (50 mg total) by mouth 3  (three) times daily., Disp: 90 tablet, Rfl: 1    indomethacin (INDOCIN) 25 MG capsule, Take 1 capsule (25 mg total) by mouth 2 (two) times daily as needed (for pain)., Disp: 9 capsule, Rfl: 3    isosorbide mononitrate (IMDUR) 30 MG 24 hr tablet, Take 15 mg by mouth., Disp: , Rfl:     latanoprost 0.005 % ophthalmic solution, Place 1 drop into both eyes every evening., Disp: , Rfl:     losartan (COZAAR) 50 MG tablet, Take 2 tablets (100 mg total) by mouth once daily., Disp: 90 tablet, Rfl: 1    metoprolol succinate (TOPROL-XL) 50 MG 24 hr tablet, Take 1 tablet by mouth once daily., Disp: , Rfl:     naproxen (NAPROSYN) 500 MG tablet, TAKE ONE TABLET BY MOUTH TWICE DAILY AS NEEDED, Disp: 20 tablet, Rfl: 4    nitroGLYCERIN (NITROSTAT) 0.4 MG SL tablet, Place under the tongue., Disp: , Rfl:     tamsulosin (FLOMAX) 0.4 mg Cap, TAKE ONE CAPSULE BY MOUTH EVERY DAY, Disp: 90 capsule, Rfl: 3    timolol maleate 0.5% (TIMOPTIC) 0.5 % Drop, Place into both eyes., Disp: , Rfl:     traMADoL (ULTRAM) 50 mg tablet, Take 1 tablet (50 mg total) by mouth every 6 (six) hours as needed for Pain., Disp: 12 tablet, Rfl: 0    valsartan (DIOVAN) 320 MG tablet, Take 320 mg by mouth., Disp: , Rfl:     colchicine (COLCRYS) 0.6 mg tablet, Take one tablet by mouth and then repeat dose in 1 hour. (Patient not taking: Reported on 11/13/2024), Disp: 2 tablet, Rfl: 0  No current facility-administered medications for this visit.    Facility-Administered Medications Ordered in Other Visits:     0.9%  NaCl infusion, , Intravenous, Continuous, Jesse Smalls MD, Last Rate: 50 mL/hr at 08/15/23 0714, New Bag at 08/15/23 0714           ROS  Twelve point system reviewed, unremarkable except for stated above in HPI.        Objective:         Vitals:    11/13/24 0911   BP: 130/66   BP Location: Left arm   Patient Position: Sitting   Pulse: 65   Resp: 18   Temp: 97.8 °F (36.6 °C)   TempSrc: Temporal   SpO2: 97%   Weight: 91.1 kg (200 lb 12.8 oz)   Height: 5'  "10" (1.778 m)        Physical Exam     Patient is awake alert oriented person place and  Lungs are clear to auscultation bilaterally no crackles or wheezes   Cardiovascular S1-S2 regular rate and rhythm no murmurs rubs or gallops   Abdomen is soft positive bowel sounds nontender, extremities no clubbing cyanosis edema  Neuro no focal neurological deficits  Skin warm and dry.     Last Labs:     No visits with results within 1 Month(s) from this visit.   Latest known visit with results is:   Admission on 07/17/2024, Discharged on 07/17/2024   Component Date Value    Color, UA 07/17/2024 Colorless     Clarity, UA 07/17/2024 Clear     pH, UA 07/17/2024 5.5     Leukocytes, UA 07/17/2024 Small (A)     Nitrites, UA 07/17/2024 Negative     Protein, UA 07/17/2024 20 (A)     Glucose, UA 07/17/2024 Normal     Ketones, UA 07/17/2024 Negative     Urobilinogen, UA 07/17/2024 Normal     Bilirubin, UA 07/17/2024 Negative     Blood, UA 07/17/2024 Negative     Specific Gravity, UA 07/17/2024 1.010     Sodium 07/17/2024 131 (L)     Potassium 07/17/2024 4.8     Chloride 07/17/2024 104     CO2 07/17/2024 21     Anion Gap 07/17/2024 11     Glucose 07/17/2024 108 (H)     BUN 07/17/2024 29 (H)     Creatinine 07/17/2024 1.98 (H)     BUN/Creatinine Ratio 07/17/2024 15     Calcium 07/17/2024 9.2     Total Protein 07/17/2024 8.3 (H)     Albumin 07/17/2024 3.6     Globulin 07/17/2024 4.7 (H)     A/G Ratio 07/17/2024 0.8     Bilirubin, Total 07/17/2024 0.3     Alk Phos 07/17/2024 89     ALT 07/17/2024 21     AST 07/17/2024 17     eGFR 07/17/2024 35 (L)     Magnesium 07/17/2024 2.4 (H)     CRP 07/17/2024 7.73 (H)     Uric Acid 07/17/2024 6.6     WBC 07/17/2024 7.74     RBC 07/17/2024 4.36 (L)     Hemoglobin 07/17/2024 12.1 (L)     Hematocrit 07/17/2024 35.1 (L)     MCV 07/17/2024 80.5     MCH 07/17/2024 27.8     MCHC 07/17/2024 34.5     RDW 07/17/2024 15.4 (H)     Platelet Count 07/17/2024 217     MPV 07/17/2024 11.4     Neutrophils % 07/17/2024 " 65.4 (H)     Lymphocytes % 07/17/2024 22.6 (L)     Monocytes % 07/17/2024 9.9 (H)     Eosinophils % 07/17/2024 1.4     Basophils % 07/17/2024 0.4     Immature Granulocytes % 07/17/2024 0.3     nRBC, Auto 07/17/2024 0.0     Neutrophils, Abs 07/17/2024 5.06     Lymphocytes, Absolute 07/17/2024 1.75     Monocytes, Absolute 07/17/2024 0.77     Eosinophils, Absolute 07/17/2024 0.11     Basophils, Absolute 07/17/2024 0.03     Immature Granulocytes, A* 07/17/2024 0.02     nRBC, Absolute 07/17/2024 0.00     Diff Type 07/17/2024 Auto     WBC, UA 07/17/2024 5     RBC, UA 07/17/2024 <1     Squamous Epithelial Cell* 07/17/2024 Occasional (A)        Last Imaging:  X-Ray Lumbar Spine Ap And Lateral  Narrative: EXAMINATION:  XR LUMBAR SPINE AP AND LATERAL    CLINICAL HISTORY:  back pain;    TECHNIQUE:  AP, lateral and spot images were performed of the lumbar spine.    COMPARISON:  12/01/2021    FINDINGS:  No fracture or malalignment.  There is mild multilevel disc change.  Moderate calcification of the aorta is present.  Impression: Mild multilevel lumbar degenerative disc change.    Electronically signed by: Steven Sweeney MD  Date:    10/11/2024  Time:    10:18  X-Ray Thoracic Spine AP Lateral  Narrative: EXAMINATION:  XR THORACIC SPINE AP LATERAL    CLINICAL HISTORY:  back pain;    TECHNIQUE:  AP and lateral views of the thoracic spine were performed.    COMPARISON:  None    FINDINGS:  There has been a sternotomy.  No fracture or malalignment.  There is mild multilevel thoracic degenerative disc change.  Impression: Mild thoracic degenerative change.    Electronically signed by: Steven Sweeney MD  Date:    10/11/2024  Time:    10:17         **Labs and x-rays personally reviewed by me    ** reviewed           Assessment & Plan:       1. Tobacco dependency  Due to the many adverse health risks of smoking tobacco,  Patient has been encouraged to quit smoking, and smoking sensation treatments have been   treatments have  been offered and a  smoking cessation class has been recommended to the patient    2. Stage 3b chronic kidney disease  Keep close follow up with Nephrology rechecked big 8/renal function in February          Michael Weems MD

## 2024-11-30 ENCOUNTER — EXTERNAL CHRONIC CARE MANAGEMENT (OUTPATIENT)
Dept: FAMILY MEDICINE | Facility: CLINIC | Age: 74
End: 2024-11-30
Payer: MEDICARE

## 2024-11-30 PROCEDURE — G0511 CCM/BHI BY RHC/FQHC 20MIN MO: HCPCS | Mod: ,,, | Performed by: INTERNAL MEDICINE

## 2024-12-31 ENCOUNTER — EXTERNAL CHRONIC CARE MANAGEMENT (OUTPATIENT)
Dept: FAMILY MEDICINE | Facility: CLINIC | Age: 74
End: 2024-12-31
Payer: MEDICARE

## 2024-12-31 PROCEDURE — G0511 CCM/BHI BY RHC/FQHC 20MIN MO: HCPCS | Mod: ,,, | Performed by: INTERNAL MEDICINE

## 2025-01-31 ENCOUNTER — EXTERNAL CHRONIC CARE MANAGEMENT (OUTPATIENT)
Dept: FAMILY MEDICINE | Facility: CLINIC | Age: 75
End: 2025-01-31
Payer: MEDICARE

## 2025-01-31 PROCEDURE — G0511 CCM/BHI BY RHC/FQHC 20MIN MO: HCPCS | Mod: ,,, | Performed by: INTERNAL MEDICINE

## 2025-02-09 ENCOUNTER — HOSPITAL ENCOUNTER (EMERGENCY)
Facility: HOSPITAL | Age: 75
Discharge: HOME | End: 2025-02-09
Payer: MEDICARE

## 2025-02-09 VITALS
OXYGEN SATURATION: 97 % | HEART RATE: 72 BPM | TEMPERATURE: 99 F | BODY MASS INDEX: 27.3 KG/M2 | RESPIRATION RATE: 18 BRPM | DIASTOLIC BLOOD PRESSURE: 73 MMHG | SYSTOLIC BLOOD PRESSURE: 153 MMHG | WEIGHT: 195 LBS | HEIGHT: 71 IN

## 2025-02-09 DIAGNOSIS — G89.29 CHRONIC PAIN OF LEFT ANKLE: Primary | ICD-10-CM

## 2025-02-09 DIAGNOSIS — M25.572 CHRONIC PAIN OF LEFT ANKLE: Primary | ICD-10-CM

## 2025-02-09 PROCEDURE — 63600175 PHARM REV CODE 636 W HCPCS: Performed by: NURSE PRACTITIONER

## 2025-02-09 PROCEDURE — 99284 EMERGENCY DEPT VISIT MOD MDM: CPT | Mod: 25

## 2025-02-09 PROCEDURE — 96372 THER/PROPH/DIAG INJ SC/IM: CPT | Performed by: NURSE PRACTITIONER

## 2025-02-09 RX ORDER — DEXAMETHASONE SODIUM PHOSPHATE 4 MG/ML
4 INJECTION, SOLUTION INTRA-ARTICULAR; INTRALESIONAL; INTRAMUSCULAR; INTRAVENOUS; SOFT TISSUE
Status: COMPLETED | OUTPATIENT
Start: 2025-02-09 | End: 2025-02-09

## 2025-02-09 RX ADMIN — DEXAMETHASONE SODIUM PHOSPHATE 4 MG: 4 INJECTION, SOLUTION INTRA-ARTICULAR; INTRALESIONAL; INTRAMUSCULAR; INTRAVENOUS; SOFT TISSUE at 10:02

## 2025-02-09 NOTE — ED PROVIDER NOTES
Encounter Date: 2/9/2025       History     Chief Complaint   Patient presents with    Foot Pain     Presents to ED for complaints of pain and swelling to left foot.  Patient states he has been taking Tylenol arthritis with no relief.  Denies injury.     Pt presents with arthritis pain to left ankle per pt. He states he has had this pain from arthritis in the past and is requesting an injection only.         Review of patient's allergies indicates:  No Known Allergies  Past Medical History:   Diagnosis Date    Adenomatous polyp of ascending colon 11/29/2021    Adenomatous polyp of descending colon 11/29/2021    Adenomatous polyp of transverse colon 11/29/2021    Anticoagulant long-term use     Blood in stool 11/29/2021    CAD (coronary artery disease)     CHF (congestive heart failure)     COPD (chronic obstructive pulmonary disease)     SOB with walking distances 10/2022    GERD (gastroesophageal reflux disease)     Gout     HH (hiatus hernia) 05/16/2022    HTN (hypertension)     Hyperlipidemia     Iron deficiency anemia due to chronic blood loss 11/29/2021    follows with : Anemia, Monoclonal Gammopathy    Orchitis 01/20/2022    Osteoarthritis     Polyp of splenic flexure of colon 11/29/2021    Screening for colon cancer 11/29/2021    Urinary incontinence 01/20/2022    Vocal cord polyps     followed by  2022     Past Surgical History:   Procedure Laterality Date    CORONARY ARTERY BYPASS GRAFT (CABG)      DIRECT LARYNGOSCOPY Bilateral 05/10/2022    Procedure: LARYNGOSCOPY, DIRECT;  Surgeon: Jesse Smalls MD;  Location: [facility] OR;  Service: ENT;  Laterality: Bilateral;    DIRECT LARYNGOSCOPY N/A 11/11/2022    Procedure: LARYNGOSCOPY, DIRECT;  Surgeon: Jesse Smalls MD;  Location: [facility] OR;  Service: ENT;  Laterality: N/A;    DIRECT LARYNGOSCOPY N/A 8/15/2023    Procedure: LARYNGOSCOPY, DIRECT;  Surgeon: Jesse Smalls MD;  Location: [facility] OR;  Service: ENT;  Laterality:  N/A;    TONSILLECTOMY N/A     at age 18    VOCAL FOLD LESION EXCISION Bilateral 05/10/2022    Procedure: EXCISION, LESION, VOCAL CORD;  Surgeon: Jesse Smalls MD;  Location: [facility] OR;  Service: ENT;  Laterality: Bilateral;    VOCAL FOLD LESION EXCISION N/A 11/11/2022    Procedure: EXCISION, LESION, VOCAL CORD POLYP;  Surgeon: Jesse Smalls MD;  Location: [facility] OR;  Service: ENT;  Laterality: N/A;    VOCAL FOLD LESION EXCISION Right 8/15/2023    Procedure: EXCISION, LESION, VOCAL CORD;  Surgeon: Jesse Smalls MD;  Location: [facility] OR;  Service: ENT;  Laterality: Right;     Family History   Problem Relation Name Age of Onset    Hypertension Mother       Social History     Tobacco Use    Smoking status: Some Days     Current packs/day: 0.50     Average packs/day: 0.5 packs/day for 56.0 years (28.0 ttl pk-yrs)     Types: Cigarettes     Passive exposure: Current    Smokeless tobacco: Current    Tobacco comments:     Smokes 3 cigarettes daily   Substance Use Topics    Alcohol use: Yes     Alcohol/week: 2.0 standard drinks of alcohol     Types: 2 Cans of beer per week     Comment: occasionally    Drug use: Never     Review of Systems   Constitutional:  Negative for fever.   HENT:  Negative for sore throat.    Respiratory:  Negative for shortness of breath.    Cardiovascular:  Negative for chest pain.   Gastrointestinal:  Negative for nausea.   Genitourinary:  Negative for dysuria.   Musculoskeletal:  Negative for back pain.        Left ankle pain   Skin:  Negative for rash.   Neurological:  Negative for weakness.   Hematological:  Does not bruise/bleed easily.   Psychiatric/Behavioral:  Negative for behavioral problems.        Physical Exam     Initial Vitals [02/09/25 0943]   BP Pulse Resp Temp SpO2   (!) 153/73 72 18 99 °F (37.2 °C) 97 %      MAP       --         Physical Exam    Nursing note and vitals reviewed.  Constitutional: He appears well-developed.   Cardiovascular:  Normal rate and  regular rhythm.           Pulmonary/Chest: Breath sounds normal.   Musculoskeletal:         General: Normal range of motion.     Neurological: He is alert and oriented to person, place, and time.   Psychiatric: He has a normal mood and affect. Thought content normal.         Medical Screening Exam   See Full Note    ED Course   Procedures  Labs Reviewed - No data to display       Imaging Results    None          Medications   dexAMETHasone injection 4 mg (has no administration in time range)     Medical Decision Making  Pt presents with arthritis pain to left ankle per pt. He states he has had this pain from arthritis in the past and is requesting an injection only.       Risk  Prescription drug management.  Risk Details: Pt is discharged home in stable condition with diagnosis of left ankle pain to follow-up with PCP.                                       Clinical Impression:   Final diagnoses:  [M25.572, G89.29] Chronic pain of left ankle (Primary)        ED Disposition Condition    Discharge Stable          ED Prescriptions    None       Follow-up Information    None          Karrie Marin, SARA  02/09/25 1000

## 2025-02-14 ENCOUNTER — OFFICE VISIT (OUTPATIENT)
Dept: FAMILY MEDICINE | Facility: CLINIC | Age: 75
End: 2025-02-14
Payer: MEDICARE

## 2025-02-14 VITALS
TEMPERATURE: 98 F | DIASTOLIC BLOOD PRESSURE: 70 MMHG | HEART RATE: 63 BPM | OXYGEN SATURATION: 98 % | WEIGHT: 202 LBS | BODY MASS INDEX: 28.28 KG/M2 | SYSTOLIC BLOOD PRESSURE: 140 MMHG | HEIGHT: 71 IN

## 2025-02-14 DIAGNOSIS — M10.9 GOUT INVOLVING TOE OF LEFT FOOT, UNSPECIFIED CAUSE, UNSPECIFIED CHRONICITY: Primary | ICD-10-CM

## 2025-02-14 RX ORDER — PREDNISONE 10 MG/1
10 TABLET ORAL DAILY
Qty: 5 TABLET | Refills: 0 | Status: SHIPPED | OUTPATIENT
Start: 2025-02-14

## 2025-02-14 RX ORDER — ALLOPURINOL 100 MG/1
100 TABLET ORAL DAILY
Qty: 90 TABLET | Refills: 1 | Status: SHIPPED | OUTPATIENT
Start: 2025-02-14

## 2025-02-14 RX ORDER — COLCHICINE 0.6 MG/1
TABLET ORAL
Qty: 4 TABLET | Refills: 0 | Status: SHIPPED | OUTPATIENT
Start: 2025-02-14

## 2025-02-14 RX ORDER — DEXAMETHASONE SODIUM PHOSPHATE 4 MG/ML
4 INJECTION, SOLUTION INTRA-ARTICULAR; INTRALESIONAL; INTRAMUSCULAR; INTRAVENOUS; SOFT TISSUE
Status: COMPLETED | OUTPATIENT
Start: 2025-02-14 | End: 2025-02-14

## 2025-02-14 RX ORDER — METHYLPREDNISOLONE ACETATE 40 MG/ML
40 INJECTION, SUSPENSION INTRA-ARTICULAR; INTRALESIONAL; INTRAMUSCULAR; SOFT TISSUE ONCE
Status: COMPLETED | OUTPATIENT
Start: 2025-02-14 | End: 2025-02-14

## 2025-02-14 RX ADMIN — METHYLPREDNISOLONE ACETATE 40 MG: 40 INJECTION, SUSPENSION INTRA-ARTICULAR; INTRALESIONAL; INTRAMUSCULAR; SOFT TISSUE at 09:02

## 2025-02-14 RX ADMIN — DEXAMETHASONE SODIUM PHOSPHATE 4 MG: 4 INJECTION, SOLUTION INTRA-ARTICULAR; INTRALESIONAL; INTRAMUSCULAR; INTRAVENOUS; SOFT TISSUE at 09:02

## 2025-02-14 NOTE — PROGRESS NOTES
"Subjective     Patient ID: Sami Kerns is a 75 y.o. male.    Chief Complaint: Gout (Exam room 3)    History of Present Illness    CHIEF COMPLAINT:  Patient presents today for follow-up of gout flare.    HISTORY OF PRESENT ILLNESS:  He visited emergency room last Sunday for a gout flare where he received an injection with minimal pain improvement. He reports current pain in corner toe and disturbed sleep due to pain. He takes Tylenol for arthritis but finds it ineffective for pain relief and states he cannot take Aleve.    MEDICAL HISTORY:  He has a history of severe gout flare affecting multiple areas including back and spine. X-ray showed widespread gout throughout his body. He received treatment with bilateral hip injections which effectively resolved the previous flare.    MEDICATIONS:  He previously used allopurinol prescribed by Dr. Weems but has not received or picked up a recent prescription.    DIET:  He reports recent consumption of deer sausage and raccoon meat.            Review of Systems      Vital Signs  Temp: 97.6 °F (36.4 °C)  Temp Source: Oral  Pulse: 63  SpO2: 98 %  BP: (!) 140/70  BP Location: Right arm  Patient Position: Sitting  Height and Weight  Height: 5' 11" (180.3 cm)  Weight: 91.6 kg (202 lb)  BSA (Calculated - sq m): 2.14 sq meters  BMI (Calculated): 28.2  Weight in (lb) to have BMI = 25: 178.9]    Physical Exam  Vitals and nursing note reviewed.   Constitutional:       Appearance: Normal appearance. He is normal weight.   Cardiovascular:      Rate and Rhythm: Normal rate and regular rhythm.      Pulses: Normal pulses.      Heart sounds: Normal heart sounds.   Pulmonary:      Effort: Pulmonary effort is normal.      Breath sounds: Normal breath sounds.   Abdominal:      General: Abdomen is flat. Bowel sounds are normal.      Palpations: Abdomen is soft.   Musculoskeletal:         General: Tenderness (pain, swelling, mild erythema at base of left great toe) present. Normal range of " motion.      Cervical back: Normal range of motion and neck supple.   Skin:     General: Skin is warm and dry.      Capillary Refill: Capillary refill takes less than 2 seconds.   Neurological:      General: No focal deficit present.      Mental Status: He is alert and oriented to person, place, and time.   Psychiatric:         Mood and Affect: Mood normal.         Behavior: Behavior normal.         Thought Content: Thought content normal.         Judgment: Judgment normal.            Assessment and Plan     1. Gout involving toe of left foot, unspecified cause, unspecified chronicity  -     colchicine (COLCRYS) 0.6 mg tablet; Take one tablet by mouth and then repeat dose in 1 hour.  Dispense: 4 tablet; Refill: 0  -     dexAMETHasone injection 4 mg  -     methylPREDNISolone acetate injection 40 mg  -     predniSONE (DELTASONE) 10 MG tablet; Take 1 tablet (10 mg total) by mouth once daily.  Dispense: 5 tablet; Refill: 0    Other orders  -     allopurinoL (ZYLOPRIM) 100 MG tablet; Take 1 tablet (100 mg total) by mouth once daily.  Dispense: 90 tablet; Refill: 1      Assessment & Plan    IMPRESSION:  - Assessed gout flare-up, considering recent ER visit and ineffectiveness of Tylenol  - Determined need for anti-inflammatory treatment, ruling out Aleve due to contraindications  - Decided on combination therapy: colchicine for acute management, steroid for inflammation, and allopurinol for long-term uric acid control  - Planned to administer combination injection in-office for immediate relief    GOUT:  - Evaluated the patient's foot and acknowledged gout diagnosis.  - Discussed dietary triggers for gout with the patient.  - Educated the patient on gout management and differences between various anti-inflammatory medications and their effects on gout.  - Prescribed colchicine for acute gout attack: 1 tablet initially, followed by another tablet in 1 hour if symptoms persist, with a maximum of 4 tablets.  - Administered a  combination injection of 2 medications in-office.  - Prescribed a short course of steroids.  - Continued allopurinol for long-term gout management.  - Educated on importance of dietary modifications for gout management.  - Discussed benefits of lean meats like turkey as alternatives to red meat for gout management.  - Patient to reduce consumption of red meat and high-purine foods.  - Recommend substituting ground turkey for ground beef in recipes.  - Patient to incorporate more salads into diet.    ARTHRITIS:  - Noted that the patient is taking acetaminophen for arthritis pain.  - Discussed anti-inflammatory medications for arthritis management.  - Noted the patient's inability to take naproxen.  - Educated the patient on the importance of dietary modifications and differences between various anti-inflammatory medications.    FOLLOW UP:  - Instructed the patient to follow up with Dr. Weems.                  Follow up if symptoms worsen or fail to improve.    This note was generated with the assistance of ambient listening technology. Verbal consent was obtained by the patient and accompanying visitor(s) for the recording of patient appointment to facilitate this note. I attest to having reviewed and edited the generated note for accuracy, though some syntax or spelling errors may persist. Please contact the author of this note for any clarification.         I spent a total of 15 minutes on the day of the visit.This includes face to face time and non-face to face time preparing to see the patient (eg, review of tests), obtaining and/or reviewing separately obtained history, documenting clinical information in the electronic or other health record, independently interpreting results and communicating results to the patient/family/caregiver, or care coordinator.    BETINA Olvera

## 2025-02-21 ENCOUNTER — OFFICE VISIT (OUTPATIENT)
Dept: GASTROENTEROLOGY | Facility: CLINIC | Age: 75
End: 2025-02-21
Payer: MEDICARE

## 2025-02-21 VITALS
HEIGHT: 71 IN | HEART RATE: 61 BPM | BODY MASS INDEX: 28.65 KG/M2 | SYSTOLIC BLOOD PRESSURE: 129 MMHG | OXYGEN SATURATION: 100 % | WEIGHT: 204.63 LBS | DIASTOLIC BLOOD PRESSURE: 52 MMHG

## 2025-02-21 DIAGNOSIS — D12.2 ADENOMATOUS POLYP OF ASCENDING COLON: ICD-10-CM

## 2025-02-21 DIAGNOSIS — D47.2 MONOCLONAL GAMMOPATHY: ICD-10-CM

## 2025-02-21 DIAGNOSIS — D12.3 ADENOMATOUS POLYP OF TRANSVERSE COLON: ICD-10-CM

## 2025-02-21 DIAGNOSIS — D50.0 IRON DEFICIENCY ANEMIA DUE TO CHRONIC BLOOD LOSS: ICD-10-CM

## 2025-02-21 DIAGNOSIS — D12.4 ADENOMATOUS POLYP OF DESCENDING COLON: Primary | ICD-10-CM

## 2025-02-21 PROCEDURE — 99215 OFFICE O/P EST HI 40 MIN: CPT | Mod: PBBFAC | Performed by: NURSE PRACTITIONER

## 2025-02-21 NOTE — PROGRESS NOTES
Sami Kenrs is a 75 y.o. male here for Follow-up        PCP: Michael Weems  Referring Provider: No referring provider defined for this encounter.     HPI:  Presents for follow up with [facility]. Patient has monoclonal gammopathy and is following with Dr. Arenas.  States that he did have a bone marrow biopsy performed at [facility].  Next follow up with Dr. Arenas is in June.  Reports that he is no longer taking iron.  He has also required dilation for esophageal dysphagia.  Last EGD dilation was 05/16/2024, 2 cm hiatal hernia.  Patient denies dysphagia.  Last colonoscopy was 11/29/2021, report reviewed, tubular adenoma removed from the ascending colon, descending colon, and also the splenic flexure.  Recommendation was to repeat colonoscopy in 3 years.  No report of constipation.  No hematochezia or melena.  No unintentional weight loss.  Patient is currently due for colonoscopy.  He is taking Plavix.  We will send cardiac clearance to Dr. Melchor.  Colonoscopy prep and procedure reviewed with the patient.  Patient agrees to schedule.    Follow-up  Pertinent negatives include no abdominal pain, change in bowel habit, chest pain, fatigue, fever, nausea or vomiting.         ROS:  Review of Systems   Constitutional:  Negative for activity change, appetite change, fatigue, fever and unexpected weight change.   HENT:  Negative for trouble swallowing.    Cardiovascular:  Negative for chest pain.   Gastrointestinal:  Negative for abdominal distention, abdominal pain, anal bleeding, blood in stool, change in bowel habit, constipation, diarrhea, nausea, vomiting and reflux.   Musculoskeletal:  Negative for gait problem.   Integumentary:  Negative for color change.   Psychiatric/Behavioral:  Negative for sleep disturbance. The patient is not nervous/anxious.           PMHX:  has a past medical history of Adenomatous polyp of ascending colon (11/29/2021), Adenomatous polyp of descending colon (11/29/2021), Adenomatous  polyp of transverse colon (11/29/2021), Anticoagulant long-term use, Blood in stool (11/29/2021), CAD (coronary artery disease), CHF (congestive heart failure), COPD (chronic obstructive pulmonary disease), GERD (gastroesophageal reflux disease), Gout, HH (hiatus hernia) (05/16/2022), HTN (hypertension), Hyperlipidemia, Iron deficiency anemia due to chronic blood loss (11/29/2021), Orchitis (01/20/2022), Osteoarthritis, Polyp of splenic flexure of colon (11/29/2021), Screening for colon cancer (11/29/2021), Urinary incontinence (01/20/2022), and Vocal cord polyps.    PSHX:  has a past surgical history that includes Coronary Artery Bypass Graft (CABG); Tonsillectomy (N/A); Direct laryngoscopy (Bilateral, 05/10/2022); Vocal fold lesion excision (Bilateral, 05/10/2022); Direct laryngoscopy (N/A, 11/11/2022); Vocal fold lesion excision (N/A, 11/11/2022); Direct laryngoscopy (N/A, 8/15/2023); and Vocal fold lesion excision (Right, 8/15/2023).    PFHX: family history includes Hypertension in his mother.    PSlHX:  reports that he has been smoking cigarettes. He has a 28 pack-year smoking history. He has been exposed to tobacco smoke. He uses smokeless tobacco. He reports current alcohol use of about 2.0 standard drinks of alcohol per week. He reports that he does not use drugs.        Review of patient's allergies indicates:  No Known Allergies    Medication List with Changes/Refills   Current Medications    ALBUTEROL (PROVENTIL/VENTOLIN HFA) 90 MCG/ACTUATION INHALER    INHALE 2 PUFFS BY MOUTH EVERY 6 HOURS AS NEEDED FOR WHEEZING    ALLOPURINOL (ZYLOPRIM) 100 MG TABLET    Take 1 tablet (100 mg total) by mouth once daily.    AMIODARONE (PACERONE) 200 MG TAB    Take by mouth once daily.    ASCORBIC ACID, VITAMIN C, (VITAMIN C) 1000 MG TABLET    Take 1,000 mg by mouth once daily.    ASPIRIN (ECOTRIN) 81 MG EC TABLET    Take 81 mg by mouth once daily.    ATORVASTATIN (LIPITOR) 80 MG TABLET    Take 1 tablet by mouth once  "daily.    CILOSTAZOL (PLETAL) 100 MG TAB    Take 100 mg by mouth 2 (two) times daily.    CLOPIDOGREL (PLAVIX) 75 MG TABLET    Take 1 tablet (75 mg total) by mouth once daily.    COLCHICINE (COLCRYS) 0.6 MG TABLET    Take one tablet by mouth and then repeat dose in 1 hour.    DICLOFENAC (VOLTAREN) 75 MG EC TABLET    Take 1 tablet (75 mg total) by mouth 2 (two) times daily.    EZETIMIBE (ZETIA) 10 MG TABLET    Take 10 mg by mouth once daily.    FUROSEMIDE (LASIX) 40 MG TABLET    Take 40 mg by mouth once daily.    HYDRALAZINE (APRESOLINE) 50 MG TABLET    Take 1 tablet (50 mg total) by mouth 3 (three) times daily.    INDOMETHACIN (INDOCIN) 25 MG CAPSULE    Take 1 capsule (25 mg total) by mouth 2 (two) times daily as needed (for pain).    ISOSORBIDE MONONITRATE (IMDUR) 30 MG 24 HR TABLET    Take 15 mg by mouth.    LATANOPROST 0.005 % OPHTHALMIC SOLUTION    Place 1 drop into both eyes every evening.    LOSARTAN (COZAAR) 50 MG TABLET    Take 2 tablets (100 mg total) by mouth once daily.    METOPROLOL SUCCINATE (TOPROL-XL) 50 MG 24 HR TABLET    Take 1 tablet by mouth once daily.    NAPROXEN (NAPROSYN) 500 MG TABLET    TAKE ONE TABLET BY MOUTH TWICE DAILY AS NEEDED    NITROGLYCERIN (NITROSTAT) 0.4 MG SL TABLET    Place under the tongue.    PREDNISONE (DELTASONE) 10 MG TABLET    Take 1 tablet (10 mg total) by mouth once daily.    TAMSULOSIN (FLOMAX) 0.4 MG CAP    TAKE ONE CAPSULE BY MOUTH EVERY DAY    TIMOLOL MALEATE 0.5% (TIMOPTIC) 0.5 % DROP    Place into both eyes.    TRAMADOL (ULTRAM) 50 MG TABLET    Take 1 tablet (50 mg total) by mouth every 6 (six) hours as needed for Pain.    VALSARTAN (DIOVAN) 320 MG TABLET    Take 320 mg by mouth.        Objective Findings:  Vital Signs:  BP (!) 129/52   Pulse 61   Ht 5' 11" (1.803 m)   Wt 92.8 kg (204 lb 9.6 oz)   SpO2 100%   BMI 28.54 kg/m²  Body mass index is 28.54 kg/m².    Physical Exam:  Physical Exam  Vitals and nursing note reviewed.   Constitutional:       General: He " "is not in acute distress.     Appearance: Normal appearance.   HENT:      Mouth/Throat:      Mouth: Mucous membranes are moist.   Cardiovascular:      Rate and Rhythm: Normal rate.   Pulmonary:      Effort: Pulmonary effort is normal.   Abdominal:      General: Bowel sounds are normal. There is no distension.      Palpations: Abdomen is soft. There is no mass.      Tenderness: There is no abdominal tenderness.   Skin:     General: Skin is warm and dry.      Coloration: Skin is not jaundiced or pale.   Neurological:      Mental Status: He is alert and oriented to person, place, and time.   Psychiatric:         Mood and Affect: Mood normal.          Labs:  Lab Results   Component Value Date    WBC 7.74 07/17/2024    HGB 12.1 (L) 07/17/2024    HCT 35.1 (L) 07/17/2024    MCV 80.5 07/17/2024    RDW 15.4 (H) 07/17/2024     07/17/2024    LYMPH 22.6 (L) 07/17/2024    LYMPH 1.75 07/17/2024    MONO 9.9 (H) 07/17/2024    EOS 0.11 07/17/2024    BASO 0.03 07/17/2024     Lab Results   Component Value Date     (L) 07/17/2024    K 4.8 07/17/2024     07/17/2024    CO2 21 07/17/2024     (H) 07/17/2024    BUN 29 (H) 07/17/2024    CREATININE 1.98 (H) 07/17/2024    CALCIUM 9.2 07/17/2024    PROT 8.3 (H) 07/17/2024    ALBUMIN 3.6 07/17/2024    BILITOT 0.3 07/17/2024    ALKPHOS 89 07/17/2024    AST 17 07/17/2024    ALT 21 07/17/2024         Imaging: No results found.      Assessment:  Sami Kerns is a 75 y.o. male here with:  1. Adenomatous polyp of descending colon    2. Adenomatous polyp of transverse colon    3. Adenomatous polyp of ascending colon    4. Monoclonal gammopathy    5. Iron deficiency anemia due to chronic blood loss          Recommendations:  1. Colonoscopy  2. Send cardiac clearance to Dr[facility] Melchor at [facility]. Patient is on Plavix  3. Follow up with Dr. Arenas as scheduled    Portions of this note may have been created with voice recognition software.  Occasional wrong word or "sound a like " substitutions may have occurred due to inherent limitations of voice recognition software.  Please read the note carefully and recognize using contexts, where substitutions have occurred.    Diagnosis, risks, benefits, and side effects of any medications and treatment plan were discussed with the patient.  All questions were answered to the satisfaction of the patient, and patient verbalized understanding and agreement to the treatment plan.        Follow up in about 1 year (around 2/21/2026).      Order summary:  Orders Placed This Encounter    Colonoscopy       Thank you for allowing me to participate in the care of Samitravis Kerns.      BETINA Su

## 2025-02-26 ENCOUNTER — OFFICE VISIT (OUTPATIENT)
Dept: FAMILY MEDICINE | Facility: CLINIC | Age: 75
End: 2025-02-26
Payer: MEDICARE

## 2025-02-26 VITALS
WEIGHT: 202 LBS | HEART RATE: 68 BPM | DIASTOLIC BLOOD PRESSURE: 72 MMHG | HEIGHT: 71 IN | OXYGEN SATURATION: 98 % | RESPIRATION RATE: 18 BRPM | TEMPERATURE: 98 F | BODY MASS INDEX: 28.28 KG/M2 | SYSTOLIC BLOOD PRESSURE: 158 MMHG

## 2025-02-26 DIAGNOSIS — K64.9 HEMORRHOIDS, UNSPECIFIED HEMORRHOID TYPE: Primary | ICD-10-CM

## 2025-02-26 PROCEDURE — 4010F ACE/ARB THERAPY RXD/TAKEN: CPT | Mod: ,,, | Performed by: INTERNAL MEDICINE

## 2025-02-26 PROCEDURE — 1125F AMNT PAIN NOTED PAIN PRSNT: CPT | Mod: ,,, | Performed by: INTERNAL MEDICINE

## 2025-02-26 PROCEDURE — 1159F MED LIST DOCD IN RCRD: CPT | Mod: ,,, | Performed by: INTERNAL MEDICINE

## 2025-02-26 PROCEDURE — 3008F BODY MASS INDEX DOCD: CPT | Mod: ,,, | Performed by: INTERNAL MEDICINE

## 2025-02-26 PROCEDURE — 3077F SYST BP >= 140 MM HG: CPT | Mod: ,,, | Performed by: INTERNAL MEDICINE

## 2025-02-26 PROCEDURE — 99212 OFFICE O/P EST SF 10 MIN: CPT | Mod: ,,, | Performed by: INTERNAL MEDICINE

## 2025-02-26 PROCEDURE — 3078F DIAST BP <80 MM HG: CPT | Mod: ,,, | Performed by: INTERNAL MEDICINE

## 2025-02-26 NOTE — PROGRESS NOTES
"Subjective:       Patient ID: Sami Kerns is a 75 y.o. male.    Chief Complaint: Hemorrhoids    History of Present Illness    CHIEF COMPLAINT:  Patient presents today for follow up    MUSCULOSKELETAL:  He uses a mobility aid to assist with ambulation as needed. Bone survey X-ray showed no fractures with mild arthritis. Lower back X-ray demonstrated mild arthritis.    GASTROINTESTINAL:  He has bowel movements once to twice daily and uses Preparation H for hemorrhoid management.         Current Medications:  Current Medications[1]           ROS  Twelve point system reviewed, unremarkable except for stated above in HPI.        Objective:         Vitals:    02/26/25 0953 02/26/25 1015   BP: (!) 157/68 (!) 158/72   BP Location: Left arm    Patient Position: Sitting    Pulse: 68    Resp: 18    Temp: 97.5 °F (36.4 °C)    TempSrc: Temporal    SpO2: 98%    Weight: 91.6 kg (202 lb)    Height: 5' 11" (1.803 m)         Physical Exam     Patient is awake alert oriented person place and  Lungs are clear to auscultation bilaterally no crackles or wheezes   Cardiovascular S1-S2 regular rate and rhythm no murmurs rubs or gallops   Abdomen is soft positive bowel sounds nontender, extremities no clubbing cyanosis edema  Neuro no focal neurological deficits  Skin warm and dry.     Last Labs:     No visits with results within 1 Month(s) from this visit.   Latest known visit with results is:   Admission on 07/17/2024, Discharged on 07/17/2024   Component Date Value    Color, UA 07/17/2024 Colorless     Clarity, UA 07/17/2024 Clear     pH, UA 07/17/2024 5.5     Leukocytes, UA 07/17/2024 Small (A)     Nitrites, UA 07/17/2024 Negative     Protein, UA 07/17/2024 20 (A)     Glucose, UA 07/17/2024 Normal     Ketones, UA 07/17/2024 Negative     Urobilinogen, UA 07/17/2024 Normal     Bilirubin, UA 07/17/2024 Negative     Blood, UA 07/17/2024 Negative     Specific Gravity, UA 07/17/2024 1.010     Sodium 07/17/2024 131 (L)     Potassium " 07/17/2024 4.8     Chloride 07/17/2024 104     CO2 07/17/2024 21     Anion Gap 07/17/2024 11     Glucose 07/17/2024 108 (H)     BUN 07/17/2024 29 (H)     Creatinine 07/17/2024 1.98 (H)     BUN/Creatinine Ratio 07/17/2024 15     Calcium 07/17/2024 9.2     Total Protein 07/17/2024 8.3 (H)     Albumin 07/17/2024 3.6     Globulin 07/17/2024 4.7 (H)     A/G Ratio 07/17/2024 0.8     Bilirubin, Total 07/17/2024 0.3     Alk Phos 07/17/2024 89     ALT 07/17/2024 21     AST 07/17/2024 17     eGFR 07/17/2024 35 (L)     Magnesium 07/17/2024 2.4 (H)     CRP 07/17/2024 7.73 (H)     Uric Acid 07/17/2024 6.6     WBC 07/17/2024 7.74     RBC 07/17/2024 4.36 (L)     Hemoglobin 07/17/2024 12.1 (L)     Hematocrit 07/17/2024 35.1 (L)     MCV 07/17/2024 80.5     MCH 07/17/2024 27.8     MCHC 07/17/2024 34.5     RDW 07/17/2024 15.4 (H)     Platelet Count 07/17/2024 217     MPV 07/17/2024 11.4     Neutrophils % 07/17/2024 65.4 (H)     Lymphocytes % 07/17/2024 22.6 (L)     Monocytes % 07/17/2024 9.9 (H)     Eosinophils % 07/17/2024 1.4     Basophils % 07/17/2024 0.4     Immature Granulocytes % 07/17/2024 0.3     nRBC, Auto 07/17/2024 0.0     Neutrophils, Abs 07/17/2024 5.06     Lymphocytes, Absolute 07/17/2024 1.75     Monocytes, Absolute 07/17/2024 0.77     Eosinophils, Absolute 07/17/2024 0.11     Basophils, Absolute 07/17/2024 0.03     Immature Granulocytes, A* 07/17/2024 0.02     nRBC, Absolute 07/17/2024 0.00     Diff Type 07/17/2024 Auto     WBC, UA 07/17/2024 5     RBC, UA 07/17/2024 <1     Squamous Epithelial Cell* 07/17/2024 Occasional (A)        Last Imaging:  X-Ray Lumbar Spine Ap And Lateral  Narrative: EXAMINATION:  XR LUMBAR SPINE AP AND LATERAL    CLINICAL HISTORY:  back pain;    TECHNIQUE:  AP, lateral and spot images were performed of the lumbar spine.    COMPARISON:  12/01/2021    FINDINGS:  No fracture or malalignment.  There is mild multilevel disc change.  Moderate calcification of the aorta is present.  Impression: Mild  multilevel lumbar degenerative disc change.    Electronically signed by: Steven Sweeney MD  Date:    10/11/2024  Time:    10:18  X-Ray Thoracic Spine AP Lateral  Narrative: EXAMINATION:  XR THORACIC SPINE AP LATERAL    CLINICAL HISTORY:  back pain;    TECHNIQUE:  AP and lateral views of the thoracic spine were performed.    COMPARISON:  None    FINDINGS:  There has been a sternotomy.  No fracture or malalignment.  There is mild multilevel thoracic degenerative disc change.  Impression: Mild thoracic degenerative change.    Electronically signed by: Steven Sweeney MD  Date:    10/11/2024  Time:    10:17         **Labs and x-rays personally reviewed by me    ** reviewed           Assessment & Plan:   Assessment & Plan    IMPRESSION:  - Assessed patient's hemorrhoid condition and overall health status  - Reviewed recent bone X-ray and lower back X-ray results, noting minor arthritis  - Performed physical exam, including respiratory assessment    HEMORRHOIDS:  - Initiated treatment with Preparation H for hemorrhoids.  - Referred the patient to a gastroenterologist for further evaluation and treatment of hemorrhoids.  - Recommend soaking in warm salt water for 20 minutes daily.  - Advised the patient to monitor intake of spicy foods.  - Inquired about the patient's bowel movements and constipation.  - Emphasized the importance of avoiding spicy foods for hemorrhoid management.  - Patient to soak in warm salt water bath for 20 minutes daily to manage hemorrhoids.    ARTHRITIS:  - Performed a physical exam of the patient's back.  - Completed a lower back X-ray, which revealed minor arthritis.  - Acknowledged the patient's arthritis diagnosis.  - Noted that the patient uses a mobility device to improve ambulation.    OTHER INSTRUCTIONS:  - Additional information not specific to any ICD-10 code: - Bone X-ray completed, showing no fractures. - Blood work completed, results reviewed.    FOLLOW UP:  - Follow up in  July.           1. Hemorrhoids, unspecified hemorrhoid type      Gustavo zee, preparation H, will follow up with GERD for colonoscopy in the near future      Michael Weems MD  This note was generated with the assistance of ambient listening technology. Verbal consent was obtained by the patient and accompanying visitor(s) for the recording of patient appointment to facilitate this note. I attest to having reviewed and edited the generated note for accuracy, though some syntax or spelling errors may persist. Please contact the author of this note for any clarification.            [1]   Current Outpatient Medications:     albuterol (PROVENTIL/VENTOLIN HFA) 90 mcg/actuation inhaler, INHALE 2 PUFFS BY MOUTH EVERY 6 HOURS AS NEEDED FOR WHEEZING, Disp: 18 g, Rfl: 5    allopurinoL (ZYLOPRIM) 100 MG tablet, Take 1 tablet (100 mg total) by mouth once daily., Disp: 90 tablet, Rfl: 1    amiodarone (PACERONE) 200 MG Tab, Take by mouth once daily., Disp: , Rfl:     ascorbic acid, vitamin C, (VITAMIN C) 1000 MG tablet, Take 1,000 mg by mouth once daily., Disp: , Rfl:     aspirin (ECOTRIN) 81 MG EC tablet, Take 81 mg by mouth once daily., Disp: , Rfl:     atorvastatin (LIPITOR) 80 MG tablet, Take 1 tablet by mouth once daily., Disp: , Rfl:     cilostazoL (PLETAL) 100 MG Tab, Take 100 mg by mouth 2 (two) times daily., Disp: , Rfl:     clopidogreL (PLAVIX) 75 mg tablet, Take 1 tablet (75 mg total) by mouth once daily., Disp: 90 tablet, Rfl: 1    colchicine (COLCRYS) 0.6 mg tablet, Take one tablet by mouth and then repeat dose in 1 hour., Disp: 4 tablet, Rfl: 0    diclofenac (VOLTAREN) 75 MG EC tablet, Take 1 tablet (75 mg total) by mouth 2 (two) times daily., Disp: 30 tablet, Rfl: 0    ezetimibe (ZETIA) 10 mg tablet, Take 10 mg by mouth once daily., Disp: , Rfl:     furosemide (LASIX) 40 MG tablet, Take 40 mg by mouth once daily., Disp: , Rfl:     hydrALAZINE (APRESOLINE) 50 MG tablet, Take 1 tablet (50 mg total) by mouth 3  (three) times daily., Disp: 90 tablet, Rfl: 1    indomethacin (INDOCIN) 25 MG capsule, Take 1 capsule (25 mg total) by mouth 2 (two) times daily as needed (for pain)., Disp: 9 capsule, Rfl: 3    isosorbide mononitrate (IMDUR) 30 MG 24 hr tablet, Take 15 mg by mouth., Disp: , Rfl:     latanoprost 0.005 % ophthalmic solution, Place 1 drop into both eyes every evening., Disp: , Rfl:     losartan (COZAAR) 50 MG tablet, Take 2 tablets (100 mg total) by mouth once daily., Disp: 90 tablet, Rfl: 1    metoprolol succinate (TOPROL-XL) 50 MG 24 hr tablet, Take 1 tablet by mouth once daily., Disp: , Rfl:     naproxen (NAPROSYN) 500 MG tablet, TAKE ONE TABLET BY MOUTH TWICE DAILY AS NEEDED, Disp: 20 tablet, Rfl: 4    nitroGLYCERIN (NITROSTAT) 0.4 MG SL tablet, Place under the tongue., Disp: , Rfl:     predniSONE (DELTASONE) 10 MG tablet, Take 1 tablet (10 mg total) by mouth once daily., Disp: 5 tablet, Rfl: 0    tamsulosin (FLOMAX) 0.4 mg Cap, TAKE ONE CAPSULE BY MOUTH EVERY DAY, Disp: 90 capsule, Rfl: 3    timolol maleate 0.5% (TIMOPTIC) 0.5 % Drop, Place into both eyes., Disp: , Rfl:     traMADoL (ULTRAM) 50 mg tablet, Take 1 tablet (50 mg total) by mouth every 6 (six) hours as needed for Pain., Disp: 12 tablet, Rfl: 0    valsartan (DIOVAN) 320 MG tablet, Take 320 mg by mouth., Disp: , Rfl:   No current facility-administered medications for this visit.    Facility-Administered Medications Ordered in Other Visits:     0.9%  NaCl infusion, , Intravenous, Continuous, Jesse Smalls MD, Last Rate: 50 mL/hr at 08/15/23 0714, New Bag at 08/15/23 0714

## 2025-02-28 ENCOUNTER — EXTERNAL CHRONIC CARE MANAGEMENT (OUTPATIENT)
Dept: FAMILY MEDICINE | Facility: CLINIC | Age: 75
End: 2025-02-28
Payer: MEDICARE

## 2025-02-28 PROCEDURE — G0511 CCM/BHI BY RHC/FQHC 20MIN MO: HCPCS | Mod: ,,, | Performed by: INTERNAL MEDICINE

## 2025-03-11 ENCOUNTER — OFFICE VISIT (OUTPATIENT)
Dept: FAMILY MEDICINE | Facility: CLINIC | Age: 75
End: 2025-03-11
Payer: MEDICARE

## 2025-03-11 VITALS
HEART RATE: 53 BPM | TEMPERATURE: 98 F | SYSTOLIC BLOOD PRESSURE: 149 MMHG | OXYGEN SATURATION: 98 % | DIASTOLIC BLOOD PRESSURE: 64 MMHG

## 2025-03-11 DIAGNOSIS — M10.9 ACUTE GOUT OF RIGHT FOOT, UNSPECIFIED CAUSE: Primary | ICD-10-CM

## 2025-03-11 LAB — URATE SERPL-MCNC: 7 MG/DL (ref 3.5–7.2)

## 2025-03-11 PROCEDURE — 1125F AMNT PAIN NOTED PAIN PRSNT: CPT | Mod: ,,, | Performed by: NURSE PRACTITIONER

## 2025-03-11 PROCEDURE — 4010F ACE/ARB THERAPY RXD/TAKEN: CPT | Mod: ,,, | Performed by: NURSE PRACTITIONER

## 2025-03-11 PROCEDURE — 3077F SYST BP >= 140 MM HG: CPT | Mod: ,,, | Performed by: NURSE PRACTITIONER

## 2025-03-11 PROCEDURE — 1159F MED LIST DOCD IN RCRD: CPT | Mod: ,,, | Performed by: NURSE PRACTITIONER

## 2025-03-11 PROCEDURE — 99213 OFFICE O/P EST LOW 20 MIN: CPT | Mod: 25,,, | Performed by: NURSE PRACTITIONER

## 2025-03-11 PROCEDURE — 96372 THER/PROPH/DIAG INJ SC/IM: CPT | Mod: ,,, | Performed by: NURSE PRACTITIONER

## 2025-03-11 PROCEDURE — 3078F DIAST BP <80 MM HG: CPT | Mod: ,,, | Performed by: NURSE PRACTITIONER

## 2025-03-11 PROCEDURE — 84550 ASSAY OF BLOOD/URIC ACID: CPT | Mod: ,,, | Performed by: CLINICAL MEDICAL LABORATORY

## 2025-03-11 PROCEDURE — 1160F RVW MEDS BY RX/DR IN RCRD: CPT | Mod: ,,, | Performed by: NURSE PRACTITIONER

## 2025-03-11 RX ORDER — DEXAMETHASONE SODIUM PHOSPHATE 4 MG/ML
4 INJECTION, SOLUTION INTRA-ARTICULAR; INTRALESIONAL; INTRAMUSCULAR; INTRAVENOUS; SOFT TISSUE
Status: COMPLETED | OUTPATIENT
Start: 2025-03-11 | End: 2025-03-11

## 2025-03-11 RX ORDER — METHYLPREDNISOLONE ACETATE 40 MG/ML
40 INJECTION, SUSPENSION INTRA-ARTICULAR; INTRALESIONAL; INTRAMUSCULAR; SOFT TISSUE
Status: COMPLETED | OUTPATIENT
Start: 2025-03-11 | End: 2025-03-11

## 2025-03-11 RX ORDER — INDOMETHACIN 25 MG/1
25 CAPSULE ORAL 2 TIMES DAILY PRN
Qty: 20 CAPSULE | Refills: 0 | Status: SHIPPED | OUTPATIENT
Start: 2025-03-11

## 2025-03-11 RX ADMIN — METHYLPREDNISOLONE ACETATE 40 MG: 40 INJECTION, SUSPENSION INTRA-ARTICULAR; INTRALESIONAL; INTRAMUSCULAR; SOFT TISSUE at 09:03

## 2025-03-11 RX ADMIN — DEXAMETHASONE SODIUM PHOSPHATE 4 MG: 4 INJECTION, SOLUTION INTRA-ARTICULAR; INTRALESIONAL; INTRAMUSCULAR; INTRAVENOUS; SOFT TISSUE at 09:03

## 2025-03-11 NOTE — PROGRESS NOTES
"[facility]  Chief Complaint      Chief Complaint   Patient presents with    Gout     Patient presents to clinic c/o gout of right foot. Rates pain "8" on numeric scale.        History of Present Illness      Sami Kerns is a 75 y.o.  who presents today for evaluation of "gout pain" to right foot. Describes pain as "sharp and aching". Rates pain as "8"/10. Onset of right foot pain 5 days ago, the pt reports taking OTC tylenol with minimal relief of discomfort.    HPI     Past Medical History:  Past Medical History:   Diagnosis Date    Adenomatous polyp of ascending colon 11/29/2021    Adenomatous polyp of descending colon 11/29/2021    Adenomatous polyp of transverse colon 11/29/2021    Anticoagulant long-term use     Blood in stool 11/29/2021    CAD (coronary artery disease)     CHF (congestive heart failure)     COPD (chronic obstructive pulmonary disease)     SOB with walking distances 10/2022    GERD (gastroesophageal reflux disease)     Gout     HH (hiatus hernia) 05/16/2022    HTN (hypertension)     Hyperlipidemia     Iron deficiency anemia due to chronic blood loss 11/29/2021    follows with : Anemia, Monoclonal Gammopathy    Orchitis 01/20/2022    Osteoarthritis     Polyp of splenic flexure of colon 11/29/2021    Screening for colon cancer 11/29/2021    Urinary incontinence 01/20/2022    Vocal cord polyps     followed by  2022       Past Surgical History:   has a past surgical history that includes Coronary Artery Bypass Graft (CABG); Tonsillectomy (N/A); Direct laryngoscopy (Bilateral, 05/10/2022); Vocal fold lesion excision (Bilateral, 05/10/2022); Direct laryngoscopy (N/A, 11/11/2022); Vocal fold lesion excision (N/A, 11/11/2022); Direct laryngoscopy (N/A, 8/15/2023); and Vocal fold lesion excision (Right, 8/15/2023).    Social History:  Social History[1]    I personally reviewed all past medical, surgical, and social.     Review of Systems "   Constitutional:  Negative for fatigue, fever and unexpected weight change.   Respiratory:  Negative for cough, shortness of breath and wheezing.    Cardiovascular:  Negative for chest pain and leg swelling.   Gastrointestinal:  Negative for abdominal pain, constipation, diarrhea, nausea and vomiting.   Genitourinary:  Negative for difficulty urinating, dysuria, flank pain and frequency.   Musculoskeletal:  Positive for joint swelling and myalgias.   Skin:  Negative for color change and rash.   Neurological:  Negative for dizziness, weakness and headaches.      Medications:  Encounter Medications[2]    Allergies:  Review of patient's allergies indicates:  No Known Allergies    Health Maintenance:  Immunization History   Administered Date(s) Administered    COVID-19 MRNA, LN-S PF (MODERNA HALF 0.25 ML DOSE) 07/08/2022    COVID-19 Vaccine 09/02/2024    COVID-19, MRNA, LN-S, PF (MODERNA FULL 0.5 ML DOSE) 03/01/2021, 03/30/2021, 08/24/2021    Influenza - Quadrivalent - PF *Preferred* (6 months and older) 10/04/2021    Pneumococcal Conjugate - 13 Valent 02/01/2022    Pneumococcal Polysaccharide - 23 Valent 06/04/2020    Rsv, Bivalent, Rsvpref (Abrysvo) 09/16/2024    Zoster 05/06/2024, 05/13/2024        Health Maintenance   Topic Date Due    TETANUS VACCINE  Never done    Shingles Vaccine (2 of 3) 07/08/2024    Colorectal Cancer Screening  11/29/2024    Annual UACr  03/11/2025    Lipid Panel  04/08/2025    LDCT Lung Screen  04/26/2025    High Dose Statin  02/26/2026    Hepatitis C Screening  Completed    Influenza Vaccine  Completed    COVID-19 Vaccine  Completed    RSV Vaccine (Age 60+ and Pregnant patients)  Completed    Pneumococcal Vaccines (Age 50+)  Completed    Abdominal Aortic Aneurysm Screening  Completed        Physical Exam      Vital Signs  Temp: 97.6 °F (36.4 °C)  Temp Source: Oral  Pulse: (!) 53  SpO2: 98 %  BP: (!) 149/64  BP Location: Left arm  Patient Position: Sitting  Pain Score:    8  Pain Loc: Foot (right)]    Physical Exam  Vitals reviewed.   Constitutional:       Appearance: Normal appearance.   HENT:      Head: Normocephalic.      Mouth/Throat:      Mouth: Mucous membranes are moist.      Pharynx: Oropharynx is clear.   Eyes:      Conjunctiva/sclera: Conjunctivae normal.   Cardiovascular:      Rate and Rhythm: Normal rate and regular rhythm.      Pulses: Normal pulses.      Heart sounds: Normal heart sounds. No murmur heard.  Pulmonary:      Effort: Pulmonary effort is normal. No respiratory distress.      Breath sounds: Normal breath sounds. No wheezing, rhonchi or rales.   Abdominal:      Palpations: Abdomen is soft.      Tenderness: There is no abdominal tenderness.   Musculoskeletal:         General: Normal range of motion.      Cervical back: Neck supple.      Right foot: Normal range of motion. Swelling and tenderness present. No deformity.      Comments: (pain, swelling, mild erythema at base of right great toe)   Skin:     General: Skin is warm and dry.   Neurological:      Mental Status: He is alert and oriented to person, place, and time.   Psychiatric:         Mood and Affect: Mood normal.         Behavior: Behavior normal.        Laboratory:  CBC:  Recent Labs   Lab 03/11/24  1401 03/27/24  1007 07/17/24  2121   WBC 6.22 6.69 7.74   RBC 4.38 L 4.53 L 4.36 L   Hemoglobin 12.1 L 12.4 L 12.1 L   Hematocrit 35.6 L 36.9 L 35.1 L   Platelet Count 226 239 217   MCV 81.3 81.5 80.5   MCH 27.6 27.4 27.8   MCHC 34.0 33.6 34.5       LIPIDS:  Recent Labs   Lab 03/29/22  1136 04/12/22  1433 04/03/23  1209 04/08/24  1225   TSH  --  0.009 L  --   --    HDL Cholesterol 35 L  --  38 L 40   Cholesterol 103  --  106 97   Triglycerides 124  --  204 H 101   LDL Calculated 43  --  27 37   Cholesterol/HDL Ratio (Risk Factor) 2.9  --  2.8 2.4   Non-HDL 68  --  68 57       TSH:  Recent Labs   Lab 04/12/22  1433   TSH 0.009 L       A1C:        Lab Results   Component Value Date     (H) 07/17/2024  "    (L) 07/17/2024    K 4.8 07/17/2024     07/17/2024    CO2 21 07/17/2024    BUN 29 (H) 07/17/2024    CREATININE 1.98 (H) 07/17/2024    CALCIUM 9.2 07/17/2024    PROT 8.3 (H) 07/17/2024    ALBUMIN 3.6 07/17/2024    BILITOT 0.3 07/17/2024    ALKPHOS 89 07/17/2024    AST 17 07/17/2024    ALT 21 07/17/2024    ANIONGAP 11 07/17/2024    ESTGFRAFRICA 51 (L) 12/08/2021    EGFRNONAA 32 (L) 04/20/2022       Lab Results   Component Value Date    WBC 7.74 07/17/2024    RBC 4.36 (L) 07/17/2024    HGB 12.1 (L) 07/17/2024    HCT 35.1 (L) 07/17/2024    MCV 80.5 07/17/2024    RDW 15.4 (H) 07/17/2024     07/17/2024        Lab Results   Component Value Date    CHOL 97 04/08/2024    TRIG 101 04/08/2024    HDL 40 04/08/2024    LDLCALC 37 04/08/2024       Lab Results   Component Value Date    TSH 0.009 (L) 04/12/2022       No results found for: "HGBA1C", "ESTIMATEDAVG"     No components found for: "VITAMIND"    Lab Results   Component Value Date    PSA 2.770 04/08/2024       Point Of Care Testing:  Nitrites, UA   Date Value Ref Range Status   07/17/2024 Negative Negative Final     Urobilinogen, UA   Date Value Ref Range Status   07/17/2024 Normal 0.2, 1.0, Normal mg/dL Final     pH, UA   Date Value Ref Range Status   07/17/2024 5.5 5.0 to 8.0 pH Units Final     Specific Gravity, UA   Date Value Ref Range Status   07/17/2024 1.010 <=1.030 Final     Ketones, UA   Date Value Ref Range Status   07/17/2024 Negative Negative mg/dL Final       No results found for: "TFLMUHZ0IB", "RAPFLUA", "RAPFLUB"      Assessment/Plan     1. Acute gout of right foot, unspecified cause  -     Uric Acid; Future; Expected date: 03/11/2025           Return to clinic as scheduled for follow up with Dr. Weems.    Questions answered to desired level of satisfaction    Verbalized understanding to all information and instructions provided      [facility]           [1]  Social History  Tobacco Use "    Smoking status: Some Days     Current packs/day: 0.50     Average packs/day: 0.5 packs/day for 56.0 years (28.0 ttl pk-yrs)     Types: Cigarettes     Passive exposure: Current    Smokeless tobacco: Current    Tobacco comments:     Smokes 3 cigarettes daily   Substance Use Topics    Alcohol use: Yes     Alcohol/week: 2.0 standard drinks of alcohol     Types: 2 Cans of beer per week     Comment: occasionally    Drug use: Never   [2]  Outpatient Encounter Medications as of 3/11/2025   Medication Sig Note Dispense Refill    albuterol (PROVENTIL/VENTOLIN HFA) 90 mcg/actuation inhaler INHALE 2 PUFFS BY MOUTH EVERY 6 HOURS AS NEEDED FOR WHEEZING  18 g 5    allopurinoL (ZYLOPRIM) 100 MG tablet Take 1 tablet (100 mg total) by mouth once daily.  90 tablet 1    amiodarone (PACERONE) 200 MG Tab Take by mouth once daily.       ascorbic acid, vitamin C, (VITAMIN C) 1000 MG tablet Take 1,000 mg by mouth once daily.       aspirin (ECOTRIN) 81 MG EC tablet Take 81 mg by mouth once daily.       atorvastatin (LIPITOR) 80 MG tablet Take 1 tablet by mouth once daily.       cilostazoL (PLETAL) 100 MG Tab Take 100 mg by mouth 2 (two) times daily.       clopidogreL (PLAVIX) 75 mg tablet Take 1 tablet (75 mg total) by mouth once daily.  90 tablet 1    colchicine (COLCRYS) 0.6 mg tablet Take one tablet by mouth and then repeat dose in 1 hour.  4 tablet 0    diclofenac (VOLTAREN) 75 MG EC tablet Take 1 tablet (75 mg total) by mouth 2 (two) times daily.  30 tablet 0    ezetimibe (ZETIA) 10 mg tablet Take 10 mg by mouth once daily.       furosemide (LASIX) 40 MG tablet Take 40 mg by mouth once daily.       hydrALAZINE (APRESOLINE) 50 MG tablet Take 1 tablet (50 mg total) by mouth 3 (three) times daily.  90 tablet 1    indomethacin (INDOCIN) 25 MG capsule Take 1 capsule (25 mg total) by mouth 2 (two) times daily as needed (for pain).  9 capsule 3    isosorbide mononitrate (IMDUR) 30 MG 24 hr tablet Take 15 mg by mouth.        latanoprost 0.005 % ophthalmic solution Place 1 drop into both eyes every evening.       losartan (COZAAR) 50 MG tablet Take 2 tablets (100 mg total) by mouth once daily.  90 tablet 1    metoprolol succinate (TOPROL-XL) 50 MG 24 hr tablet Take 1 tablet by mouth once daily.       naproxen (NAPROSYN) 500 MG tablet TAKE ONE TABLET BY MOUTH TWICE DAILY AS NEEDED  20 tablet 4    nitroGLYCERIN (NITROSTAT) 0.4 MG SL tablet Place under the tongue. 11/9/2023: PRN      predniSONE (DELTASONE) 10 MG tablet Take 1 tablet (10 mg total) by mouth once daily.  5 tablet 0    tamsulosin (FLOMAX) 0.4 mg Cap TAKE ONE CAPSULE BY MOUTH EVERY DAY  90 capsule 3    timolol maleate 0.5% (TIMOPTIC) 0.5 % Drop Place into both eyes.       traMADoL (ULTRAM) 50 mg tablet Take 1 tablet (50 mg total) by mouth every 6 (six) hours as needed for Pain.  12 tablet 0    valsartan (DIOVAN) 320 MG tablet Take 320 mg by mouth.        Facility-Administered Encounter Medications as of 3/11/2025   Medication Dose Route Frequency Provider Last Rate Last Admin    0.9%  NaCl infusion   Intravenous Continuous Jesse Smalls MD 50 mL/hr at 08/15/23 0714 New Bag at 08/15/23 0714

## 2025-03-18 ENCOUNTER — OFFICE VISIT (OUTPATIENT)
Dept: NEPHROLOGY | Facility: CLINIC | Age: 75
End: 2025-03-18
Payer: MEDICARE

## 2025-03-18 VITALS
OXYGEN SATURATION: 97 % | BODY MASS INDEX: 28.28 KG/M2 | DIASTOLIC BLOOD PRESSURE: 72 MMHG | RESPIRATION RATE: 18 BRPM | HEART RATE: 69 BPM | WEIGHT: 202 LBS | SYSTOLIC BLOOD PRESSURE: 142 MMHG | HEIGHT: 71 IN

## 2025-03-18 DIAGNOSIS — M10.9 GOUT, UNSPECIFIED CAUSE, UNSPECIFIED CHRONICITY, UNSPECIFIED SITE: ICD-10-CM

## 2025-03-18 DIAGNOSIS — N18.32 STAGE 3B CHRONIC KIDNEY DISEASE: Primary | ICD-10-CM

## 2025-03-18 DIAGNOSIS — I12.9 HYPERTENSIVE NEPHROSCLEROSIS, STAGE 1 THROUGH STAGE 4 OR UNSPECIFIED CHRONIC KIDNEY DISEASE: ICD-10-CM

## 2025-03-18 PROCEDURE — 99999 PR PBB SHADOW E&M-EST. PATIENT-LVL V: CPT | Mod: PBBFAC,,, | Performed by: INTERNAL MEDICINE

## 2025-03-18 PROCEDURE — 3078F DIAST BP <80 MM HG: CPT | Mod: CPTII,,, | Performed by: INTERNAL MEDICINE

## 2025-03-18 PROCEDURE — 99214 OFFICE O/P EST MOD 30 MIN: CPT | Mod: S$PBB,,, | Performed by: INTERNAL MEDICINE

## 2025-03-18 PROCEDURE — 3288F FALL RISK ASSESSMENT DOCD: CPT | Mod: CPTII,,, | Performed by: INTERNAL MEDICINE

## 2025-03-18 PROCEDURE — 1126F AMNT PAIN NOTED NONE PRSNT: CPT | Mod: CPTII,,, | Performed by: INTERNAL MEDICINE

## 2025-03-18 PROCEDURE — 99215 OFFICE O/P EST HI 40 MIN: CPT | Mod: PBBFAC | Performed by: INTERNAL MEDICINE

## 2025-03-18 PROCEDURE — 1159F MED LIST DOCD IN RCRD: CPT | Mod: CPTII,,, | Performed by: INTERNAL MEDICINE

## 2025-03-18 PROCEDURE — 3077F SYST BP >= 140 MM HG: CPT | Mod: CPTII,,, | Performed by: INTERNAL MEDICINE

## 2025-03-18 PROCEDURE — 4010F ACE/ARB THERAPY RXD/TAKEN: CPT | Mod: CPTII,,, | Performed by: INTERNAL MEDICINE

## 2025-03-18 PROCEDURE — 3066F NEPHROPATHY DOC TX: CPT | Mod: CPTII,,, | Performed by: INTERNAL MEDICINE

## 2025-03-18 PROCEDURE — 3008F BODY MASS INDEX DOCD: CPT | Mod: CPTII,,, | Performed by: INTERNAL MEDICINE

## 2025-03-18 PROCEDURE — 1101F PT FALLS ASSESS-DOCD LE1/YR: CPT | Mod: CPTII,,, | Performed by: INTERNAL MEDICINE

## 2025-03-18 RX ORDER — ALLOPURINOL 100 MG/1
200 TABLET ORAL DAILY
Qty: 90 TABLET | Refills: 1 | Status: SHIPPED | OUTPATIENT
Start: 2025-03-18

## 2025-03-18 NOTE — PROGRESS NOTES
[facility] History and Physical  Patient Name: Sami Kerns  MRN: [id number]  Age: 75 y.o.  : 1950    Date: 3/18/2025 2:25 PM    Chief Complaint: Follow Up    HPI :   Mr Kerns presents to Nephrology clinic for follow up. He is followed by Dr. Michael Weems MD as her primary care provider.     HTN: diagnosed about 15 years ago. Current regimen includes lasix 40 mg daily, metoprolol 50 mg daily, valsartan 320 mg daily, hydralazine 50 mg BID. Well controlled per patient    CAD s/p CABG in 2017/CHF. ASA. Follows with Dr. Celina Melchor     Monoclonal gammopathy: He is followed by Dr. Arenas.     Nephrology history: FH of kidney disease- sister.  No nephrolithiasis, or recurrent UTIs. Takes Naproxen 500 mg as needed. Takes OTC ibuprofen as well.    Patient denies any CP, SOB, peripheral edema, dysuria, hematuria, changes in urinary habits, or increased frequency of urination. Recent gout flares.      Past Medical History:  has a past medical history of Adenomatous polyp of ascending colon (2021), Adenomatous polyp of descending colon (2021), Adenomatous polyp of transverse colon (2021), Anticoagulant long-term use, Blood in stool (2021), CAD (coronary artery disease), CHF (congestive heart failure), COPD (chronic obstructive pulmonary disease), GERD (gastroesophageal reflux disease), Gout, HH (hiatus hernia) (2022), HTN (hypertension), Hyperlipidemia, Iron deficiency anemia due to chronic blood loss (2021), Orchitis (2022), Osteoarthritis, Polyp of splenic flexure of colon (2021), Screening for colon cancer (2021), Urinary incontinence (2022), and Vocal cord polyps.     Past Surgical History:   has a past surgical history that includes Coronary Artery Bypass Graft (CABG); Tonsillectomy (N/A); Direct laryngoscopy (Bilateral, 05/10/2022); Vocal fold lesion excision (Bilateral, 05/10/2022);  Direct laryngoscopy (N/A, 11/11/2022); Vocal fold lesion excision (N/A, 11/11/2022); Direct laryngoscopy (N/A, 8/15/2023); and Vocal fold lesion excision (Right, 8/15/2023).     Family History:  family history includes Hypertension in his mother. No family history of kidney disease.     Social History:   reports that he has been smoking cigarettes. He has a 28 pack-year smoking history. He has been exposed to tobacco smoke. He uses smokeless tobacco. He reports current alcohol use of about 2.0 standard drinks of alcohol per week. He reports that he does not use drugs.     Allergies: has no known allergies.     Medications: Reviewed including OTC medications, herbal supplements, and NSAIDS.     Old records have been reviewed.      Review of Systems:  ROS: A 10 point ROS was completed and found to be negative except for that mentioned above.          Physical Exam:  Vitals:    03/18/25 1259   BP: (!) 142/72   Pulse: 69   Resp: 18           Constitutional: sitting in chair, in NAD  Eyes: EOMI, white sclera  ENMT: moist mucus membranes, nares patent  Cardiovascular: normal rate, S1/S2 noted, no edema  Respiratory: symmetrical chest expansion, CTA-B  Gastrointestinal: +BS, soft, NT/ND  Musculoskeletal: normal, no joint erythema/effusions  Skin: no rash, no purpura, warm extremities  Neurological: Alert and Oriented x 4, afocal    Labs:   Lab Results   Component Value Date     (L) 07/17/2024    K 4.8 07/17/2024    CREATININE 1.98 (H) 07/17/2024    ALT 21 07/17/2024    AST 17 07/17/2024    LDLCALC 37 04/08/2024    CHOL 97 04/08/2024    HDL 40 04/08/2024    TRIG 101 04/08/2024    TSH 0.009 (L) 04/12/2022    WBC 7.74 07/17/2024    HGB 12.1 (L) 07/17/2024    HCT 35.1 (L) 07/17/2024     07/17/2024    PSA 2.770 04/08/2024        Otherwise Reviewed    Assessment/Plan:       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 Baseline sCr TBD today sCr pending. Counseled to avoid  nephrotoxic agents such as NSAIDs. Discussed again today. Recommend tylenol first.    Gout: increase allopurinol to 200 mg daily. If still having issues with gout, I would add uloric for prevention.    Proteinuria: urine Prot:Creat ratio is pending. Patient is on RAAS blockade with ARB    Anemia: CBC pending. Follows with Heme/Onc    BMD: Calcium and Phosphorus PTH, Vit D pending    HTN: well controlled with current meds    RTC 6months with CBC, RFP, UA, urine for Prot:creat ratio, PTH, Vit D      Alisha S. Parker, DO [facility]   03/18/2025

## 2025-03-19 ENCOUNTER — OFFICE VISIT (OUTPATIENT)
Dept: FAMILY MEDICINE | Facility: CLINIC | Age: 75
End: 2025-03-19
Payer: MEDICARE

## 2025-03-19 ENCOUNTER — TELEPHONE (OUTPATIENT)
Dept: NEPHROLOGY | Facility: CLINIC | Age: 75
End: 2025-03-19
Payer: MEDICARE

## 2025-03-19 ENCOUNTER — RESULTS FOLLOW-UP (OUTPATIENT)
Dept: NEPHROLOGY | Facility: CLINIC | Age: 75
End: 2025-03-19

## 2025-03-19 ENCOUNTER — OFFICE VISIT (OUTPATIENT)
Dept: OTOLARYNGOLOGY | Facility: CLINIC | Age: 75
End: 2025-03-19
Payer: MEDICARE

## 2025-03-19 VITALS — HEIGHT: 71 IN | BODY MASS INDEX: 28.28 KG/M2 | WEIGHT: 202 LBS

## 2025-03-19 VITALS
RESPIRATION RATE: 17 BRPM | OXYGEN SATURATION: 98 % | HEIGHT: 71 IN | TEMPERATURE: 98 F | WEIGHT: 200 LBS | DIASTOLIC BLOOD PRESSURE: 63 MMHG | HEART RATE: 68 BPM | BODY MASS INDEX: 28 KG/M2 | SYSTOLIC BLOOD PRESSURE: 121 MMHG

## 2025-03-19 DIAGNOSIS — E11.9 TYPE 2 DIABETES MELLITUS WITHOUT COMPLICATION, WITHOUT LONG-TERM CURRENT USE OF INSULIN: ICD-10-CM

## 2025-03-19 DIAGNOSIS — J38.1 VOCAL CORD POLYPS: Primary | ICD-10-CM

## 2025-03-19 DIAGNOSIS — R49.0 HOARSENESS: ICD-10-CM

## 2025-03-19 DIAGNOSIS — E55.9 VITAMIN D DEFICIENCY, UNSPECIFIED: Primary | ICD-10-CM

## 2025-03-19 DIAGNOSIS — F17.200 TOBACCO DEPENDENCY: ICD-10-CM

## 2025-03-19 DIAGNOSIS — I73.9 PVD (PERIPHERAL VASCULAR DISEASE): Primary | ICD-10-CM

## 2025-03-19 PROCEDURE — 3062F POS MACROALBUMINURIA REV: CPT | Mod: ,,, | Performed by: INTERNAL MEDICINE

## 2025-03-19 PROCEDURE — 31575 DIAGNOSTIC LARYNGOSCOPY: CPT | Mod: PBBFAC | Performed by: OTOLARYNGOLOGY

## 2025-03-19 PROCEDURE — 1159F MED LIST DOCD IN RCRD: CPT | Mod: ,,, | Performed by: INTERNAL MEDICINE

## 2025-03-19 PROCEDURE — 1126F AMNT PAIN NOTED NONE PRSNT: CPT | Mod: ,,, | Performed by: INTERNAL MEDICINE

## 2025-03-19 PROCEDURE — 4010F ACE/ARB THERAPY RXD/TAKEN: CPT | Mod: ,,, | Performed by: INTERNAL MEDICINE

## 2025-03-19 PROCEDURE — 3074F SYST BP LT 130 MM HG: CPT | Mod: ,,, | Performed by: INTERNAL MEDICINE

## 2025-03-19 PROCEDURE — 3066F NEPHROPATHY DOC TX: CPT | Mod: ,,, | Performed by: INTERNAL MEDICINE

## 2025-03-19 PROCEDURE — 99214 OFFICE O/P EST MOD 30 MIN: CPT | Mod: ,,, | Performed by: INTERNAL MEDICINE

## 2025-03-19 PROCEDURE — 99214 OFFICE O/P EST MOD 30 MIN: CPT | Mod: PBBFAC | Performed by: OTOLARYNGOLOGY

## 2025-03-19 PROCEDURE — 3078F DIAST BP <80 MM HG: CPT | Mod: ,,, | Performed by: INTERNAL MEDICINE

## 2025-03-19 PROCEDURE — 99999 PR PBB SHADOW E&M-EST. PATIENT-LVL IV: CPT | Mod: PBBFAC,,, | Performed by: OTOLARYNGOLOGY

## 2025-03-19 PROCEDURE — 3008F BODY MASS INDEX DOCD: CPT | Mod: ,,, | Performed by: INTERNAL MEDICINE

## 2025-03-19 RX ORDER — INSULIN PUMP SYRINGE, 3 ML
EACH MISCELLANEOUS
Qty: 1 EACH | Refills: 0 | Status: SHIPPED | OUTPATIENT
Start: 2025-03-19 | End: 2026-03-19

## 2025-03-19 RX ORDER — METFORMIN HYDROCHLORIDE 500 MG/1
500 TABLET ORAL 2 TIMES DAILY WITH MEALS
Qty: 180 TABLET | Refills: 1 | Status: SHIPPED | OUTPATIENT
Start: 2025-03-19

## 2025-03-19 RX ORDER — ERGOCALCIFEROL 1.25 MG/1
50000 CAPSULE ORAL
Qty: 12 CAPSULE | Refills: 0 | Status: SHIPPED | OUTPATIENT
Start: 2025-03-19 | End: 2025-06-05

## 2025-03-19 RX ORDER — LANCETS
1 EACH MISCELLANEOUS 2 TIMES DAILY
Qty: 200 EACH | Refills: 1 | Status: SHIPPED | OUTPATIENT
Start: 2025-03-19

## 2025-03-19 NOTE — TELEPHONE ENCOUNTER
----- Message from Tana Serrano DO sent at 3/19/2025 11:21 AM CDT -----  Mr Kerns has great blood counts no anemia. Vit D deficiency- please send ergo. Renal function stable.  Please be sure to hydrate well. Ty  ----- Message -----  From: Lab, Background User  Sent: 3/18/2025   2:50 PM CDT  To: Tana Serrano DO

## 2025-03-19 NOTE — PROGRESS NOTES
"Subjective:       Patient ID: Sami Kerns is a 75 y.o. male.    Chief Complaint: Hemorrhoids (Follow up ), Health Maintenance (TETANUS VACCINE Never done- Decline /Shingles Vaccine(2 of 3) due on 07/08/2024- Decline/Colorectal Cancer Screening due on 11/29/2024- Schedule /Lipid Panel due on 04/08/2025 today ), and Blood Sugar Problem    History of Present Illness    CHIEF COMPLAINT:  Patient presents today for follow up.    SOCIAL HISTORY:  He remains an active smoker.    DIET:  He reports consuming whole wheat bread as part of his diet.    DIABETES:  He reports elevated A1C and is not currently taking any diabetes medications.    LABS:  MICHAEL testing shows decreased circulation with right leg MICHAEL of 0.51 and left leg MICHAEL of 0.43, both below normal threshold of 0.8. Creatinine was elevated at 2.2 mg/dL (reference range 1.1 mg/dL). GFR was decreased at 31 (reference range >60).         Current Medications:  Current Medications[1]           ROS  Twelve point system reviewed, unremarkable except for stated above in HPI.        Objective:         Vitals:    03/19/25 1408   BP: 121/63   BP Location: Left arm   Patient Position: Sitting   Pulse: 68   Resp: 17   Temp: 97.9 °F (36.6 °C)   TempSrc: Temporal   SpO2: 98%   Weight: 90.7 kg (200 lb)   Height: 5' 11" (1.803 m)        Physical Exam     Patient is awake alert oriented person place and  Lungs are clear to auscultation bilaterally no crackles or wheezes   Cardiovascular S1-S2 regular rate and rhythm no murmurs rubs or gallops   Abdomen is soft positive bowel sounds nontender, extremities no clubbing cyanosis edema  Neuro no focal neurological deficits  Skin warm and dry.     Last Labs:     Lab Visit on 03/18/2025   Component Date Value    Color, UA 03/18/2025 Light Yellow     Clarity, UA 03/18/2025 Clear     pH, UA 03/18/2025 5.5     Leukocytes, UA 03/18/2025 Negative     Nitrites, UA 03/18/2025 Negative     Protein, UA 03/18/2025 30 (A)     Glucose, UA 03/18/2025 " Normal     Ketones, UA 03/18/2025 Negative     Urobilinogen, UA 03/18/2025 Normal     Bilirubin, UA 03/18/2025 Negative     Blood, UA 03/18/2025 Negative     Specific Gravity, UA 03/18/2025 1.012     Iron Level 03/18/2025 54 (L)     Iron Binding Capacity To* 03/18/2025 261     Iron Binding Capacity Un* 03/18/2025 207     Iron Saturation 03/18/2025 21     Transferrin 03/18/2025 228     Ferritin 03/18/2025 201     Creatinine, Urine 03/18/2025 82     Microalbumin 03/18/2025 30.1 (H)     Microalbumin/Creatinine * 03/18/2025 367.1 (H)     Creatinine, Urine 03/18/2025 82     Protein, Urine 03/18/2025 38.2 (H)     Protein/Creatinine Ratio 03/18/2025 0.466 (H)     PTH 03/18/2025 258.30 (H)     Sodium 03/18/2025 133 (L)     Potassium 03/18/2025 4.8     Chloride 03/18/2025 100     CO2 03/18/2025 20 (L)     Anion Gap 03/18/2025 18 (H)     Glucose 03/18/2025 193 (H)     BUN 03/18/2025 33 (H)     Creatinine 03/18/2025 2.20 (H)     BUN/Creatinine Ratio 03/18/2025 15     Calcium 03/18/2025 9.0     Albumin 03/18/2025 3.5     Phosphorus 03/18/2025 4.3     eGFR 03/18/2025 31 (L)     Vitamin D 25-Hydroxy, Bl* 03/18/2025 13.7 (L)     WBC 03/18/2025 6.80     RBC 03/18/2025 4.63     Hemoglobin 03/18/2025 14.0     Hematocrit 03/18/2025 38.0 (L)     MCV 03/18/2025 82.1     MCH 03/18/2025 30.2     MCHC 03/18/2025 36.8 (H)     RDW 03/18/2025 14.9 (H)     Platelet Count 03/18/2025 293     MPV 03/18/2025 10.9     Neutrophils % 03/18/2025 66.5 (H)     Lymphocytes % 03/18/2025 25.0 (L)     Monocytes % 03/18/2025 6.5 (H)     Eosinophils % 03/18/2025 0.7 (L)     Basophils % 03/18/2025 0.6     Immature Granulocytes % 03/18/2025 0.7 (H)     nRBC, Auto 03/18/2025 0.0     Neutrophils, Abs 03/18/2025 4.52     Lymphocytes, Absolute 03/18/2025 1.70     Monocytes, Absolute 03/18/2025 0.44     Eosinophils, Absolute 03/18/2025 0.05     Basophils, Absolute 03/18/2025 0.04     Immature Granulocytes, A* 03/18/2025 0.05 (H)     nRBC, Absolute 03/18/2025 0.00      Diff Type 03/18/2025 Auto     WBC, UA 03/18/2025 1     RBC, UA 03/18/2025 <1     Squamous Epithelial Cell* 03/18/2025 Occasional (A)     Hyaline Casts, UA 03/18/2025 0-2 (A)     Mucous 03/18/2025 Occasional (A)    Office Visit on 03/11/2025   Component Date Value    Uric Acid 03/11/2025 7.0        Last Imaging:  X-Ray Lumbar Spine Ap And Lateral  Narrative: EXAMINATION:  XR LUMBAR SPINE AP AND LATERAL    CLINICAL HISTORY:  back pain;    TECHNIQUE:  AP, lateral and spot images were performed of the lumbar spine.    COMPARISON:  12/01/2021    FINDINGS:  No fracture or malalignment.  There is mild multilevel disc change.  Moderate calcification of the aorta is present.  Impression: Mild multilevel lumbar degenerative disc change.    Electronically signed by: Steven Sweeney MD  Date:    10/11/2024  Time:    10:18  X-Ray Thoracic Spine AP Lateral  Narrative: EXAMINATION:  XR THORACIC SPINE AP LATERAL    CLINICAL HISTORY:  back pain;    TECHNIQUE:  AP and lateral views of the thoracic spine were performed.    COMPARISON:  None    FINDINGS:  There has been a sternotomy.  No fracture or malalignment.  There is mild multilevel thoracic degenerative disc change.  Impression: Mild thoracic degenerative change.    Electronically signed by: Steven Sweeney MD  Date:    10/11/2024  Time:    10:17         **Labs and x-rays personally reviewed by me    ** reviewed           Assessment & Plan:   Assessment & Plan    E11.65 Type 2 diabetes mellitus with hyperglycemia  I73.89 Other specified peripheral vascular diseases  N18.32 Chronic kidney disease, stage 3b  F17.200 Nicotine dependence, unspecified, uncomplicated  E55.9 Vitamin D deficiency, unspecified    TYPE 2 DIABETES MELLITUS:  - Noted elevated A1C, indicating poor diabetes control.  - Prescribed metformin 1 pill twice daily for diabetes management.  - Referred the patient to a diabetic teaching nurse for diabetes education.  - Advised dietary modifications to  manage diabetes: reduce intake of rice, potatoes, pasta, and bread; recommended 100% whole wheat bread.  - Implemented cautious approach to diabetes treatment due to kidney function; will monitor closely and adjust if needed.  - Recommend reducing consumption of rice, potatoes, pasta, and bread.  - Recommend switching to 100% whole wheat bread instead of light bread.    PERIPHERAL VASCULAR DISEASE:  - Reviewed MICHAEL results indicating peripheral vascular disease (PVD): right leg 0.51, left leg 0.43 (normal >0.8).  - Explained MICHAEL results and implications for circulation to the patient.  - Assessed potential causes of poor circulation: smoking, hypertension, and diabetes.  - Referred the patient to Dr. Pepe Thomas, vascular surgeon, for evaluation and potential stenting of peripheral vascular disease.  - Discussed relationship between smoking, elevated blood pressure, and poor circulation.    CHRONIC KIDNEY DISEASE:  - Acknowledged need to monitor renal function closely due to patient's kidney condition.  - Assessed chronic kidney disease: creatinine 2.2 (normal 1.1), GFR 31 (normal >60).  - Educated the patient on chronic kidney disease, emphasizing current status does not require dialysis.  - Acknowledged need for close monitoring of kidney function.    NICOTINE DEPENDENCE:  - Confirmed the patient is still smoking.  - Acknowledged smoking as a potential cause of poor circulation.  - Referred the patient to smoking cessation class.  - Encouraged the patient to quit smoking.    VITAMIN D DEFICIENCY:  - Evaluated vitamin D deficiency.  - Prescribed vitamin D2 50,000 units orally for 7 days to address deficiency.    FOLLOW-UP:  - Follow up in 2 months (May).  - Contact the  to schedule the follow-up visit.           1. PVD (peripheral vascular disease)  -     Ambulatory referral/consult to Vascular Surgery; Future; Expected date: 03/26/2025    2. Type 2 diabetes mellitus without complication, without long-term  current use of insulin  -     Ambulatory referral/consult to Diabetes Education; Future; Expected date: 03/26/2025  -     metFORMIN (GLUCOPHAGE) 500 MG tablet; Take 1 tablet (500 mg total) by mouth 2 (two) times daily with meals.  Dispense: 180 tablet; Refill: 1  -     blood-glucose meter kit; Use as instructed  Dispense: 1 each; Refill: 0  -     lancets (LANCETS,THIN) Misc; 1 each by Misc.(Non-Drug; Combo Route) route 2 (two) times daily.  Dispense: 200 each; Refill: 1  -     blood sugar diagnostic Strp; 1 each by Misc.(Non-Drug; Combo Route) route 2 (two) times a day.  Dispense: 200 each; Refill: 1    3. Tobacco dependency  Due to the many adverse health risks of smoking tobacco,  Patient has been encouraged to quit smoking, and smoking sensation treatments have been   treatments have been offered and a  smoking cessation class has been recommended to the patient        .carrol Weems MD  This note was generated with the assistance of ambient listening technology. Verbal consent was obtained by the patient and accompanying visitor(s) for the recording of patient appointment to facilitate this note. I attest to having reviewed and edited the generated note for accuracy, though some syntax or spelling errors may persist. Please contact the author of this note for any clarification.            [1]   Current Outpatient Medications:     albuterol (PROVENTIL/VENTOLIN HFA) 90 mcg/actuation inhaler, INHALE 2 PUFFS BY MOUTH EVERY 6 HOURS AS NEEDED FOR WHEEZING, Disp: 18 g, Rfl: 5    allopurinoL (ZYLOPRIM) 100 MG tablet, Take 2 tablets (200 mg total) by mouth once daily., Disp: 90 tablet, Rfl: 1    amiodarone (PACERONE) 200 MG Tab, Take by mouth once daily., Disp: , Rfl:     ascorbic acid, vitamin C, (VITAMIN C) 1000 MG tablet, Take 1,000 mg by mouth once daily., Disp: , Rfl:     aspirin (ECOTRIN) 81 MG EC tablet, Take 81 mg by mouth once daily., Disp: , Rfl:     atorvastatin (LIPITOR) 80 MG tablet, Take  1 tablet by mouth once daily., Disp: , Rfl:     cilostazoL (PLETAL) 100 MG Tab, Take 100 mg by mouth 2 (two) times daily., Disp: , Rfl:     clopidogreL (PLAVIX) 75 mg tablet, Take 1 tablet (75 mg total) by mouth once daily., Disp: 90 tablet, Rfl: 1    ergocalciferol (ERGOCALCIFEROL) 50,000 unit Cap, Take 1 capsule (50,000 Units total) by mouth every 7 days. for 12 doses, Disp: 12 capsule, Rfl: 0    ezetimibe (ZETIA) 10 mg tablet, Take 10 mg by mouth once daily., Disp: , Rfl:     furosemide (LASIX) 40 MG tablet, Take 40 mg by mouth once daily., Disp: , Rfl:     hydrALAZINE (APRESOLINE) 50 MG tablet, Take 1 tablet (50 mg total) by mouth 3 (three) times daily., Disp: 90 tablet, Rfl: 1    indomethacin (INDOCIN) 25 MG capsule, Take 1 capsule (25 mg total) by mouth 2 (two) times daily as needed (for pain)., Disp: 20 capsule, Rfl: 0    isosorbide mononitrate (IMDUR) 30 MG 24 hr tablet, Take 15 mg by mouth., Disp: , Rfl:     latanoprost 0.005 % ophthalmic solution, Place 1 drop into both eyes every evening., Disp: , Rfl:     losartan (COZAAR) 50 MG tablet, Take 2 tablets (100 mg total) by mouth once daily., Disp: 90 tablet, Rfl: 1    metoprolol succinate (TOPROL-XL) 50 MG 24 hr tablet, Take 1 tablet by mouth once daily., Disp: , Rfl:     nitroGLYCERIN (NITROSTAT) 0.4 MG SL tablet, Place under the tongue., Disp: , Rfl:     tamsulosin (FLOMAX) 0.4 mg Cap, TAKE ONE CAPSULE BY MOUTH EVERY DAY, Disp: 90 capsule, Rfl: 3    timolol maleate 0.5% (TIMOPTIC) 0.5 % Drop, Place into both eyes., Disp: , Rfl:     traMADoL (ULTRAM) 50 mg tablet, Take 1 tablet (50 mg total) by mouth every 6 (six) hours as needed for Pain., Disp: 12 tablet, Rfl: 0    valsartan (DIOVAN) 320 MG tablet, Take 320 mg by mouth., Disp: , Rfl:     blood sugar diagnostic Strp, 1 each by Misc.(Non-Drug; Combo Route) route 2 (two) times a day., Disp: 200 each, Rfl: 1    blood-glucose meter kit, Use as instructed, Disp: 1 each, Rfl: 0    lancets (LANCETS,THIN) Misc, 1  each by Misc.(Non-Drug; Combo Route) route 2 (two) times daily., Disp: 200 each, Rfl: 1    metFORMIN (GLUCOPHAGE) 500 MG tablet, Take 1 tablet (500 mg total) by mouth 2 (two) times daily with meals., Disp: 180 tablet, Rfl: 1  No current facility-administered medications for this visit.    Facility-Administered Medications Ordered in Other Visits:     0.9%  NaCl infusion, , Intravenous, Continuous, Jesse Smalls MD, Last Rate: 50 mL/hr at 08/15/23 0714, New Bag at 08/15/23 0714

## 2025-03-19 NOTE — PROGRESS NOTES
Subjective:       Patient ID: Sami Kerns is a 75 y.o. male.    Chief Complaint: Hoarse (6 month follow-up on throat. Pt denies any dysphagia or hoarseness, states his throat has been feeling good. )    HPI  Review of Systems   HENT:  Negative for trouble swallowing and voice change.    All other systems reviewed and are negative.      Objective:      Physical Exam  General: NAD  Head: Normocephalic, atraumatic, no facial asymmetry/normal strength,  Ears: Both auricules normal in appearance, w/o deformities tympanic membranes normal external auditory canals normal  Nose: External nose w/o deformities normal turbinates no drainage or inflammation  Oral Cavity: Lips, gums, floor of mouth, tongue hard palate, and buccal mucosa without mass/lesion  Oropharynx: Mucosa pink and moist, soft palate, posterior pharynx and oropharyngeal wall without mass/lesion  Neck: Supple, symmetric, trachea midline, no palpable mass/lesion, no palpable cervical lymphadenopathy  Skin: Warm and dry, no concerning lesions  Respiratory: Respirations even, unlabored    Nasal/Nasopharyngo/Laryn/Hypopharyngoscopy Procedures   Procedure: Flexible laryngoscopy  In order to fully examine the upper aerodigestive tract, including the larynx, in a patient with a hyperactive gag reflex, flexible endoscopy is required.  After explaining the procedure and obtaining verbal consent, a timeout was performed with the patient's participation according to the universal protocol. Both nasal cavities were anesthetized with 4% Xylocaine spray mixed with Robinson-Synephrine. The flexible laryngoscope was inserted into the nasal cavity and advanced to visualize the nasal cavity, nasopharynx, the posterior oropharynx, hypopharynx, and the endolarynx with the above findings noted. The scope was removed and the procedure terminated. The patient tolerated this procedure well without apparent complication.      Procedure:  Diagnostic nasal, nasopharyngoscopy,  laryngoscopy and hypopharyngoscopy.    Routine preparation with local atomizer with 1% neosynephrine and lidocaine . With customary flexible endoscope.     NOSE:   External:  No gross deformity   Intranasal:    Mucosa:  No polyps, ulcers or lesions.    Septum:  No gross deformity.    Turbinates:  Not enlarged.    Nasopharynx:  No lesions.   Mucosa:  No lesions.   Posterior Choanae:  Patent.   Eustachian Tubes:  Patent.  Larynx/hypopharynx:   Epiglottis:  No lesions, without edema.   AE Folds:  No lesions.   Vocal cords:  No polyps; nl mobility   Subglottis: No obvious stenosis   Hypopharynx:  No lesions.   Piriform sinus:  No pooling or lesions.   Post Cricoid:  No edema or erythema  Assessment:       1. Vocal cord polyps    2. Hoarseness        Plan:       FOL normal exam  will watch   F/u prn hoarseness

## 2025-03-26 ENCOUNTER — INITIAL CONSULT (OUTPATIENT)
Dept: VASCULAR SURGERY | Facility: CLINIC | Age: 75
End: 2025-03-26
Payer: MEDICARE

## 2025-03-26 VITALS — WEIGHT: 199.94 LBS | BODY MASS INDEX: 27.99 KG/M2 | HEIGHT: 71 IN

## 2025-03-26 DIAGNOSIS — F17.200 SMOKER: ICD-10-CM

## 2025-03-26 DIAGNOSIS — I73.9 PVD (PERIPHERAL VASCULAR DISEASE): Primary | ICD-10-CM

## 2025-03-26 PROCEDURE — 99214 OFFICE O/P EST MOD 30 MIN: CPT | Mod: S$PBB,,, | Performed by: NURSE PRACTITIONER

## 2025-03-26 PROCEDURE — 99999 PR PBB SHADOW E&M-EST. PATIENT-LVL V: CPT | Mod: PBBFAC,,, | Performed by: NURSE PRACTITIONER

## 2025-03-26 PROCEDURE — 99215 OFFICE O/P EST HI 40 MIN: CPT | Mod: PBBFAC | Performed by: NURSE PRACTITIONER

## 2025-03-26 NOTE — PROGRESS NOTES
Subjective:       Patient ID: Sami Kerns is a 75 y.o. male.    Chief Complaint: Peripheral Vascular Disease  Established patient with Dr. Thomas, he has not been seen by us since June of 2022 at that time Dr. Thomas was considering angio left lower extremity for claudication possible aortoiliac disease with decreased MICHAEL 0.59 once Hematology workup completed  History of iron deficiency anemia followed by Dr. Arenas no history malignancy    He returns today no wounds no tissue loss he complains of arthritis pains in legs his main concern complaint is dyspnea palpitations with ambulation, his cardiologist is Dr. Melchor he has not seen him recently he denies rest pain, bilateral lower extremity pain, 1 leg does not hurt worse than the other    Past medical history includes CAD with CABG with bilateral lower extremity vein harvesting emphysema nondiabetic he does smoke      family history includes Hypertension in his mother.  Past Medical History:   Diagnosis Date    Adenomatous polyp of ascending colon 11/29/2021    Adenomatous polyp of descending colon 11/29/2021    Adenomatous polyp of transverse colon 11/29/2021    Anticoagulant long-term use     Blood in stool 11/29/2021    CAD (coronary artery disease)     CHF (congestive heart failure)     COPD (chronic obstructive pulmonary disease)     SOB with walking distances 10/2022    GERD (gastroesophageal reflux disease)     Gout     HH (hiatus hernia) 05/16/2022    HTN (hypertension)     Hyperlipidemia     Iron deficiency anemia due to chronic blood loss 11/29/2021    follows with : Anemia, Monoclonal Gammopathy    Orchitis 01/20/2022    Osteoarthritis     Polyp of splenic flexure of colon 11/29/2021    Screening for colon cancer 11/29/2021    Urinary incontinence 01/20/2022    Vocal cord polyps     followed by  2022      Past Surgical History:   Procedure Laterality Date    CORONARY ARTERY BYPASS GRAFT (CABG)      DIRECT LARYNGOSCOPY Bilateral  "05/10/2022    Procedure: LARYNGOSCOPY, DIRECT;  Surgeon: Jesse Smalls MD;  Location: [facility] OR;  Service: ENT;  Laterality: Bilateral;    DIRECT LARYNGOSCOPY N/A 11/11/2022    Procedure: LARYNGOSCOPY, DIRECT;  Surgeon: Jesse Smalls MD;  Location: [facility] OR;  Service: ENT;  Laterality: N/A;    DIRECT LARYNGOSCOPY N/A 8/15/2023    Procedure: LARYNGOSCOPY, DIRECT;  Surgeon: Jesse Smalls MD;  Location: [facility] OR;  Service: ENT;  Laterality: N/A;    TONSILLECTOMY N/A     at age 18    VOCAL FOLD LESION EXCISION Bilateral 05/10/2022    Procedure: EXCISION, LESION, VOCAL CORD;  Surgeon: Jesse Smalls MD;  Location: [facility] OR;  Service: ENT;  Laterality: Bilateral;    VOCAL FOLD LESION EXCISION N/A 11/11/2022    Procedure: EXCISION, LESION, VOCAL CORD POLYP;  Surgeon: Jesse Smalls MD;  Location: [facility] [place];  Service: ENT;  Laterality: N/A;    VOCAL FOLD LESION EXCISION Right 8/15/2023    Procedure: EXCISION, LESION, VOCAL CORD;  Surgeon: Jesse Smalls MD;  Location: [facility];  Service: ENT;  Laterality: Right;       reports that he has been smoking cigarettes. He has a 28 pack-year smoking history. He has been exposed to tobacco smoke. He uses smokeless tobacco. He reports current alcohol use of about 2.0 standard drinks of alcohol per week. He reports that he does not use drugs.   HPI  Review of Systems   Respiratory:  Positive for shortness of breath.    Cardiovascular:  Positive for palpitations and claudication. Negative for chest pain.   Neurological:  Positive for numbness.        Brief intermittent episode numbness right hand has resolved         Objective:      Ht 5' 11" (1.803 m)   Wt 90.7 kg (199 lb 15.3 oz)   BMI 27.89 kg/m²    Physical Exam  Vitals and nursing note reviewed.   Constitutional:       Appearance: Normal appearance.   HENT:      Head: Normocephalic.      Mouth/Throat:      Mouth: Mucous membranes are moist.   Eyes:      Conjunctiva/sclera: " Conjunctivae normal.   Cardiovascular:      Rate and Rhythm: Normal rate and regular rhythm.      Comments: Biphasic Doppler signal bilateral PT    Palpable right radial, right hand neurovascular status intact  Pulmonary:      Effort: Pulmonary effort is normal.   Abdominal:      Palpations: Abdomen is soft.   Musculoskeletal:      Right lower leg: No edema.      Left lower leg: No edema.   Skin:     General: Skin is warm and dry.      Comments: Bilateral lower extremity well healed incisions from previous vein harvest   Neurological:      Mental Status: He is alert and oriented to person, place, and time.   Psychiatric:         Mood and Affect: Mood normal.           Assessment:       1. PVD (peripheral vascular disease)    2. Smoker        Plan:       Encouraged patient to follow-up with cardiology due to his palpitations dyspnea we will repeat his arterial duplex with ABIs  Continue aspirin daily smoking cessation

## 2025-03-31 ENCOUNTER — EXTERNAL CHRONIC CARE MANAGEMENT (OUTPATIENT)
Dept: FAMILY MEDICINE | Facility: CLINIC | Age: 75
End: 2025-03-31
Payer: MEDICARE

## 2025-03-31 PROCEDURE — G0511 CCM/BHI BY RHC/FQHC 20MIN MO: HCPCS | Mod: ,,, | Performed by: INTERNAL MEDICINE

## 2025-04-07 DIAGNOSIS — N40.1 BPH WITH OBSTRUCTION/LOWER URINARY TRACT SYMPTOMS: Chronic | ICD-10-CM

## 2025-04-07 DIAGNOSIS — N13.8 BPH WITH OBSTRUCTION/LOWER URINARY TRACT SYMPTOMS: Chronic | ICD-10-CM

## 2025-04-09 RX ORDER — TAMSULOSIN HYDROCHLORIDE 0.4 MG/1
1 CAPSULE ORAL
Qty: 90 CAPSULE | Refills: 3 | Status: SHIPPED | OUTPATIENT
Start: 2025-04-09

## 2025-04-14 ENCOUNTER — ANESTHESIA (OUTPATIENT)
Dept: GASTROENTEROLOGY | Facility: HOSPITAL | Age: 75
End: 2025-04-14
Payer: MEDICARE

## 2025-04-14 ENCOUNTER — ANESTHESIA EVENT (OUTPATIENT)
Dept: GASTROENTEROLOGY | Facility: HOSPITAL | Age: 75
End: 2025-04-14
Payer: MEDICARE

## 2025-04-14 ENCOUNTER — HOSPITAL ENCOUNTER (OUTPATIENT)
Dept: GASTROENTEROLOGY | Facility: HOSPITAL | Age: 75
Discharge: HOME | End: 2025-04-14
Attending: NURSE PRACTITIONER | Admitting: INTERNAL MEDICINE
Payer: MEDICARE

## 2025-04-14 ENCOUNTER — RESULTS FOLLOW-UP (OUTPATIENT)
Dept: FAMILY MEDICINE | Facility: CLINIC | Age: 75
End: 2025-04-14
Payer: MEDICARE

## 2025-04-14 VITALS
HEIGHT: 71 IN | SYSTOLIC BLOOD PRESSURE: 176 MMHG | WEIGHT: 199 LBS | TEMPERATURE: 98 F | HEART RATE: 57 BPM | BODY MASS INDEX: 27.86 KG/M2 | OXYGEN SATURATION: 98 % | RESPIRATION RATE: 19 BRPM | DIASTOLIC BLOOD PRESSURE: 78 MMHG

## 2025-04-14 DIAGNOSIS — D12.4 ADENOMATOUS POLYP OF DESCENDING COLON: ICD-10-CM

## 2025-04-14 DIAGNOSIS — D12.2 ADENOMATOUS POLYP OF ASCENDING COLON: ICD-10-CM

## 2025-04-14 DIAGNOSIS — Z12.11 SCREENING FOR MALIGNANT NEOPLASM OF COLON: Primary | ICD-10-CM

## 2025-04-14 DIAGNOSIS — D12.3 ADENOMATOUS POLYP OF TRANSVERSE COLON: ICD-10-CM

## 2025-04-14 DIAGNOSIS — K62.1 RECTAL POLYP: ICD-10-CM

## 2025-04-14 DIAGNOSIS — Z86.0100 HISTORY OF COLON POLYPS: ICD-10-CM

## 2025-04-14 LAB — GLUCOSE SERPL-MCNC: 85 MG/DL (ref 70–105)

## 2025-04-14 PROCEDURE — 37000009 HC ANESTHESIA EA ADD 15 MINS

## 2025-04-14 PROCEDURE — 37000008 HC ANESTHESIA 1ST 15 MINUTES

## 2025-04-14 PROCEDURE — 27000284 HC CANNULA NASAL: Performed by: NURSE ANESTHETIST, CERTIFIED REGISTERED

## 2025-04-14 PROCEDURE — D9220A PRA ANESTHESIA: Mod: PT,,, | Performed by: NURSE ANESTHETIST, CERTIFIED REGISTERED

## 2025-04-14 PROCEDURE — 45380 COLONOSCOPY AND BIOPSY: CPT | Mod: PT,,, | Performed by: INTERNAL MEDICINE

## 2025-04-14 PROCEDURE — 88305 TISSUE EXAM BY PATHOLOGIST: CPT | Mod: TC,SUR | Performed by: INTERNAL MEDICINE

## 2025-04-14 PROCEDURE — 45380 COLONOSCOPY AND BIOPSY: CPT | Mod: PT | Performed by: INTERNAL MEDICINE

## 2025-04-14 PROCEDURE — 63600175 PHARM REV CODE 636 W HCPCS: Performed by: NURSE ANESTHETIST, CERTIFIED REGISTERED

## 2025-04-14 PROCEDURE — 27201423 OPTIME MED/SURG SUP & DEVICES STERILE SUPPLY

## 2025-04-14 PROCEDURE — 82962 GLUCOSE BLOOD TEST: CPT

## 2025-04-14 RX ORDER — PROPOFOL 10 MG/ML
VIAL (ML) INTRAVENOUS
Status: DISCONTINUED | OUTPATIENT
Start: 2025-04-14 | End: 2025-04-14

## 2025-04-14 RX ORDER — LIDOCAINE HYDROCHLORIDE 20 MG/ML
INJECTION, SOLUTION EPIDURAL; INFILTRATION; INTRACAUDAL; PERINEURAL
Status: DISCONTINUED | OUTPATIENT
Start: 2025-04-14 | End: 2025-04-14

## 2025-04-14 RX ORDER — SODIUM CHLORIDE, SODIUM LACTATE, POTASSIUM CHLORIDE, CALCIUM CHLORIDE 600; 310; 30; 20 MG/100ML; MG/100ML; MG/100ML; MG/100ML
INJECTION, SOLUTION INTRAVENOUS CONTINUOUS
Status: DISCONTINUED | OUTPATIENT
Start: 2025-04-14 | End: 2025-04-15 | Stop reason: HOSPADM

## 2025-04-14 RX ORDER — SODIUM CHLORIDE 0.9 % (FLUSH) 0.9 %
10 SYRINGE (ML) INJECTION EVERY 6 HOURS PRN
Status: DISCONTINUED | OUTPATIENT
Start: 2025-04-14 | End: 2025-04-15 | Stop reason: HOSPADM

## 2025-04-14 RX ADMIN — PROPOFOL 30 MG: 10 INJECTION, EMULSION INTRAVENOUS at 02:04

## 2025-04-14 RX ADMIN — LIDOCAINE HYDROCHLORIDE 100 MG: 20 INJECTION, SOLUTION EPIDURAL; INFILTRATION; INTRACAUDAL; PERINEURAL at 02:04

## 2025-04-14 RX ADMIN — PROPOFOL 100 MG: 10 INJECTION, EMULSION INTRAVENOUS at 02:04

## 2025-04-14 RX ADMIN — PROPOFOL 15 MG: 10 INJECTION, EMULSION INTRAVENOUS at 02:04

## 2025-04-14 NOTE — ANESTHESIA PREPROCEDURE EVALUATION
04/14/2025  Sami Kerns is a 75 y.o., male.    Past Medical History:   Diagnosis Date    Adenomatous polyp of ascending colon 11/29/2021    Adenomatous polyp of descending colon 11/29/2021    Adenomatous polyp of transverse colon 11/29/2021    Anticoagulant long-term use     Blood in stool 11/29/2021    CAD (coronary artery disease)     CHF (congestive heart failure)     COPD (chronic obstructive pulmonary disease)     SOB with walking distances 10/2022    GERD (gastroesophageal reflux disease)     Gout     HH (hiatus hernia) 05/16/2022    HTN (hypertension)     Hyperlipidemia     Iron deficiency anemia due to chronic blood loss 11/29/2021    follows with : Anemia, Monoclonal Gammopathy    Orchitis 01/20/2022    Osteoarthritis     Polyp of splenic flexure of colon 11/29/2021    Screening for colon cancer 11/29/2021    Urinary incontinence 01/20/2022    Vocal cord polyps     followed by  2022       Past Surgical History:   Procedure Laterality Date    CORONARY ARTERY BYPASS GRAFT (CABG)      DIRECT LARYNGOSCOPY Bilateral 05/10/2022    Procedure: LARYNGOSCOPY, DIRECT;  Surgeon: Jesse Smalls MD;  Location: [facility];  Service: ENT;  Laterality: Bilateral;    DIRECT LARYNGOSCOPY N/A 11/11/2022    Procedure: LARYNGOSCOPY, DIRECT;  Surgeon: Jesse Smalls MD;  Location: [facility];  Service: ENT;  Laterality: N/A;    DIRECT LARYNGOSCOPY N/A 8/15/2023    Procedure: LARYNGOSCOPY, DIRECT;  Surgeon: Jesse Smalls MD;  Location: [facility] OR;  Service: ENT;  Laterality: N/A;    TONSILLECTOMY N/A     at age 18    VOCAL FOLD LESION EXCISION Bilateral 05/10/2022    Procedure: EXCISION, LESION, VOCAL CORD;  Surgeon: Jesse Smalls MD;  Location: [facility];  Service: ENT;  Laterality: Bilateral;    VOCAL FOLD LESION EXCISION N/A 11/11/2022    Procedure: EXCISION, LESION, VOCAL  CORD POLYP;  Surgeon: Jesse Smalls MD;  Location: [facility] OR;  Service: ENT;  Laterality: N/A;    VOCAL FOLD LESION EXCISION Right 8/15/2023    Procedure: EXCISION, LESION, VOCAL CORD;  Surgeon: Jesse Smalls MD;  Location: [facility] OR;  Service: ENT;  Laterality: Right;       Family History   Problem Relation Name Age of Onset    Hypertension Mother         Social History     Socioeconomic History    Marital status:    Occupational History    Occupation: retired   Tobacco Use    Smoking status: Some Days     Current packs/day: 0.50     Average packs/day: 0.5 packs/day for 56.0 years (28.0 ttl pk-yrs)     Types: Cigarettes     Passive exposure: Current    Smokeless tobacco: Current    Tobacco comments:     Smokes 3 cigarettes daily   Substance and Sexual Activity    Alcohol use: Yes     Alcohol/week: 2.0 standard drinks of alcohol     Types: 2 Cans of beer per week     Comment: occasionally    Drug use: Never    Sexual activity: Not Currently     Social Drivers of Health     Financial Resource Strain: Low Risk  (4/8/2024)    Overall Financial Resource Strain (CARDIA)     Difficulty of Paying Living Expenses: Not hard at all   Food Insecurity: No Food Insecurity (4/8/2024)    Hunger Vital Sign     Worried About Running Out of Food in the Last Year: Never true     Ran Out of Food in the Last Year: Never true   Transportation Needs: No Transportation Needs (4/8/2024)    PRAPARE - Transportation     Lack of Transportation (Medical): No     Lack of Transportation (Non-Medical): No   Physical Activity: Inactive (4/8/2024)    Exercise Vital Sign     Days of Exercise per Week: 0 days     Minutes of Exercise per Session: 0 min   Stress: No Stress Concern Present (4/8/2024)    [employer] - Occupational Stress Questionnaire     Feeling of Stress : Not at all   Housing Stability: Low Risk  (4/8/2024)    Housing Stability Vital Sign     Unable to Pay for Housing in the Last  Year: No     Number of Places Lived in the Last Year: 1     Unstable Housing in the Last Year: No       Current Medications[1]    Review of patient's allergies indicates:  No Known Allergies     Pre-op Assessment    I have reviewed the Patient Summary Reports.     I have reviewed the Nursing Notes. I have reviewed the NPO Status.   I have reviewed the Medications.     Review of Systems  Anesthesia Hx:  No problems with previous Anesthesia                Cardiovascular:     Hypertension   CAD      Angina CHF                                   Pulmonary:   COPD                     Renal/:  Chronic Renal Disease                Hepatic/GI:    Hiatal Hernia, GERD                Musculoskeletal:  Arthritis               Neurological:    Neuromuscular Disease,                                   Endocrine:  Diabetes               Physical Exam    Airway:  Mallampati: II   Mouth Opening: Normal  TM Distance: Normal  Tongue: Normal  Neck ROM: Normal ROM        Anesthesia Plan  Type of Anesthesia, risks & benefits discussed:    Anesthesia Type: Gen Natural Airway, MAC  Intra-op Monitoring Plan: Standard ASA Monitors  Post Op Pain Control Plan: multimodal analgesia  Induction:  IV  Informed Consent: Informed consent signed with the Patient and all parties understand the risks and agree with anesthesia plan.  All questions answered. Patient consented to blood products? Yes  ASA Score: 4  Day of Surgery Review of History & Physical: H&P Update referred to the surgeon/provider.I have interviewed and examined the patient. I have reviewed the patient's H&P dated:     Ready For Surgery From Anesthesia Perspective.     .           [1]   Current Outpatient Medications   Medication Sig Dispense Refill    albuterol (PROVENTIL/VENTOLIN HFA) 90 mcg/actuation inhaler INHALE 2 PUFFS BY MOUTH EVERY 6 HOURS AS NEEDED FOR WHEEZING 18 g 5    allopurinoL (ZYLOPRIM) 100 MG tablet Take 2 tablets (200 mg total) by mouth once daily. 90 tablet 1     amiodarone (PACERONE) 200 MG Tab Take by mouth once daily.      ascorbic acid, vitamin C, (VITAMIN C) 1000 MG tablet Take 1,000 mg by mouth once daily.      aspirin (ECOTRIN) 81 MG EC tablet Take 81 mg by mouth once daily.      atorvastatin (LIPITOR) 80 MG tablet Take 1 tablet by mouth once daily.      blood sugar diagnostic Strp 1 each by Misc.(Non-Drug; Combo Route) route 2 (two) times a day. 200 each 1    blood-glucose meter kit Use as instructed 1 each 0    cilostazoL (PLETAL) 100 MG Tab Take 100 mg by mouth 2 (two) times daily.      clopidogreL (PLAVIX) 75 mg tablet Take 1 tablet (75 mg total) by mouth once daily. 90 tablet 1    ergocalciferol (ERGOCALCIFEROL) 50,000 unit Cap Take 1 capsule (50,000 Units total) by mouth every 7 days. for 12 doses 12 capsule 0    ezetimibe (ZETIA) 10 mg tablet Take 10 mg by mouth once daily.      furosemide (LASIX) 40 MG tablet Take 40 mg by mouth once daily.      hydrALAZINE (APRESOLINE) 50 MG tablet Take 1 tablet (50 mg total) by mouth 3 (three) times daily. 90 tablet 1    indomethacin (INDOCIN) 25 MG capsule Take 1 capsule (25 mg total) by mouth 2 (two) times daily as needed (for pain). 20 capsule 0    isosorbide mononitrate (IMDUR) 30 MG 24 hr tablet Take 15 mg by mouth.      lancets (LANCETS,THIN) Misc 1 each by Misc.(Non-Drug; Combo Route) route 2 (two) times daily. 200 each 1    latanoprost 0.005 % ophthalmic solution Place 1 drop into both eyes every evening.      losartan (COZAAR) 50 MG tablet Take 2 tablets (100 mg total) by mouth once daily. 90 tablet 1    metFORMIN (GLUCOPHAGE) 500 MG tablet Take 1 tablet (500 mg total) by mouth 2 (two) times daily with meals. 180 tablet 1    metoprolol succinate (TOPROL-XL) 50 MG 24 hr tablet Take 1 tablet by mouth once daily.      nitroGLYCERIN (NITROSTAT) 0.4 MG SL tablet Place under the tongue.      tamsulosin (FLOMAX) 0.4 mg Cap TAKE 1 CAPSULE BY MOUTH EVERY DAY 90 capsule 3    timolol maleate 0.5% (TIMOPTIC) 0.5 % Drop Place  into both eyes.      traMADoL (ULTRAM) 50 mg tablet Take 1 tablet (50 mg total) by mouth every 6 (six) hours as needed for Pain. 12 tablet 0    valsartan (DIOVAN) 320 MG tablet Take 320 mg by mouth.       No current facility-administered medications for this encounter.     Facility-Administered Medications Ordered in Other Encounters   Medication Dose Route Frequency Provider Last Rate Last Admin    0.9%  NaCl infusion   Intravenous Continuous Jesse Smalls MD 50 mL/hr at 08/15/23 0714 New Bag at 08/15/23 0714

## 2025-04-14 NOTE — DISCHARGE INSTRUCTIONS
Procedure Date  4/14/25     Impression  Overall Impression:   2 polyps in the rectum; performed cold forceps biopsy  Digital rectal exam revealed no mass.  The colon was examined from the rectum to the cecum and 2 diminutive erythematous polyps were removed with biopsy from the rectum.  Mild erythema was also noted in the sigmoid colon.  No other polyps were seen.     Recommendation  Await pathology results, due: 4/16/2025   Repeat colonoscopy in 5 years      Disposition: Discharge patient to home in stable condition.  High-fiber diet.  No driving until tomorrow.  Follow up with PCP as scheduled, return to GI clinic p.r.n..  The patient may resume anticoagulation therapy tomorrow.  Consider repeat colonoscopy in 5 years if the patient is in good health at that time.  NO DRIVING, OPERATING EQUIPMENT, OR SIGNING LEGAL DOCUMENTS FOR 24 HOURS.

## 2025-04-14 NOTE — TRANSFER OF CARE
"Anesthesia Transfer of Care Note    Patient: Sami Kerns    Procedure(s) Performed: Colonoscopy    Patient location: GI    Anesthesia Type: general    Transport from OR: Transported from OR on room air with adequate spontaneous ventilation. Continuous ECG monitoring in transport. Continuous SpO2 monitoring in transport    Post pain: adequate analgesia    Post assessment: no apparent anesthetic complications and tolerated procedure well    Post vital signs: stable    Level of consciousness: responds to stimulation and sedated    Nausea/Vomiting: no nausea/vomiting    Complications: none    Transfer of care protocol was followed      Last vitals: Visit Vitals  BP (!) 190/77 (BP Location: Right arm, Patient Position: Lying)   Pulse (!) 48   Temp 36.7 °C (98.1 °F)   Resp 14   Ht 5' 11" (1.803 m)   Wt 90.3 kg (199 lb)   SpO2 99%   BMI 27.75 kg/m²     "

## 2025-04-14 NOTE — ANESTHESIA POSTPROCEDURE EVALUATION
Anesthesia Post Evaluation    Patient: Sami Kerns    Procedure(s) Performed: Colonoscopy    Final Anesthesia Type: general      Patient location during evaluation: GI PACU  Patient participation: Yes- Able to Participate  Level of consciousness: awake and alert  Post-procedure vital signs: reviewed and stable  Pain management: adequate  Airway patency: patent  RAULITO mitigation strategies: Multimodal analgesia  PONV status at discharge: No PONV  Anesthetic complications: no      Cardiovascular status: stable  Respiratory status: unassisted  Hydration status: euvolemic  Follow-up not needed.              Vitals Value Taken Time   /83 04/14/25 15:21   Temp  04/14/25 16:11   Pulse 54 04/14/25 15:21   Resp 17 04/14/25 15:21   SpO2 97 % 04/14/25 15:21   Vitals shown include unfiled device data.      Event Time   Out of Recovery 15:22:23         Pain/Nicholas Score: Nicholas Score: 10 (4/14/2025  3:03 PM)

## 2025-04-14 NOTE — H&P
[facility] - Endoscopy  Gastroenterology  H&P    Patient Name: Sami Kerns  MRN: [id number]  Admission Date: 4/14/2025  Code Status: Prior    Attending Provider: Pauline Schaeffer FNP   Primary Care Physician: Michael Weems MD  Principal Problem:<principal problem not specified>    Subjective:     History of Present Illness:  This patient is a 75-year-old man with history of colon polyps.  His last colonoscopy was in 2021.    Past Medical History:   Diagnosis Date    Adenomatous polyp of ascending colon 11/29/2021    Adenomatous polyp of descending colon 11/29/2021    Adenomatous polyp of transverse colon 11/29/2021    Anticoagulant long-term use     Blood in stool 11/29/2021    CAD (coronary artery disease)     CHF (congestive heart failure)     COPD (chronic obstructive pulmonary disease)     SOB with walking distances 10/2022    GERD (gastroesophageal reflux disease)     Gout     HH (hiatus hernia) 05/16/2022    HTN (hypertension)     Hyperlipidemia     Iron deficiency anemia due to chronic blood loss 11/29/2021    follows with : Anemia, Monoclonal Gammopathy    Orchitis 01/20/2022    Osteoarthritis     Polyp of splenic flexure of colon 11/29/2021    Screening for colon cancer 11/29/2021    Urinary incontinence 01/20/2022    Vocal cord polyps     followed by  2022       Past Surgical History:   Procedure Laterality Date    CORONARY ARTERY BYPASS GRAFT (CABG)      DIRECT LARYNGOSCOPY Bilateral 05/10/2022    Procedure: LARYNGOSCOPY, DIRECT;  Surgeon: Jesse Smalls MD;  Location: [facility];  Service: ENT;  Laterality: Bilateral;    DIRECT LARYNGOSCOPY N/A 11/11/2022    Procedure: LARYNGOSCOPY, DIRECT;  Surgeon: Jesse Smalls MD;  Location: [facility] OR;  Service: ENT;  Laterality: N/A;    DIRECT LARYNGOSCOPY N/A 8/15/2023    Procedure: LARYNGOSCOPY, DIRECT;  Surgeon: Jesse Smalls MD;  Location: [facility] OR;  Service: ENT;  Laterality: N/A;    TONSILLECTOMY N/A     at  age 18    VOCAL FOLD LESION EXCISION Bilateral 05/10/2022    Procedure: EXCISION, LESION, VOCAL CORD;  Surgeon: Jesse Smalls MD;  Location: [facility] OR;  Service: ENT;  Laterality: Bilateral;    VOCAL FOLD LESION EXCISION N/A 11/11/2022    Procedure: EXCISION, LESION, VOCAL CORD POLYP;  Surgeon: Jesse Smalls MD;  Location: [facility] OR;  Service: ENT;  Laterality: N/A;    VOCAL FOLD LESION EXCISION Right 8/15/2023    Procedure: EXCISION, LESION, VOCAL CORD;  Surgeon: Jesse Smalls MD;  Location: [facility] OR;  Service: ENT;  Laterality: Right;       Review of patient's allergies indicates:  No Known Allergies  Family History       Problem Relation (Age of Onset)    Hypertension Mother          Tobacco Use    Smoking status: Some Days     Current packs/day: 0.50     Average packs/day: 0.5 packs/day for 56.0 years (28.0 ttl pk-yrs)     Types: Cigarettes     Passive exposure: Current    Smokeless tobacco: Current    Tobacco comments:     Smokes 3 cigarettes daily   Substance and Sexual Activity    Alcohol use: Yes     Alcohol/week: 2.0 standard drinks of alcohol     Types: 2 Cans of beer per week     Comment: occasionally    Drug use: Never    Sexual activity: Not Currently     Review of Systems   Constitutional: Negative.    HENT: Negative.     Respiratory: Negative.     Cardiovascular: Negative.    Gastrointestinal: Negative.      Objective:     Vital Signs (Most Recent):  Temp: 98.1 °F (36.7 °C) (04/14/25 1351)  Pulse: (!) 48 (04/14/25 1351)  Resp: 14 (04/14/25 1351)  BP: (!) 190/77 (04/14/25 1351)  SpO2: 99 % (04/14/25 1351) Vital Signs (24h Range):  Temp:  [98.1 °F (36.7 °C)] 98.1 °F (36.7 °C)  Pulse:  [48] 48  Resp:  [14] 14  SpO2:  [99 %] 99 %  BP: (190)/(77) 190/77     Weight: 90.3 kg (199 lb) (04/14/25 1351)  Body mass index is 27.75 kg/m².    No intake or output data in the 24 hours ending 04/14/25 1413    Lines/Drains/Airways       Peripheral Intravenous Line  Duration                   "Peripheral IV - Single Lumen 04/14/25 1348 22 G Right Hand <1 day                    Physical Exam  Vitals reviewed.   Constitutional:       General: He is not in acute distress.     Appearance: Normal appearance. He is well-developed. He is not ill-appearing.   HENT:      Head: Normocephalic and atraumatic.      Nose: Nose normal.   Eyes:      Pupils: Pupils are equal, round, and reactive to light.   Cardiovascular:      Rate and Rhythm: Normal rate and regular rhythm.   Pulmonary:      Effort: Pulmonary effort is normal.      Breath sounds: Normal breath sounds. No wheezing.   Abdominal:      General: Abdomen is flat. Bowel sounds are normal. There is no distension.      Palpations: Abdomen is soft.      Tenderness: There is no abdominal tenderness. There is no guarding.   Skin:     General: Skin is warm and dry.      Coloration: Skin is not jaundiced.   Neurological:      Mental Status: He is alert.   Psychiatric:         Attention and Perception: Attention normal.         Mood and Affect: Affect normal.         Speech: Speech normal.         Behavior: Behavior is cooperative.      Comments: Pt was calm while speaking.         Significant Labs:  CBC: No results for input(s): "WBC", "HGB", "HCT", "PLT" in the last 48 hours.  CMP: No results for input(s): "GLU", "CALCIUM", "ALBUMIN", "PROT", "NA", "K", "CO2", "CL", "BUN", "CREATININE", "ALKPHOS", "ALT", "AST", "BILITOT" in the last 48 hours.    Significant Imaging:  Imaging results within the past 24 hours have been reviewed.    Assessment/Plan:     There are no hospital problems to display for this patient.        Impression:  History of colon polyps  Plan: Surveillance colonoscopy today.    Johnathon Zambrano MD  Gastroenterology  [facility] - Endoscopy  "

## 2025-04-15 ENCOUNTER — RESULTS FOLLOW-UP (OUTPATIENT)
Dept: GASTROENTEROLOGY | Facility: CLINIC | Age: 75
End: 2025-04-15

## 2025-04-15 NOTE — PROGRESS NOTES
These colon polyps were hyperplastic.  Recommendation: Repeat colonoscopy in 5 years for history of colon polyps if patient is in good condition at that time.

## 2025-04-17 ENCOUNTER — TELEPHONE (OUTPATIENT)
Dept: GASTROENTEROLOGY | Facility: CLINIC | Age: 75
End: 2025-04-17
Payer: MEDICARE

## 2025-04-17 NOTE — TELEPHONE ENCOUNTER
Results and recommendations called to patient. Verbalized understanding.            ----- Message from Johnathon Zambrano MD sent at 4/15/2025  2:59 PM CDT -----  These colon polyps were hyperplastic.  Recommendation: Repeat colonoscopy in 5 years for history of colon polyps if patient is in good condition at that time.  ----- Message -----  From: [place], Lab In [facility]  Sent: 4/14/2025   2:04 PM CDT  To: Johnathon Zambrano MD

## 2025-04-28 ENCOUNTER — CLINICAL SUPPORT (OUTPATIENT)
Dept: DIABETES | Facility: CLINIC | Age: 75
End: 2025-04-28
Payer: MEDICARE

## 2025-04-28 VITALS — WEIGHT: 194 LBS | HEIGHT: 71 IN | BODY MASS INDEX: 27.16 KG/M2

## 2025-04-28 DIAGNOSIS — E11.9 TYPE 2 DIABETES MELLITUS WITHOUT COMPLICATION, WITHOUT LONG-TERM CURRENT USE OF INSULIN: ICD-10-CM

## 2025-04-28 PROCEDURE — 99999PBSHW PR PBB SHADOW TECHNICAL ONLY FILED TO HB: Mod: PBBFAC,,,

## 2025-04-28 PROCEDURE — G0108 DIAB MANAGE TRN  PER INDIV: HCPCS | Mod: PBBFAC | Performed by: INTERNAL MEDICINE

## 2025-04-28 PROCEDURE — 99213 OFFICE O/P EST LOW 20 MIN: CPT | Mod: PBBFAC

## 2025-04-28 PROCEDURE — 99999 PR PBB SHADOW E&M-EST. PATIENT-LVL III: CPT | Mod: PBBFAC,,,

## 2025-04-28 NOTE — LETTER
"     April 28, 2025      Michael Weems MD  [address] [facility] [place] [address]         Patient: Sami Kerns   MR Number: [id number]   YOB: 1950   Date of Visit: 4/28/2025       Dear Dr. Weems:    Thank you for referring Sami for diabetes self-management education and support services. He was seen today for an initial visit. He set the goals below to manage his diabetes better and has a follow up visit scheduled with me on 5/21/2025.     Patient Outcomes:    A1c Status: No results found for: "HGBA1C"    Care Plan: Diabetes Management        Problem: Healthy Eating         Goal: Eat 3 meals daily with 45-60g (3-4) servings of Carbohydrate per meal.    Start Date: 4/28/2025   Expected End Date: 5/21/2025   Barriers: No Barriers Identified   Note:    Reviewed the sources and role of Carbohydrate, Protein, and Fat and how each nutrient impact blood sugar. Provided meal-planning instruction via food lists, plate method, food models, food labels. Reviewed micro/macronutrient effect on health management. Discussed carbohydrate counting and spacing. Reviewed principles of energy metabolism to assist weight and health management. Discussed strategies for healthier dining out and replacing sugary beverages with water and other noncaloric, sugar free options. (Several handouts provided:  Planning a healthy meal, Building a Balanced Meal, Heart-Healthy Consistent Carbohydrate Nutrition Therapy, Weight loss)       Task: Review the importance of balancing carbohydrates with each meal using portion control techniques to count servings of carbohydrate and label reading to identify serving size and amount of total carbs per serving. Completed 4/28/2025        Task: Provided Sample plate method and reviewed the use of the plate to estimate amounts of carbohydrate per meal. Completed 4/28/2025        Problem: Physical Activity and Exercise         Goal: Patient agrees to increase physical activity to a goal of " 5 times per week for 30 minutes.    Start Date: 4/28/2025   Expected End Date: 5/21/2025   Barriers: No Barriers Identified   Note:    Discussed importance of daily physical activity with review of benefits, methods, guidelines, and precautions. Discussed the recommended ADA guidelines of 30 minutes 5 days a week and not missing >2 days without activity.  (Suggested increasing activity 10 minutes 3 x day as tolerated.) Discussed role of physical activity on reducing insulin resistance, improvement in overall glycemic control, and as it relates to weight loss. A handout on Diabetes and Physical Activity provided.       Problem: Blood Glucose Self-Monitoring         Goal: Goal: Patient agrees to check and record blood sugars randomly AC and 2 hours PP (1-2 times ) per day.    Start Date: 4/28/2025   Expected End Date: 5/21/2025   Barriers: No Barriers Identified   Note:    Reviewed benefits, techniques of self-monitoring blood glucose. Discussed appropriate timing and frequency to SMBG, education on parameters on when to notify provider. Discussed monitoring blood sugar before and 2 hours after the start of a meal helps to see how food and other factors affects blood sugar and advised patient to bring logs to all appts with PCP/Endocrinologist/Diabetes Care Specialist.  Handouts provided on Factors Affecting Blood sugar, Checking Your Blood Sugar, All About Blood Glucose and glucose log to record and bring on next follow up visit.         Task: Reviewed the importance of self-monitoring blood glucose and keeping logs. Completed 4/28/2025        Follow up:   Sami to attend medical appointments as scheduled  Sami to update you on his DM education progress as needed      If you have questions, please do not hesitate to call me. I look forward to providing additional education and support as needed.    Sincerely,  Felicia Spangler, MELISSA  Diabetes Care and

## 2025-04-28 NOTE — PROGRESS NOTES
"Diabetes Care Specialist Progress Note  Author: FLORES CHURCH RN  Date: 4/28/2025    Intake    Program Intake  Reason for Diabetes Program Visit:: Initial Diabetes Assessment  Current diabetes risk level::  (new dx)  In the last month, have you used the ER or been admitted to the hospital: No  Permission to speak with others about care:: no    Current Diabetes Treatment: Oral Medications  Oral Medication Type/Dose: metFORMIN (GLUCOPHAGE) 500 MG tablet:1 tablet (500 mg total) by mouth 2 (two) times daily with meals. - Oral    Continuous Glucose Monitoring  Patient has CGM: No    No results found for: "LABA1C", "HGBA1C"      Referred to Diabetes Care Specialist by Dr. Michael Weems for a new diagnosis of diabetes type 2.  He had lots of questions and participated in the visit.      Weight: 88 kg (194 lb)   Height: 5' 11" (180.3 cm)   Body mass index is 27.06 kg/m².    Lifestyle Coping Support & Clinical    Lifestyle/Coping/Support  Psychosocial/Coping Skills Assessment Completed: : No  Deffered due to:: Time  Area of need?: Deferred    Problem Review  Active Comorbidities: Chronic Kidney Disease, Hyperlipidemia/Dyslipidemia, Hypertension    Diabetes Self-Management Skills Assessment    Medication Skills Assessment  Patient is able to identify current diabetes medications, dosages, and appropriate timing of medications.: yes  Patient reports problems or concerns with current medication regimen.: no  Patient is  aware that some diabetes medications can cause low blood sugar?: Yes  Medication Skills Assessment Completed:: Yes  Assessment indicates:: Instruction Needed  Area of need?: Yes    Diabetes Disease Process/Treatment Options  Diabetes Type?: Type II  When were you diagnosed?: New diagnosis  What are your goals for this education session?: Learn what to eat  Is patient aware of what causes diabetes?: No  Does patient understand the pathophysiology of diabetes?: No  Diabetes Disease Process/Treatment Options: Skills " Assessment Completed: Yes  Assessment indicates:: Knowledge deficit, Instruction Needed  Area of need?: Yes    Nutrition/Healthy Eating  Meal Plan 24 Hour Recall - Breakfast: Eats at home: banana and 2 boiled eggs (cash saver: biscuit, 1 sausage zuri, grits)  water  Meal Plan 24 Hour Recall - Lunch: barbecue chicken wings, greens spoon full, string beans  Meal Plan 24 Hour Recall - Dinner: peanut butter bread (wheat bread)  Meal Plan 24 Hour Recall - Snack: popcorn microwave  Meal Plan 24 Hour Recall - Beverage: 6 bottles water day  Who shops/cooks?: shops  Patient can identify foods that impact blood sugar.: no (see comments)  Challenges to healthy eating:: other (see comments) (Eats out every meal. States the only thing he cooks is 2 boiled egg in the morning.)  Nutrition/Healthy Eating Skills Assessment Completed:: Yes  Assessment indicates:: Knowledge deficit, Instruction Needed  Area of need?: Yes    Physical Activity/Exercise  Patient's daily activity level:: lightly active  Physical Activity/Exercise Skills Assessment Completed: : Yes  Assessment indicates:: Instruction Needed, Knowledge deficit  Area of need?: Yes    Home Blood Glucose Monitoring  Patient states that blood sugar is checked at home daily.: yes  Monitoring Method:: home glucometer  Home Blood Glucose Monitoring Skills Assessment Completed: : Yes  Assessment indicates:: Knowledge deficit, Instruction Needed  Area of need?: Yes    Acute Complications  Acute Complications Skills Assessment Completed: : No  Deffered due to:: Time  Area of need?: Deferred    Chronic Complications  Chronic Complications Skills Assessment Completed: : No  Deferred due to:: Time  Area of need?: Deferred      Assessment Summary and Plan    Based on today's diabetes care assessment, the following areas of need were identified:      Identified Areas of Need      Medication/Current Diabetes Treatment: Yes  - Provided review of current diabetes medication (Metformin)  action, dosage, frequency, and side effects. Discussed guidelines for preventing, detecting, and treating hypoglycemia and hyperglycemia. Reviewed the importance of meal and medication timing with diabetes mediations for prevention of acute complications and maximum drug benefit.  Discussed calling HCP if not tolerating medication or if blood sugar is uncontrolled with current regimen. Handout provided on current medication and Safety with Medications.     Lifestyle Coping/Support: Deferred - Time     Diabetes Disease Process/Treatment Options: Yes  - Reviewed diabetes pathophysiology, different types of diabetes, signs and symptoms, risk factors, progression, and treatment guidelines. Emphasized on the importance of controlling diabetes to prevent complications and how diabetes can successfully manage. Handout Provided on Understanding Type 2 Diabetes and Management of diabetes. (The A, B, C's)      Nutrition/Healthy Eating: Yes - See Care Plan     Physical Activity/Exercise: Yes - See Care Plan     Home Blood Glucose Monitoring: Yes - See Care Plan     Acute Complications: Deferred - Time     Chronic Complications: Deferred - Time       Today's interventions were provided through individual discussion, instruction, and written materials were provided.      Patient verbalized understanding of instruction and written materials.  Pt was able to return back demonstration of instructions today. Patient understood key points, needs reinforcement and further instruction.     Diabetes Self-Management Care Plan:    Today's Diabetes Self-Management Care Plan was developed with Sami's input. Sami has agreed to work toward the following goal(s) to improve his/her overall diabetes control.      Care Plan: Diabetes Management        Problem: Healthy Eating         Goal: Eat 3 meals daily with 45-60g (3-4) servings of Carbohydrate per meal.    Start Date: 4/28/2025   Expected End Date: 5/21/2025   Barriers: No Barriers  Identified   Note:    Reviewed the sources and role of Carbohydrate, Protein, and Fat and how each nutrient impact blood sugar. Provided meal-planning instruction via food lists, plate method, food models, food labels. Reviewed micro/macronutrient effect on health management. Discussed carbohydrate counting and spacing. Reviewed principles of energy metabolism to assist weight and health management. Discussed strategies for healthier dining out and replacing sugary beverages with water and other noncaloric, sugar free options. (Several handouts provided:  Planning a healthy meal, Building a Balanced Meal, Heart-Healthy Consistent Carbohydrate Nutrition Therapy, Weight loss)       Task: Review the importance of balancing carbohydrates with each meal using portion control techniques to count servings of carbohydrate and label reading to identify serving size and amount of total carbs per serving. Completed 4/28/2025        Task: Provided Sample plate method and reviewed the use of the plate to estimate amounts of carbohydrate per meal. Completed 4/28/2025        Problem: Physical Activity and Exercise         Goal: Patient agrees to increase physical activity to a goal of 5 times per week for 30 minutes.    Start Date: 4/28/2025   Expected End Date: 5/21/2025   Barriers: No Barriers Identified   Note:    Discussed importance of daily physical activity with review of benefits, methods, guidelines, and precautions. Discussed the recommended ADA guidelines of 30 minutes 5 days a week and not missing >2 days without activity.  (Suggested increasing activity 10 minutes 3 x day as tolerated.) Discussed role of physical activity on reducing insulin resistance, improvement in overall glycemic control, and as it relates to weight loss. A handout on Diabetes and Physical Activity provided.         Problem: Blood Glucose Self-Monitoring         Goal: Patient agrees to check and record blood sugars randomly AC and 2 hours PP (1-2  times ) per day.    Start Date: 4/28/2025   Expected End Date: 5/21/2025   Barriers: No Barriers Identified   Note:    Reviewed benefits, techniques of self-monitoring blood glucose. Discussed appropriate timing and frequency to SMBG, education on parameters on when to notify provider. Discussed monitoring blood sugar before and 2 hours after the start of a meal helps to see how food and other factors affects blood sugar and advised patient to bring logs to all appts with PCP/Endocrinologist/Diabetes Care Specialist.  Handouts provided on Factors Affecting Blood sugar, Checking Your Blood Sugar, All About Blood Glucose and glucose log to record and bring on next follow up visit.     Task: Reviewed the importance of self-monitoring blood glucose and keeping logs. Completed 4/28/2025              Follow Up Plan     Follow up in about 23 days (around 5/21/2025).    Today's care plan and follow up schedule was discussed with patient.  Sami verbalized understanding of the care plan, goals, and agrees to follow up plan.        The patient was encouraged to communicate with his/her health care provider/physician and care team regarding his/her condition(s) and treatment.  I provided the patient with my contact information today and encouraged to contact me via phone or [facility] Patient Portal as needed.     Length of Visit   Total Time: 90 Minutes

## 2025-04-30 ENCOUNTER — OFFICE VISIT (OUTPATIENT)
Dept: VASCULAR SURGERY | Facility: CLINIC | Age: 75
End: 2025-04-30
Payer: MEDICARE

## 2025-04-30 ENCOUNTER — HOSPITAL ENCOUNTER (OUTPATIENT)
Dept: RADIOLOGY | Facility: HOSPITAL | Age: 75
Discharge: HOME | End: 2025-04-30
Attending: NURSE PRACTITIONER
Payer: MEDICARE

## 2025-04-30 ENCOUNTER — EXTERNAL CHRONIC CARE MANAGEMENT (OUTPATIENT)
Dept: FAMILY MEDICINE | Facility: CLINIC | Age: 75
End: 2025-04-30
Payer: MEDICARE

## 2025-04-30 VITALS — HEIGHT: 71 IN | WEIGHT: 194 LBS | BODY MASS INDEX: 27.16 KG/M2

## 2025-04-30 DIAGNOSIS — I65.23 BILATERAL CAROTID ARTERY STENOSIS: ICD-10-CM

## 2025-04-30 DIAGNOSIS — I73.9 PVD (PERIPHERAL VASCULAR DISEASE): ICD-10-CM

## 2025-04-30 DIAGNOSIS — I73.9 PVD (PERIPHERAL VASCULAR DISEASE): Primary | ICD-10-CM

## 2025-04-30 DIAGNOSIS — F17.200 SMOKER: ICD-10-CM

## 2025-04-30 PROCEDURE — 3062F POS MACROALBUMINURIA REV: CPT | Mod: CPTII,,, | Performed by: NURSE PRACTITIONER

## 2025-04-30 PROCEDURE — 3008F BODY MASS INDEX DOCD: CPT | Mod: CPTII,,, | Performed by: NURSE PRACTITIONER

## 2025-04-30 PROCEDURE — 3288F FALL RISK ASSESSMENT DOCD: CPT | Mod: CPTII,,, | Performed by: NURSE PRACTITIONER

## 2025-04-30 PROCEDURE — 3066F NEPHROPATHY DOC TX: CPT | Mod: CPTII,,, | Performed by: NURSE PRACTITIONER

## 2025-04-30 PROCEDURE — 93922 UPR/L XTREMITY ART 2 LEVELS: CPT | Mod: TC

## 2025-04-30 PROCEDURE — 1159F MED LIST DOCD IN RCRD: CPT | Mod: CPTII,,, | Performed by: NURSE PRACTITIONER

## 2025-04-30 PROCEDURE — 99999 PR PBB SHADOW E&M-EST. PATIENT-LVL V: CPT | Mod: PBBFAC,,, | Performed by: NURSE PRACTITIONER

## 2025-04-30 PROCEDURE — 99215 OFFICE O/P EST HI 40 MIN: CPT | Mod: PBBFAC,25 | Performed by: NURSE PRACTITIONER

## 2025-04-30 PROCEDURE — 1101F PT FALLS ASSESS-DOCD LE1/YR: CPT | Mod: CPTII,,, | Performed by: NURSE PRACTITIONER

## 2025-04-30 PROCEDURE — 99214 OFFICE O/P EST MOD 30 MIN: CPT | Mod: S$PBB,,, | Performed by: NURSE PRACTITIONER

## 2025-04-30 PROCEDURE — 4010F ACE/ARB THERAPY RXD/TAKEN: CPT | Mod: CPTII,,, | Performed by: NURSE PRACTITIONER

## 2025-04-30 PROCEDURE — 99490 CHRNC CARE MGMT STAFF 1ST 20: CPT | Mod: ,,, | Performed by: INTERNAL MEDICINE

## 2025-04-30 PROCEDURE — 1160F RVW MEDS BY RX/DR IN RCRD: CPT | Mod: CPTII,,, | Performed by: NURSE PRACTITIONER

## 2025-04-30 PROCEDURE — 1126F AMNT PAIN NOTED NONE PRSNT: CPT | Mod: CPTII,,, | Performed by: NURSE PRACTITIONER

## 2025-04-30 NOTE — PROGRESS NOTES
Subjective:       Patient ID: Sami Kerns is a 75 y.o. male.    Chief Complaint: Peripheral Vascular Disease  Follow-up ABIs right severely decreased 0.47 doubling elevated velocity mid SFA at 141  with DPA occlusion, he does not really complain of claudication symptoms at rest or with ambulation no wounds no tissue loss moderately decreased MICHAEL on the left 0.65 Established patient with Dr. Thomas, he has not been seen by us since June of 2022 at that time Dr. Thomas was considering angio left lower extremity for claudication possible aortoiliac disease with decreased MICHAEL 0.59 once Hematology workup completed  History of iron deficiency anemia followed by Dr. Arenas no history malignancy  Has chest pain relief with nitroglycerin history of diabetes CABG he is followed by Dr. Melchor he smokes a pack of cigarettes every 3 days, he reports Dr. Melchor is treating CAD medically and not recommending heart catheterization due to impaired renal function  He recently changed insurances to [employer] in his to see Dr. Melchor next month instructed to go to the ER for increased chest pain shortness of breath or chest pain unrelieved by nitroglycerin,  arthritis pains in knees,  his main concern complaint is dyspnea palpitations with ambulation, his cardiologist is Dr. Melchor he has not seen him recently he denies rest pain, bilateral lower extremity pain, 1 leg does not hurt worse than the other    Past medical history includes CAD with CABG with bilateral lower extremity vein harvesting emphysema nondiabetic he does smoke      family history includes Diabetes in his sister and sister; Hypertension in his mother.  Past Medical History:   Diagnosis Date    Adenomatous polyp of ascending colon 11/29/2021    Adenomatous polyp of descending colon 11/29/2021    Adenomatous polyp of transverse colon 11/29/2021    Anticoagulant long-term use     Blood in stool 11/29/2021    CAD (coronary artery disease)     CHF (congestive  heart failure)     COPD (chronic obstructive pulmonary disease)     SOB with walking distances 10/2022    GERD (gastroesophageal reflux disease)     Gout     HH (hiatus hernia) 05/16/2022    HTN (hypertension)     Hyperlipidemia     Iron deficiency anemia due to chronic blood loss 11/29/2021    follows with : Anemia, Monoclonal Gammopathy    Orchitis 01/20/2022    Osteoarthritis     Polyp of splenic flexure of colon 11/29/2021    Screening for colon cancer 11/29/2021    Urinary incontinence 01/20/2022    Vocal cord polyps     followed by  2022      Past Surgical History:   Procedure Laterality Date    CORONARY ARTERY BYPASS GRAFT (CABG)      DIRECT LARYNGOSCOPY Bilateral 05/10/2022    Procedure: LARYNGOSCOPY, DIRECT;  Surgeon: Jesse Smalls MD;  Location: [facility];  Service: ENT;  Laterality: Bilateral;    DIRECT LARYNGOSCOPY N/A 11/11/2022    Procedure: LARYNGOSCOPY, DIRECT;  Surgeon: Jesse Smalls MD;  Location: [facility] OR;  Service: ENT;  Laterality: N/A;    DIRECT LARYNGOSCOPY N/A 8/15/2023    Procedure: LARYNGOSCOPY, DIRECT;  Surgeon: Jesse Smalls MD;  Location: [facility] OR;  Service: ENT;  Laterality: N/A;    TONSILLECTOMY N/A     at age 18    VOCAL FOLD LESION EXCISION Bilateral 05/10/2022    Procedure: EXCISION, LESION, VOCAL CORD;  Surgeon: Jesse Smalls MD;  Location: [facility] OR;  Service: ENT;  Laterality: Bilateral;    VOCAL FOLD LESION EXCISION N/A 11/11/2022    Procedure: EXCISION, LESION, VOCAL CORD POLYP;  Surgeon: Jesse Smalls MD;  Location: [facility] OR;  Service: ENT;  Laterality: N/A;    VOCAL FOLD LESION EXCISION Right 8/15/2023    Procedure: EXCISION, LESION, VOCAL CORD;  Surgeon: Jesse Smalls MD;  Location: [facility];  Service: ENT;  Laterality: Right;       reports that he has been smoking cigarettes. He has a 28 pack-year smoking history. He has been exposed to tobacco smoke. He uses smokeless tobacco. He reports current  "alcohol use of about 2.0 standard drinks of alcohol per week. He reports that he does not use drugs.   HPI  Review of Systems   Respiratory:  Positive for shortness of breath.    Cardiovascular:  Positive for palpitations and claudication. Negative for chest pain.   Neurological:  Positive for numbness.        Brief intermittent episode numbness right hand has resolved         Objective:      Ht 5' 11" (1.803 m)   Wt 88 kg (194 lb 0.1 oz)   BMI 27.06 kg/m²    Physical Exam  Vitals and nursing note reviewed.   Constitutional:       Appearance: Normal appearance.   HENT:      Head: Normocephalic.      Mouth/Throat:      Mouth: Mucous membranes are moist.   Eyes:      Conjunctiva/sclera: Conjunctivae normal.   Cardiovascular:      Rate and Rhythm: Normal rate and regular rhythm.      Comments: Biphasic Doppler signal bilateral PT    Palpable right radial, right hand neurovascular status intact  Pulmonary:      Effort: Pulmonary effort is normal.   Abdominal:      Palpations: Abdomen is soft.   Musculoskeletal:      Right lower leg: No edema.      Left lower leg: No edema.   Skin:     General: Skin is warm and dry.      Comments: Bilateral lower extremity well healed incisions from previous vein harvest   Neurological:      Mental Status: He is alert and oriented to person, place, and time.   Psychiatric:         Mood and Affect: Mood normal.           Assessment:       1. PVD (peripheral vascular disease)    2. Bilateral carotid artery stenosis    3. Smoker        Plan:     Educated on PVD claudication MICHAEL results  Due to renal function and no significant claudication symptoms or wounds or tissue loss we will closely monitor patient follow-up in 6 months with carotid duplex educated if leg symptoms progress or wounds or tissue loss develops that does not heal call p.r.n.    we will repeat his arterial duplex with ABIs  Continue aspirin daily smoking cessation      "

## 2025-05-21 ENCOUNTER — OFFICE VISIT (OUTPATIENT)
Dept: FAMILY MEDICINE | Facility: CLINIC | Age: 75
End: 2025-05-21
Payer: MEDICARE

## 2025-05-21 ENCOUNTER — CLINICAL SUPPORT (OUTPATIENT)
Dept: DIABETES | Facility: CLINIC | Age: 75
End: 2025-05-21
Payer: MEDICARE

## 2025-05-21 VITALS
HEART RATE: 65 BPM | WEIGHT: 194 LBS | DIASTOLIC BLOOD PRESSURE: 66 MMHG | SYSTOLIC BLOOD PRESSURE: 148 MMHG | OXYGEN SATURATION: 99 % | BODY MASS INDEX: 27.16 KG/M2 | TEMPERATURE: 98 F | HEIGHT: 71 IN | RESPIRATION RATE: 18 BRPM

## 2025-05-21 DIAGNOSIS — Z13.220 LIPID SCREENING: ICD-10-CM

## 2025-05-21 DIAGNOSIS — Z12.5 SCREENING PSA (PROSTATE SPECIFIC ANTIGEN): Primary | ICD-10-CM

## 2025-05-21 DIAGNOSIS — E55.9 VITAMIN D DEFICIENCY, UNSPECIFIED: ICD-10-CM

## 2025-05-21 DIAGNOSIS — E11.9 TYPE 2 DIABETES MELLITUS WITHOUT COMPLICATION, WITHOUT LONG-TERM CURRENT USE OF INSULIN: Primary | ICD-10-CM

## 2025-05-21 LAB
CHOLEST SERPL-MCNC: 126 MG/DL
CHOLEST/HDLC SERPL: 3.5 {RATIO}
HDLC SERPL-MCNC: 36 MG/DL (ref 35–60)
LDLC SERPL CALC-MCNC: 70 MG/DL
LDLC/HDLC SERPL: 1.9 {RATIO}
NONHDLC SERPL-MCNC: 90 MG/DL
PSA SERPL-MCNC: 3.43 NG/ML
TRIGL SERPL-MCNC: 101 MG/DL (ref 34–140)
VLDLC SERPL-MCNC: 20 MG/DL

## 2025-05-21 PROCEDURE — 99214 OFFICE O/P EST MOD 30 MIN: CPT | Mod: ,,, | Performed by: INTERNAL MEDICINE

## 2025-05-21 PROCEDURE — 99212 OFFICE O/P EST SF 10 MIN: CPT | Mod: PBBFAC

## 2025-05-21 PROCEDURE — 3062F POS MACROALBUMINURIA REV: CPT | Mod: ,,, | Performed by: INTERNAL MEDICINE

## 2025-05-21 PROCEDURE — 1101F PT FALLS ASSESS-DOCD LE1/YR: CPT | Mod: ,,, | Performed by: INTERNAL MEDICINE

## 2025-05-21 PROCEDURE — 3078F DIAST BP <80 MM HG: CPT | Mod: ,,, | Performed by: INTERNAL MEDICINE

## 2025-05-21 PROCEDURE — 3008F BODY MASS INDEX DOCD: CPT | Mod: ,,, | Performed by: INTERNAL MEDICINE

## 2025-05-21 PROCEDURE — G0108 DIAB MANAGE TRN  PER INDIV: HCPCS | Mod: PBBFAC | Performed by: INTERNAL MEDICINE

## 2025-05-21 PROCEDURE — 1126F AMNT PAIN NOTED NONE PRSNT: CPT | Mod: ,,, | Performed by: INTERNAL MEDICINE

## 2025-05-21 PROCEDURE — 3077F SYST BP >= 140 MM HG: CPT | Mod: ,,, | Performed by: INTERNAL MEDICINE

## 2025-05-21 PROCEDURE — 3066F NEPHROPATHY DOC TX: CPT | Mod: ,,, | Performed by: INTERNAL MEDICINE

## 2025-05-21 PROCEDURE — 1159F MED LIST DOCD IN RCRD: CPT | Mod: ,,, | Performed by: INTERNAL MEDICINE

## 2025-05-21 PROCEDURE — 4010F ACE/ARB THERAPY RXD/TAKEN: CPT | Mod: ,,, | Performed by: INTERNAL MEDICINE

## 2025-05-21 PROCEDURE — 3288F FALL RISK ASSESSMENT DOCD: CPT | Mod: ,,, | Performed by: INTERNAL MEDICINE

## 2025-05-21 PROCEDURE — 99999PBSHW PR PBB SHADOW TECHNICAL ONLY FILED TO HB: Mod: PBBFAC,,,

## 2025-05-21 PROCEDURE — 99999 PR PBB SHADOW E&M-EST. PATIENT-LVL II: CPT | Mod: PBBFAC,,,

## 2025-05-21 RX ORDER — ERGOCALCIFEROL 1.25 MG/1
50000 CAPSULE ORAL
Qty: 4 CAPSULE | Refills: 0 | Status: SHIPPED | OUTPATIENT
Start: 2025-05-21

## 2025-05-21 NOTE — PROGRESS NOTES
"Subjective:       Patient ID: Sami Kerns is a 75 y.o. male.    Chief Complaint: Peripheral Vascular Disease (2 month fu ) and Health Maintenance (TETANUS VACCINE Never done/Lipid Panel due on 04/08/2025/LDCT Lung Screen due on 04/26/2025/)    History of Present Illness    CHIEF COMPLAINT:  Patient presents today for follow up    BLOOD PRESSURE:  He reports home blood pressure reading of 132/63.    GASTROINTESTINAL:  Recent colonoscopy revealed a benign polyp which was removed during the procedure.    SOCIAL HISTORY:  He reports consuming beer last night after a one week period of alcohol abstinence.    MEDICATIONS:  He reports not taking prescribed vitamin D despite having low vitamin D levels.         Current Medications:  Current Medications[1]           ROS  Twelve point system reviewed, unremarkable except for stated above in HPI.        Objective:         Vitals:    05/21/25 0944 05/21/25 1000   BP: (!) 148/62 (!) 148/66   BP Location: Right arm    Patient Position: Sitting    Pulse: 65    Resp: 18    Temp: 97.6 °F (36.4 °C)    TempSrc: Temporal    SpO2: 99%    Weight: 88 kg (194 lb)    Height: 5' 11" (1.803 m)         Physical Exam     Patient is awake alert oriented person place and  Lungs are clear to auscultation bilaterally no crackles or wheezes   Cardiovascular S1-S2 regular rate and rhythm no murmurs rubs or gallops   Abdomen is soft positive bowel sounds nontender, extremities no clubbing cyanosis edema  Neuro no focal neurological deficits  Skin warm and dry.     Last Labs:     No visits with results within 1 Month(s) from this visit.   Latest known visit with results is:   Hospital Outpatient Visit on 04/14/2025   Component Date Value    POC Glucose 04/14/2025 85     Case Report 04/14/2025                      Value:Surgical Pathology                                Case: [id number]                                   Authorizing Provider:  Johnathon Zambrano MD     Collected:           04/14/2025 " 02:42 PM          Ordering Location:     [facility] -         Received:            04/14/2025 03:33 PM                                 Endoscopy                                                                    Pathologist:           James Keith MD                                                      Specimen:    Rectum, a. rectum polyp x2                                                                 Final Diagnosis 04/14/2025                      Value:A. Rectum polyps, biopsy:  - Hyperplastic polyps      Gross Description 04/14/2025                      Value:A. Rectum: a. rectum polyp x2  The specimen is received in formalin designated a. rectum polyp x2 and consists of 3 pink-tan tissue fragments that measure from 0.2-0.5 cm in greatest dimension and is entirely submitted in cassette A1.    Grossing was completed by [facility].      Microscopic Description 04/14/2025                      Value:A microscopic examination was performed and the diagnosis reflects the findings.          Laboratory Notes 04/14/2025                      Value:If this report includes immunohistochemical (IHC) test results, please note the following: IHC studies were interpreted in conjunction with appropriate positive and negative controls which demonstrate the expected positive and negative reactivity. This laboratory is regulated under CLIA as qualified to perform high-complexity testing. IHC tests are used for clinical purposes. They should not be regarded as investigational or research.           Last Imaging:  US Lower Extrem Arteries Bilat with MICHAEL (xpd)  Narrative: EXAMINATION:  US ARTERIAL LOWER EXTREMITY BILAT WITH MICHAEL (XPD)    CLINICAL HISTORY:  Peripheral vascular disease, unspecified    TECHNIQUE:  Continuous wave Doppler common pneumatic cuffs utilized    COMPARISON:  None    FINDINGS:  Right common femoral artery 125 centimeters/second monophasic waveforms, profundus femora 71 centimeters/second  monophasic waveforms, SFA proximal 22 centimeters/second biphasic waveforms, SFA mid 141 centimeters/second monophasic waveforms, SFA distal 17 centimeters/second monophasic, popliteal 24 centimeters/second monophasic, posterior tibial 48 centimeters/seconds monophasic common dorsalis pedis occluded    Left common femoral artery 113 centimeters/second monophasic, profundus femorals 103 centimeters/second monophasic, SFA proximal 77 centimeters/second monophasic mid 149 centimeters/second monophasic, distal 97 centimeters/second monophasic, popliteal 59 centimeters/second monophasic, posterior tibial 77 centimeters/second monophasic common dorsalis pedis 14 centimeters/second monophasic    Right brachial pressure 124 mm Hg, MICHAEL 0.47    Left brachial pressure 124 mm Hg, MICHAEL 0.65  Impression: Right lower extremity spectral broadening abnormal waveforms focal area of more than doubling of velocities and occluded dorsalis pedis suggesting severe disease with an MICHAEL 0.47.  Left lower extremity spectral broadening abnormal waveforms moderate peripheral vascular disease with an MICHAEL 0.65    TECHNOLOGIST: Malgorzata Mendez (RT) (VS) RVT    Electronically signed by: Pratima Thomas  Date:    05/04/2025  Time:    15:18         **Labs and x-rays personally reviewed by me    ** reviewed           Assessment & Plan:   Assessment & Plan    IMPRESSION:  - Reviewed recent BP reading of 132/63, within acceptable range.  - Noted recent colonoscopy results showing removal of non-cancerous polyp.  - Identified low vitamin D levels from previous lab work and started vitamin D supplement (prescription to be provided).  - Assessed alcohol consumption and its potential impact on health.    ALCOHOL USE:  - Patient to cut back on beer intake.    VITAMIN D DEFICIENCY:  - Started vitamin D supplement (prescription to be provided).    PROSTATE CANCER SCREENING:  - Ordered labs, including PSA due to time elapsed since last test.    FOLLOW-UP:  - Follow  up in October.           1. Screening PSA (prostate specific antigen)  -     PSA, Screening; Future; Expected date: 05/21/2025    2. Lipid screening  -     Lipid Panel; Future; Expected date: 05/21/2025    3. Vitamin D deficiency, unspecified  -     ergocalciferol (ERGOCALCIFEROL) 50,000 unit Cap; Take 1 capsule (50,000 Units total) by mouth every 7 days.  Dispense: 4 capsule; Refill: 0            Michael Weems MD  This note was generated with the assistance of ambient listening technology. Verbal consent was obtained by the patient and accompanying visitor(s) for the recording of patient appointment to facilitate this note. I attest to having reviewed and edited the generated note for accuracy, though some syntax or spelling errors may persist. Please contact the author of this note for any clarification.            [1]   Current Outpatient Medications:     albuterol (PROVENTIL/VENTOLIN HFA) 90 mcg/actuation inhaler, INHALE 2 PUFFS BY MOUTH EVERY 6 HOURS AS NEEDED FOR WHEEZING, Disp: 18 g, Rfl: 5    allopurinoL (ZYLOPRIM) 100 MG tablet, Take 2 tablets (200 mg total) by mouth once daily., Disp: 90 tablet, Rfl: 1    amiodarone (PACERONE) 200 MG Tab, Take by mouth once daily., Disp: , Rfl:     ascorbic acid, vitamin C, (VITAMIN C) 1000 MG tablet, Take 1,000 mg by mouth once daily., Disp: , Rfl:     aspirin (ECOTRIN) 81 MG EC tablet, Take 81 mg by mouth once daily., Disp: , Rfl:     atorvastatin (LIPITOR) 80 MG tablet, Take 1 tablet by mouth once daily., Disp: , Rfl:     blood sugar diagnostic Strp, 1 each by Misc.(Non-Drug; Combo Route) route 2 (two) times a day., Disp: 200 each, Rfl: 1    blood-glucose meter kit, Use as instructed, Disp: 1 each, Rfl: 0    cilostazoL (PLETAL) 100 MG Tab, Take 100 mg by mouth 2 (two) times daily., Disp: , Rfl:     clopidogreL (PLAVIX) 75 mg tablet, Take 1 tablet (75 mg total) by mouth once daily., Disp: 90 tablet, Rfl: 1    ezetimibe (ZETIA) 10 mg tablet, Take 10 mg by mouth once  daily., Disp: , Rfl:     furosemide (LASIX) 40 MG tablet, Take 40 mg by mouth once daily., Disp: , Rfl:     hydrALAZINE (APRESOLINE) 50 MG tablet, Take 1 tablet (50 mg total) by mouth 3 (three) times daily., Disp: 90 tablet, Rfl: 1    indomethacin (INDOCIN) 25 MG capsule, Take 1 capsule (25 mg total) by mouth 2 (two) times daily as needed (for pain)., Disp: 20 capsule, Rfl: 0    isosorbide mononitrate (IMDUR) 30 MG 24 hr tablet, Take 15 mg by mouth., Disp: , Rfl:     lancets (LANCETS,THIN) Misc, 1 each by Misc.(Non-Drug; Combo Route) route 2 (two) times daily., Disp: 200 each, Rfl: 1    latanoprost 0.005 % ophthalmic solution, Place 1 drop into both eyes every evening., Disp: , Rfl:     losartan (COZAAR) 50 MG tablet, Take 2 tablets (100 mg total) by mouth once daily., Disp: 90 tablet, Rfl: 1    metFORMIN (GLUCOPHAGE) 500 MG tablet, Take 1 tablet (500 mg total) by mouth 2 (two) times daily with meals., Disp: 180 tablet, Rfl: 1    metoprolol succinate (TOPROL-XL) 50 MG 24 hr tablet, Take 1 tablet by mouth once daily., Disp: , Rfl:     nitroGLYCERIN (NITROSTAT) 0.4 MG SL tablet, Place under the tongue., Disp: , Rfl:     tamsulosin (FLOMAX) 0.4 mg Cap, TAKE 1 CAPSULE BY MOUTH EVERY DAY, Disp: 90 capsule, Rfl: 3    timolol maleate 0.5% (TIMOPTIC) 0.5 % Drop, Place into both eyes., Disp: , Rfl:     traMADoL (ULTRAM) 50 mg tablet, Take 1 tablet (50 mg total) by mouth every 6 (six) hours as needed for Pain., Disp: 12 tablet, Rfl: 0    valsartan (DIOVAN) 320 MG tablet, Take 320 mg by mouth., Disp: , Rfl:     ergocalciferol (ERGOCALCIFEROL) 50,000 unit Cap, Take 1 capsule (50,000 Units total) by mouth every 7 days., Disp: 4 capsule, Rfl: 0  No current facility-administered medications for this visit.    Facility-Administered Medications Ordered in Other Visits:     0.9%  NaCl infusion, , Intravenous, Continuous, Jesse Smalls MD, Last Rate: 50 mL/hr at 08/15/23 0714, New Bag at 08/15/23 0714

## 2025-05-21 NOTE — PROGRESS NOTES
"Diabetes Care Specialist Progress Note  Author: FLORES CHURCH RN  Date: 5/21/2025    Intake    Program Intake  Reason for Diabetes Program Visit:: Intervention  Type of Intervention:: Individual  Individual: Education  Education: Self-Management Skill Review    Current Diabetes Treatment: Oral Medications  Oral Medication Type/Dose: Take 1 tablet (500 mg total) by mouth 2 (two) times daily with meals. - Oral  (PATIENT ONLY TAKING 1 X DAY IN THE MORNING)  He  thinks 2 is too much.     No results found for: "LABA1C", "HGBA1C"    Referred to Diabetes Care Specialist by Dr. Michael Weems for a new diagnosis of diabetes.   This is patients 2nd visit.       Weight: 88 kg (194 lb)   Height: 5' 11" (180.3 cm)   Body mass index is 27.06 kg/m².      Lifestyle Coping Support & Clinical    Lifestyle/Coping/Support  Compared to other people your age, how would you rate your health?: Fair  Does anyone in your family have diabetes or does anyone in your family support you in your diabetes care?: Daughter is supportive  List anything about Diabetes that causes you stress?: No doesn't worry about it  How do you deal with stress/distress?: walks away  Culture or Sabianist beliefs that may impact ability to access healthcare: No  Psychosocial/Coping Skills Assessment Completed: : Yes  Assessment indicates:: Adequate understanding  Area of need?: No      Diabetes Self-Management Skills Assessment     Acute Complications  Have you ever had hypoglycemia (low BG 70 or less)?: no  Have you ever had hyperglycemia (high  or more)?: no  Have you ever had DKA?: no  Do you ever test for ketones?: no  Do you have a sick day plan?: no  Acute Complications Skills Assessment Completed: : Yes  Assessment indicates:: Instruction Needed  Area of need?: Yes    Chronic Complications  Reviewed health maintenance: yes  Do you examine your feet daily?: no  Has your doctor examined your feet?: no  Do you see a Dentist?: no  Do you see an eye doctor?: " "yes  Chronic Complications Skills Assessment Completed: : Yes  Assessment indicates:: Instruction Needed  Area of need?: Yes    Based on today's diabetes care assessment, the following areas of need were identified:      Identified Areas of Need      Medication/Current Diabetes Treatment:  See Care Plan     Lifestyle Coping/Support:  No - Discussed role of stress on diabetes management. A handout provided on "Diabetes Distress" and "Diabetes and Mental Health" from the ADA.  Patient states he has good support.     Diabetes Disease Process/Treatment Options:  Patient verbalizes understanding of diabetes and has a general idea of what happens when someone has diabetes, risk factors, and treatment options.      Nutrition/Healthy Eating:  See Care Plan     Physical Activity/Exercise:  See Care Plan     Home Blood Glucose Monitoring:  See Care Plan     Acute Complications: Yes - Discussed prevention, identification signs/symptoms and treatment of hyperglycemia and hypoglycemia and when to contact clinic. Reviewed having a sick day Plan. Handout provided on Hypoglycemia, Hyperglycemia, Traveling with Diabetes, and Sick Day Plan     Chronic Complications: Yes - Discussed importance of A1c less than 7 to reduce risk of micro and macro complications, including nephropathy, neuropathy, retinopathy, heart attack and stroke. Reviewed the importance of controlled Blood Pressure and Cholesterol Lab Values in preventing disease. Handouts provided on, Knowing Your Numbers and the A, B, Cs of Diabetes.        Today's interventions were provided through individual discussion, instruction, and written materials were provided.      Patient verbalized understanding of instruction and written materials.  Pt was able to return back demonstration of instructions today. Patient understood key points, needs reinforcement and further instruction.     Diabetes Self-Management Care Plan:    Today's Diabetes Self-Management Care Plan was " developed with Sami's input. Sami has agreed to work toward the following goal(s) to improve his/her overall diabetes control.      Care Plan: Diabetes Management        Problem: Healthy Eating         Goal: Eat 3 meals daily with 45-60g (3-4) servings of Carbohydrate per meal.    Start Date: 4/28/2025   Expected End Date: 8/21/2025   This Visit's Progress: No change   Barriers: No Barriers Identified   Note:    Eating out every meal, doesn't cook. Reinforced the A, B, C's of managing diabetes and emphasized eating out less and going by the plate method. He is able to state where they should go on a 9 inch plate. Reinforced how to read a nutritional facts label and using the plate method every time he eats.  Reinforced eating 5% or <  saturated fats and 5% or < sodium and  Carbohydrate counting. (15 grams makes 1 carbohydrate serving).  Looked up [place] on the [employer] karen. He's drinking 2 Sapello lite's 3 x week. Discussed 1 serving  of alcohol is equivalent to 12 ounces of beer.  For a man he can have 2 servings.        Problem: Physical Activity and Exercise         Goal: Patient agrees to increase physical activity to a goal of 5 times per week for 30 minutes.    Start Date: 4/28/2025   Expected End Date: 5/21/2025   This Visit's Progress: On track   Barriers: No Barriers Identified   Note:    Patient admits he can increase his  activity.  He's lifting  weights a few days a week and drives himself  daily to places in his car.   Discussed ways to increase activity. The recommended ADA guidelines reinforced: 30 minutes a  day 5 days a week and not missing > 2 days without activity and the role of physical activity Patient verbalizes understanding.      Task: Discussed role of physical activity on reducing insulin resistance and improvement in overall glycemic control. Completed 5/21/2025     Task: Offered suggestions on how patient could increase their regular physical activity Completed 5/21/2025           Problem: Blood Glucose Self-Monitoring         Goal: Goal: Patient agrees to check and record blood sugars randomly AC and 2 hours PP (1-2 times ) per day.    Start Date: 4/28/2025   Expected End Date: 5/21/2025   This Visit's Progress: No change   Barriers: No Barriers Identified   Note:    Has checked his blood sugar 1 x in the last couple of weeks. He had a lot of questions about what are the recommended ranges. Discussed the benefits of monitoring your blood sugar.  It helps you track things that cause your blood sugar to go up/down such as: what you eat, physical activity, stressful situations and when you are sick and if you are meeting your blood glucose goals.  He wants to continue with this goal and plans to get a glucose monitor and start checking her blood sugar. Reports he's going to check his blood sugar randomly The guidelines was discussed and handouts provided.      Task: Discussed ways to minimize pain when monitoring blood glucose. Completed 5/21/2025          Problem: Medications         Goal: Patient Agrees to take Diabetes Medication(s) Metformin as prescribed.    Start Date: 5/21/2025   Expected End Date: 8/21/2025   Barriers: No Barriers Identified   Note:    Discussed that diabetes medication helps lower your blood sugar levels. Metformin reduces the amount of sugar that your liver releases and helps your body respond to insulin its producing. Side effects reviewed and patient isn't having any GI side effects. Emphasized skipping medications doesn't help  keep your blood glucose levels at goal and can lead to complications of diabetes. He's taking his morning dose  Metformin with food. Discussed calling HCP and discuss concerns before stopping or not tolerating medication. That he needs to take 2nd dose in the evening with meals. Monitoring his blood sugar before and after a meal will tell him how his medication is working. Patient verbalizes understanding and agrees to take Metformin  twice a day with a meal as prescribed.        Follow Up Plan     Follow up in about 3 months (around 8/21/2025).    Today's care plan and follow up schedule was discussed with patient.  Sami verbalized understanding of the care plan, goals, and agrees to follow up plan.        The patient was encouraged to communicate with his/her health care provider/physician and care team regarding his/her condition(s) and treatment.  I provided the patient with my contact information today and encouraged to contact me via phone or [facility] as needed.     Length of Visit   Total Time: 60 Minutes

## 2025-05-27 RX ORDER — ALBUTEROL SULFATE 90 UG/1
2 INHALANT RESPIRATORY (INHALATION) EVERY 6 HOURS PRN
Qty: 18 G | Refills: 5 | Status: SHIPPED | OUTPATIENT
Start: 2025-05-27

## 2025-05-27 RX ORDER — NITROGLYCERIN 0.4 MG/1
0.4 TABLET SUBLINGUAL EVERY 5 MIN PRN
Qty: 30 TABLET | Refills: 0 | Status: SHIPPED | OUTPATIENT
Start: 2025-05-27

## 2025-05-31 ENCOUNTER — EXTERNAL CHRONIC CARE MANAGEMENT (OUTPATIENT)
Dept: INTERNAL MEDICINE | Facility: CLINIC | Age: 75
End: 2025-05-31
Payer: MEDICARE

## 2025-05-31 PROCEDURE — 99490 CHRNC CARE MGMT STAFF 1ST 20: CPT | Mod: PBBFAC | Performed by: INTERNAL MEDICINE

## 2025-05-31 PROCEDURE — 99490 CHRNC CARE MGMT STAFF 1ST 20: CPT | Mod: S$PBB,,, | Performed by: INTERNAL MEDICINE

## 2025-06-30 ENCOUNTER — EXTERNAL CHRONIC CARE MANAGEMENT (OUTPATIENT)
Dept: INTERNAL MEDICINE | Facility: CLINIC | Age: 75
End: 2025-06-30
Payer: MEDICARE

## 2025-06-30 PROCEDURE — 99490 CHRNC CARE MGMT STAFF 1ST 20: CPT | Mod: ,,, | Performed by: INTERNAL MEDICINE

## 2025-07-15 ENCOUNTER — HOSPITAL ENCOUNTER (EMERGENCY)
Facility: HOSPITAL | Age: 75
Discharge: HOME | End: 2025-07-15
Attending: EMERGENCY MEDICINE
Payer: MEDICARE

## 2025-07-15 VITALS
WEIGHT: 198 LBS | TEMPERATURE: 98 F | RESPIRATION RATE: 18 BRPM | DIASTOLIC BLOOD PRESSURE: 75 MMHG | BODY MASS INDEX: 27.72 KG/M2 | SYSTOLIC BLOOD PRESSURE: 184 MMHG | HEIGHT: 71 IN | OXYGEN SATURATION: 98 % | HEART RATE: 66 BPM

## 2025-07-15 DIAGNOSIS — M10.9 GOUT, UNSPECIFIED CAUSE, UNSPECIFIED CHRONICITY, UNSPECIFIED SITE: Primary | ICD-10-CM

## 2025-07-15 DIAGNOSIS — R52 PAIN: ICD-10-CM

## 2025-07-15 LAB
ALBUMIN SERPL BCP-MCNC: 3.5 G/DL (ref 3.4–4.8)
ALBUMIN/GLOB SERPL: 0.8 {RATIO}
ALP SERPL-CCNC: 96 U/L (ref 40–150)
ALT SERPL W P-5'-P-CCNC: 8 U/L
ANION GAP SERPL CALCULATED.3IONS-SCNC: 13 MMOL/L (ref 7–16)
AST SERPL W P-5'-P-CCNC: 22 U/L (ref 11–45)
BASOPHILS # BLD AUTO: 0.02 K/UL (ref 0–0.2)
BASOPHILS NFR BLD AUTO: 0.3 % (ref 0–1)
BILIRUB SERPL-MCNC: 0.4 MG/DL
BUN SERPL-MCNC: 23 MG/DL (ref 8–26)
BUN/CREAT SERPL: 11 (ref 6–20)
CALCIUM SERPL-MCNC: 9.4 MG/DL (ref 8.8–10)
CHLORIDE SERPL-SCNC: 105 MMOL/L (ref 98–107)
CO2 SERPL-SCNC: 25 MMOL/L (ref 23–31)
CREAT SERPL-MCNC: 2.01 MG/DL (ref 0.72–1.25)
DIFFERENTIAL METHOD BLD: ABNORMAL
EGFR (NO RACE VARIABLE) (RUSH/TITUS): 34 ML/MIN/1.73M2
EOSINOPHIL # BLD AUTO: 0.24 K/UL (ref 0–0.5)
EOSINOPHIL NFR BLD AUTO: 3.6 % (ref 1–4)
ERYTHROCYTE [DISTWIDTH] IN BLOOD BY AUTOMATED COUNT: 14.6 % (ref 11.5–14.5)
GLOBULIN SER-MCNC: 4.3 G/DL (ref 2–4)
GLUCOSE SERPL-MCNC: 107 MG/DL (ref 82–115)
HCT VFR BLD AUTO: 34.4 % (ref 40–54)
HGB BLD-MCNC: 11.8 G/DL (ref 13.5–18)
IMM GRANULOCYTES # BLD AUTO: 0.02 K/UL (ref 0–0.04)
IMM GRANULOCYTES NFR BLD: 0.3 % (ref 0–0.4)
LYMPHOCYTES # BLD AUTO: 1.32 K/UL (ref 1–4.8)
LYMPHOCYTES NFR BLD AUTO: 19.6 % (ref 27–41)
MCH RBC QN AUTO: 28.2 PG (ref 27–31)
MCHC RBC AUTO-ENTMCNC: 34.3 G/DL (ref 32–36)
MCV RBC AUTO: 82.3 FL (ref 80–96)
MONOCYTES # BLD AUTO: 0.52 K/UL (ref 0–0.8)
MONOCYTES NFR BLD AUTO: 7.7 % (ref 2–6)
MPC BLD CALC-MCNC: 10.7 FL (ref 9.4–12.4)
NEUTROPHILS # BLD AUTO: 4.6 K/UL (ref 1.8–7.7)
NEUTROPHILS NFR BLD AUTO: 68.5 % (ref 53–65)
NRBC # BLD AUTO: 0 X10E3/UL
NRBC, AUTO (.00): 0 %
PLATELET # BLD AUTO: 238 K/UL (ref 150–400)
POTASSIUM SERPL-SCNC: 4.5 MMOL/L (ref 3.5–5.1)
PROT SERPL-MCNC: 7.8 G/DL (ref 5.8–7.6)
RBC # BLD AUTO: 4.18 M/UL (ref 4.6–6.2)
SODIUM SERPL-SCNC: 138 MMOL/L (ref 136–145)
URATE SERPL-MCNC: 9.2 MG/DL (ref 3.5–7.2)
WBC # BLD AUTO: 6.72 K/UL (ref 4.5–11)

## 2025-07-15 PROCEDURE — 63600175 PHARM REV CODE 636 W HCPCS: Performed by: EMERGENCY MEDICINE

## 2025-07-15 PROCEDURE — 96372 THER/PROPH/DIAG INJ SC/IM: CPT | Performed by: EMERGENCY MEDICINE

## 2025-07-15 PROCEDURE — 85025 COMPLETE CBC W/AUTO DIFF WBC: CPT | Performed by: EMERGENCY MEDICINE

## 2025-07-15 PROCEDURE — 99284 EMERGENCY DEPT VISIT MOD MDM: CPT | Mod: 25

## 2025-07-15 PROCEDURE — 84550 ASSAY OF BLOOD/URIC ACID: CPT | Performed by: EMERGENCY MEDICINE

## 2025-07-15 PROCEDURE — 80053 COMPREHEN METABOLIC PANEL: CPT | Performed by: EMERGENCY MEDICINE

## 2025-07-15 PROCEDURE — 36415 COLL VENOUS BLD VENIPUNCTURE: CPT | Performed by: EMERGENCY MEDICINE

## 2025-07-15 RX ORDER — DEXAMETHASONE SODIUM PHOSPHATE 4 MG/ML
8 INJECTION, SOLUTION INTRA-ARTICULAR; INTRALESIONAL; INTRAMUSCULAR; INTRAVENOUS; SOFT TISSUE
Status: COMPLETED | OUTPATIENT
Start: 2025-07-15 | End: 2025-07-15

## 2025-07-15 RX ORDER — KETOROLAC TROMETHAMINE 15 MG/ML
15 INJECTION, SOLUTION INTRAMUSCULAR; INTRAVENOUS
Status: COMPLETED | OUTPATIENT
Start: 2025-07-15 | End: 2025-07-15

## 2025-07-15 RX ORDER — COLCHICINE 0.6 MG/1
TABLET ORAL
Qty: 30 TABLET | Refills: 11 | Status: SHIPPED | OUTPATIENT
Start: 2025-07-15

## 2025-07-15 RX ADMIN — KETOROLAC TROMETHAMINE 15 MG: 15 INJECTION, SOLUTION INTRAMUSCULAR; INTRAVENOUS at 01:07

## 2025-07-15 RX ADMIN — DEXAMETHASONE SODIUM PHOSPHATE 8 MG: 4 INJECTION, SOLUTION INTRA-ARTICULAR; INTRALESIONAL; INTRAMUSCULAR; INTRAVENOUS; SOFT TISSUE at 01:07

## 2025-07-15 NOTE — ED PROVIDER NOTES
Encounter Date: 7/15/2025    SCRIBE #1 NOTE: I, Payal Loyolaovan, am scribing for, and in the presence of,  Bryant Yuen MD.       History     Chief Complaint   Patient presents with    Wrist Pain     C/O LEFT WRIST PAIN, +SWELLING; HX OF GOUT; DENIES INJURY     Patient is a 75 year old male with a history of HTN, HLD, CAD, CHF, COPD, arthritis and Gout presenting to the ED with complaints of left wrist pain for 2 days. He denies known injury or trauma to the area and noted increased swelling earlier tonight. He denies fever, chills or any other symptoms at this time.     The history is provided by the patient.     Review of patient's allergies indicates:  No Known Allergies  Past Medical History:   Diagnosis Date    Adenomatous polyp of ascending colon 11/29/2021    Adenomatous polyp of descending colon 11/29/2021    Adenomatous polyp of transverse colon 11/29/2021    Anticoagulant long-term use     Blood in stool 11/29/2021    CAD (coronary artery disease)     CHF (congestive heart failure)     COPD (chronic obstructive pulmonary disease)     SOB with walking distances 10/2022    GERD (gastroesophageal reflux disease)     Gout     HH (hiatus hernia) 05/16/2022    HTN (hypertension)     Hyperlipidemia     Iron deficiency anemia due to chronic blood loss 11/29/2021    follows with : Anemia, Monoclonal Gammopathy    Orchitis 01/20/2022    Osteoarthritis     Polyp of splenic flexure of colon 11/29/2021    Screening for colon cancer 11/29/2021    Urinary incontinence 01/20/2022    Vocal cord polyps     followed by  2022     Past Surgical History:   Procedure Laterality Date    CORONARY ARTERY BYPASS GRAFT (CABG)      DIRECT LARYNGOSCOPY Bilateral 05/10/2022    Procedure: LARYNGOSCOPY, DIRECT;  Surgeon: Jesse Smalls MD;  Location: [facility] OR;  Service: ENT;  Laterality: Bilateral;    DIRECT LARYNGOSCOPY N/A 11/11/2022    Procedure: LARYNGOSCOPY, DIRECT;  Surgeon: Jesse Smalls MD;  Location:  [facility] OR;  Service: ENT;  Laterality: N/A;    DIRECT LARYNGOSCOPY N/A 8/15/2023    Procedure: LARYNGOSCOPY, DIRECT;  Surgeon: Jesse Smalls MD;  Location: [facility] OR;  Service: ENT;  Laterality: N/A;    TONSILLECTOMY N/A     at age 18    VOCAL FOLD LESION EXCISION Bilateral 05/10/2022    Procedure: EXCISION, LESION, VOCAL CORD;  Surgeon: Jesse Smalls MD;  Location: [facility] OR;  Service: ENT;  Laterality: Bilateral;    VOCAL FOLD LESION EXCISION N/A 11/11/2022    Procedure: EXCISION, LESION, VOCAL CORD POLYP;  Surgeon: Jesse Smalls MD;  Location: [facility] OR;  Service: ENT;  Laterality: N/A;    VOCAL FOLD LESION EXCISION Right 8/15/2023    Procedure: EXCISION, LESION, VOCAL CORD;  Surgeon: Jesse Smalls MD;  Location: [facility] OR;  Service: ENT;  Laterality: Right;     Family History   Problem Relation Name Age of Onset    Hypertension Mother      Diabetes Sister      Diabetes Sister       Social History[1]  Review of Systems   Musculoskeletal:  Positive for arthralgias and joint swelling.   Skin:  Negative for wound.   All other systems reviewed and are negative.      Physical Exam     Initial Vitals   BP Pulse Resp Temp SpO2   07/15/25 0018 07/15/25 0018 07/15/25 0017 07/15/25 0017 07/15/25 0018   (!) 184/75 66 18 98.1 °F (36.7 °C) 98 %      MAP       --                Physical Exam    Nursing note and vitals reviewed.  Constitutional: Vital signs are normal. He appears well-developed and well-nourished.   HENT:   Head: Normocephalic and atraumatic.   Eyes: Conjunctivae and EOM are normal. Pupils are equal, round, and reactive to light.   Neck: Neck supple.   Normal range of motion.  Cardiovascular:  Normal rate, regular rhythm, normal heart sounds and normal pulses.           Pulmonary/Chest: Breath sounds normal.   Abdominal: Abdomen is soft. Bowel sounds are normal.   Musculoskeletal:         General: Normal range of motion.      Cervical back: Normal range of motion and  neck supple.      Comments: Full ROM of left wrist with mild swelling     Neurological: He is alert and oriented to person, place, and time. He has normal strength.   Skin: Skin is warm, dry and intact.   Psychiatric: He has a normal mood and affect. His speech is normal and behavior is normal. Judgment and thought content normal. Cognition and memory are normal.         ED Course   Procedures  Labs Reviewed   COMPREHENSIVE METABOLIC PANEL - Abnormal       Result Value    Sodium 138      Potassium 4.5      Chloride 105      CO2 25      Anion Gap 13      Glucose 107      BUN 23      Creatinine 2.01 (*)     BUN/Creatinine Ratio 11      Calcium 9.4      Total Protein 7.8 (*)     Albumin 3.5      Globulin 4.3 (*)     A/G Ratio 0.8      Bilirubin, Total 0.4      Alk Phos 96      ALT 8      AST 22      eGFR 34 (*)    URIC ACID - Abnormal    Uric Acid 9.2 (*)    CBC WITH DIFFERENTIAL - Abnormal    WBC 6.72      RBC 4.18 (*)     Hemoglobin 11.8 (*)     Hematocrit 34.4 (*)     MCV 82.3      MCH 28.2      MCHC 34.3      RDW 14.6 (*)     Platelet Count 238      MPV 10.7      Neutrophils % 68.5 (*)     Lymphocytes % 19.6 (*)     Monocytes % 7.7 (*)     Eosinophils % 3.6      Basophils % 0.3      Immature Granulocytes % 0.3      nRBC, Auto 0.0      Neutrophils, Abs 4.60      Lymphocytes, Absolute 1.32      Monocytes, Absolute 0.52      Eosinophils, Absolute 0.24      Basophils, Absolute 0.02      Immature Granulocytes, Absolute 0.02      nRBC, Absolute 0.00      Diff Type Auto     CBC W/ AUTO DIFFERENTIAL    Narrative:     The following orders were created for panel order CBC auto differential.  Procedure                               Abnormality         Status                     ---------                               -----------         ------                     CBC with Differential[[id number]]       Abnormal            Final result                 Please view results for these tests on the individual orders.           Imaging Results              X-Ray Wrist Complete Left (In process)                      Medications   dexAMETHasone injection 8 mg (8 mg Intramuscular Given 7/15/25 0100)   ketorolac injection 15 mg (15 mg Intramuscular Given 7/15/25 0100)     Medical Decision Making  Patient is a 75 year old male with a history of HTN, HLD, CAD, CHF, COPD, arthritis and Gout presenting to the ED with complaints of left wrist pain for 2 days. He denies known injury or trauma to the area and noted increased swelling earlier tonight. He denies fever, chills or any other symptoms at this time.       Amount and/or Complexity of Data Reviewed  Labs: ordered.  Radiology: ordered.    Risk  Prescription drug management.              Attending Attestation:           Physician Attestation for Scribe:  Physician Attestation Statement for Scribe #1: I, Bryant Yuen MD, reviewed documentation, as scribed by Payal Lomeli in my presence, and it is both accurate and complete.                                        Clinical Impression:  Final diagnoses:  [R52] Pain  [M10.9] Gout, unspecified cause, unspecified chronicity, unspecified site (Primary)          ED Disposition Condition    Discharge Stable          ED Prescriptions       Medication Sig Dispense Start Date End Date Auth. Provider    colchicine (COLCRYS) 0.6 mg tablet Take 2 po 1st day, and then take 1 po daily until attack resolving. 30 tablet 7/15/2025 -- Bryant Yuen MD          Follow-up Information       Follow up With Specialties Details Why Contact Info    Michael Weems MD Internal Medicine, Family Medicine, Hospitalist  As needed [address] [facility] [address]  [phone]                     [1]   Social History  Tobacco Use    Smoking status: Some Days     Current packs/day: 0.50     Average packs/day: 0.5 packs/day for 56.0 years (28.0 ttl pk-yrs)     Types: Cigarettes     Passive exposure: Current    Smokeless tobacco: Current    Tobacco comments:      Smokes 3 cigarettes daily   Substance Use Topics    Alcohol use: Yes     Alcohol/week: 2.0 standard drinks of alcohol     Types: 2 Cans of beer per week     Comment: occasionally    Drug use: Never        Bryant Yuen MD  07/15/25 0226

## 2025-07-28 ENCOUNTER — OFFICE VISIT (OUTPATIENT)
Dept: FAMILY MEDICINE | Facility: CLINIC | Age: 75
End: 2025-07-28
Payer: MEDICARE

## 2025-07-28 VITALS
WEIGHT: 190 LBS | TEMPERATURE: 97 F | DIASTOLIC BLOOD PRESSURE: 63 MMHG | RESPIRATION RATE: 17 BRPM | HEART RATE: 67 BPM | HEIGHT: 71 IN | BODY MASS INDEX: 26.6 KG/M2 | OXYGEN SATURATION: 96 % | SYSTOLIC BLOOD PRESSURE: 126 MMHG

## 2025-07-28 DIAGNOSIS — F17.200 TOBACCO DEPENDENCY: Primary | ICD-10-CM

## 2025-07-28 PROCEDURE — 1126F AMNT PAIN NOTED NONE PRSNT: CPT | Mod: ,,, | Performed by: INTERNAL MEDICINE

## 2025-07-28 PROCEDURE — 3288F FALL RISK ASSESSMENT DOCD: CPT | Mod: ,,, | Performed by: INTERNAL MEDICINE

## 2025-07-28 PROCEDURE — 3066F NEPHROPATHY DOC TX: CPT | Mod: ,,, | Performed by: INTERNAL MEDICINE

## 2025-07-28 PROCEDURE — 1159F MED LIST DOCD IN RCRD: CPT | Mod: ,,, | Performed by: INTERNAL MEDICINE

## 2025-07-28 PROCEDURE — 3078F DIAST BP <80 MM HG: CPT | Mod: ,,, | Performed by: INTERNAL MEDICINE

## 2025-07-28 PROCEDURE — 3062F POS MACROALBUMINURIA REV: CPT | Mod: ,,, | Performed by: INTERNAL MEDICINE

## 2025-07-28 PROCEDURE — 1101F PT FALLS ASSESS-DOCD LE1/YR: CPT | Mod: ,,, | Performed by: INTERNAL MEDICINE

## 2025-07-28 PROCEDURE — 3074F SYST BP LT 130 MM HG: CPT | Mod: ,,, | Performed by: INTERNAL MEDICINE

## 2025-07-28 PROCEDURE — 4010F ACE/ARB THERAPY RXD/TAKEN: CPT | Mod: ,,, | Performed by: INTERNAL MEDICINE

## 2025-07-28 PROCEDURE — 3008F BODY MASS INDEX DOCD: CPT | Mod: ,,, | Performed by: INTERNAL MEDICINE

## 2025-07-28 PROCEDURE — 99213 OFFICE O/P EST LOW 20 MIN: CPT | Mod: ,,, | Performed by: INTERNAL MEDICINE

## 2025-07-28 RX ORDER — IBUPROFEN 200 MG
1 TABLET ORAL DAILY
COMMUNITY
End: 2025-07-28 | Stop reason: SDUPTHER

## 2025-07-28 RX ORDER — IBUPROFEN 200 MG
1 TABLET ORAL DAILY
Qty: 90 PATCH | Refills: 1 | Status: SHIPPED | OUTPATIENT
Start: 2025-07-28

## 2025-07-28 NOTE — PROGRESS NOTES
"Subjective:       Patient ID: Sami Kerns is a 75 y.o. male.    Chief Complaint: Elevated PSA (Follow up )    History of Present Illness    CHIEF COMPLAINT:  Patient presents today for follow up.    RESPIRATORY:  He is a current smoker experiencing cough without colored sputum production. He expresses desire to quit smoking.    GOUT:  He denies current gout symptoms or flares. Uric acid levels were elevated 6 days ago.         Current Medications:  Current Medications[1]           ROS  Twelve point system reviewed, unremarkable except for stated above in HPI.        Objective:         Vitals:    07/28/25 1003   BP: 126/63   BP Location: Left arm   Patient Position: Sitting   Pulse: 67   Resp: 17   Temp: 97.3 °F (36.3 °C)   TempSrc: Temporal   SpO2: 96%   Weight: 86.2 kg (190 lb)   Height: 5' 11" (1.803 m)        Physical Exam     Patient is awake alert oriented person place and  Lungs are clear to auscultation bilaterally no crackles or wheezes   Cardiovascular S1-S2 regular rate and rhythm no murmurs rubs or gallops   Abdomen is soft positive bowel sounds nontender, extremities no clubbing cyanosis edema  Neuro no focal neurological deficits  Skin warm and dry.     Last Labs:     Admission on 07/15/2025, Discharged on 07/15/2025   Component Date Value    Sodium 07/15/2025 138     Potassium 07/15/2025 4.5     Chloride 07/15/2025 105     CO2 07/15/2025 25     Anion Gap 07/15/2025 13     Glucose 07/15/2025 107     BUN 07/15/2025 23     Creatinine 07/15/2025 2.01 (H)     BUN/Creatinine Ratio 07/15/2025 11     Calcium 07/15/2025 9.4     Total Protein 07/15/2025 7.8 (H)     Albumin 07/15/2025 3.5     Globulin 07/15/2025 4.3 (H)     A/G Ratio 07/15/2025 0.8     Bilirubin, Total 07/15/2025 0.4     Alk Phos 07/15/2025 96     ALT 07/15/2025 8     AST 07/15/2025 22     eGFR 07/15/2025 34 (L)     Uric Acid 07/15/2025 9.2 (H)     WBC 07/15/2025 6.72     RBC 07/15/2025 4.18 (L)     Hemoglobin 07/15/2025 11.8 (L)     " Hematocrit 07/15/2025 34.4 (L)     MCV 07/15/2025 82.3     MCH 07/15/2025 28.2     MCHC 07/15/2025 34.3     RDW 07/15/2025 14.6 (H)     Platelet Count 07/15/2025 238     MPV 07/15/2025 10.7     Neutrophils % 07/15/2025 68.5 (H)     Lymphocytes % 07/15/2025 19.6 (L)     Monocytes % 07/15/2025 7.7 (H)     Eosinophils % 07/15/2025 3.6     Basophils % 07/15/2025 0.3     Immature Granulocytes % 07/15/2025 0.3     nRBC, Auto 07/15/2025 0.0     Neutrophils, Abs 07/15/2025 4.60     Lymphocytes, Absolute 07/15/2025 1.32     Monocytes, Absolute 07/15/2025 0.52     Eosinophils, Absolute 07/15/2025 0.24     Basophils, Absolute 07/15/2025 0.02     Immature Granulocytes, A* 07/15/2025 0.02     nRBC, Absolute 07/15/2025 0.00     Diff Type 07/15/2025 Auto        Last Imaging:  X-Ray Wrist Complete Left  Narrative: EXAMINATION:  XR WRIST COMPLETE 3 VIEWS LEFT    CLINICAL HISTORY:  Pain, unspecified    TECHNIQUE:  XR WRIST COMPLETE 3 VIEWS LEFT    COMPARISON:  July 17, 2024    FINDINGS:  No fracture seen.  Joint spaces are maintained.  No bony erosions.  No radiopaque foreign body.  No soft tissue abnormality.  Impression: No acute bony abnormality seen left wrist.  Atherosclerosis.    Electronically signed by: Goldy Rich  Date:    07/15/2025  Time:    07:54         **Labs and x-rays personally reviewed by me    ** reviewed           Assessment & Plan:   Assessment & Plan    IMPRESSION:  - Gout status stable.  - Congestion in lungs, likely bronchitis.  - Cough without productive sputum.    NICOTINE DEPENDENCE:  - Patient to stop smoking.  - Started smoking cessation support with nicotine patch, 1 patch daily.  - Instructed not to smoke while wearing the patch.  - Prescribed 90 patches with a refill.    COUGH:  - Started cough medicine.           1. Tobacco dependency  -     nicotine (NICODERM CQ) 14 mg/24 hr; Place 1 patch onto the skin once daily.  Dispense: 90 patch; Refill: 1    Due to the many adverse health risks of  smoking tobacco,  Patient has been encouraged to quit smoking, and smoking sensation treatments have been   treatments have been offered and a  smoking cessation class has been recommended to the patient        Michael Weems MD  This note was generated with the assistance of ambient listening technology. Verbal consent was obtained by the patient and accompanying visitor(s) for the recording of patient appointment to facilitate this note. I attest to having reviewed and edited the generated note for accuracy, though some syntax or spelling errors may persist. Please contact the author of this note for any clarification.            [1]   Current Outpatient Medications:     albuterol (PROVENTIL/VENTOLIN HFA) 90 mcg/actuation inhaler, Inhale 2 puffs into the lungs every 6 (six) hours as needed., Disp: 18 g, Rfl: 5    allopurinoL (ZYLOPRIM) 100 MG tablet, Take 2 tablets (200 mg total) by mouth once daily., Disp: 90 tablet, Rfl: 1    amiodarone (PACERONE) 200 MG Tab, Take by mouth once daily., Disp: , Rfl:     ascorbic acid, vitamin C, (VITAMIN C) 1000 MG tablet, Take 1,000 mg by mouth once daily., Disp: , Rfl:     aspirin (ECOTRIN) 81 MG EC tablet, Take 81 mg by mouth once daily., Disp: , Rfl:     atorvastatin (LIPITOR) 80 MG tablet, Take 1 tablet by mouth once daily., Disp: , Rfl:     blood sugar diagnostic Strp, 1 each by Misc.(Non-Drug; Combo Route) route 2 (two) times a day., Disp: 200 each, Rfl: 1    blood-glucose meter kit, Use as instructed, Disp: 1 each, Rfl: 0    cilostazoL (PLETAL) 100 MG Tab, Take 100 mg by mouth 2 (two) times daily., Disp: , Rfl:     clopidogreL (PLAVIX) 75 mg tablet, Take 1 tablet (75 mg total) by mouth once daily., Disp: 90 tablet, Rfl: 1    colchicine (COLCRYS) 0.6 mg tablet, Take 2 po 1st day, and then take 1 po daily until attack resolving., Disp: 30 tablet, Rfl: 11    ergocalciferol (ERGOCALCIFEROL) 50,000 unit Cap, Take 1 capsule (50,000 Units total) by mouth every 7 days.,  Disp: 4 capsule, Rfl: 0    ezetimibe (ZETIA) 10 mg tablet, Take 10 mg by mouth once daily., Disp: , Rfl:     furosemide (LASIX) 40 MG tablet, Take 40 mg by mouth once daily., Disp: , Rfl:     hydrALAZINE (APRESOLINE) 50 MG tablet, Take 1 tablet (50 mg total) by mouth 3 (three) times daily., Disp: 90 tablet, Rfl: 1    indomethacin (INDOCIN) 25 MG capsule, Take 1 capsule (25 mg total) by mouth 2 (two) times daily as needed (for pain)., Disp: 20 capsule, Rfl: 0    isosorbide mononitrate (IMDUR) 30 MG 24 hr tablet, Take 15 mg by mouth., Disp: , Rfl:     lancets (LANCETS,THIN) Misc, 1 each by Misc.(Non-Drug; Combo Route) route 2 (two) times daily., Disp: 200 each, Rfl: 1    latanoprost 0.005 % ophthalmic solution, Place 1 drop into both eyes every evening., Disp: , Rfl:     losartan (COZAAR) 50 MG tablet, Take 2 tablets (100 mg total) by mouth once daily., Disp: 90 tablet, Rfl: 1    metFORMIN (GLUCOPHAGE) 500 MG tablet, Take 1 tablet (500 mg total) by mouth 2 (two) times daily with meals., Disp: 180 tablet, Rfl: 1    metoprolol succinate (TOPROL-XL) 50 MG 24 hr tablet, Take 1 tablet by mouth once daily., Disp: , Rfl:     nitroGLYCERIN (NITROSTAT) 0.4 MG SL tablet, Place 1 tablet (0.4 mg total) under the tongue every 5 (five) minutes as needed for Chest pain (not to exceed 3 tablets)., Disp: 30 tablet, Rfl: 0    tamsulosin (FLOMAX) 0.4 mg Cap, TAKE 1 CAPSULE BY MOUTH EVERY DAY, Disp: 90 capsule, Rfl: 3    timolol maleate 0.5% (TIMOPTIC) 0.5 % Drop, Place into both eyes., Disp: , Rfl:     traMADoL (ULTRAM) 50 mg tablet, Take 1 tablet (50 mg total) by mouth every 6 (six) hours as needed for Pain., Disp: 12 tablet, Rfl: 0    valsartan (DIOVAN) 320 MG tablet, Take 320 mg by mouth., Disp: , Rfl:     nicotine (NICODERM CQ) 14 mg/24 hr, Place 1 patch onto the skin once daily., Disp: 90 patch, Rfl: 1  No current facility-administered medications for this visit.    Facility-Administered Medications Ordered in Other Visits:      0.9%  NaCl infusion, , Intravenous, Continuous, Jesse Smalls MD, Last Rate: 50 mL/hr at 08/15/23 0714, New Bag at 08/15/23 0714

## 2025-07-31 ENCOUNTER — EXTERNAL CHRONIC CARE MANAGEMENT (OUTPATIENT)
Dept: FAMILY MEDICINE | Facility: CLINIC | Age: 75
End: 2025-07-31
Payer: MEDICARE

## 2025-07-31 PROCEDURE — 99490 CHRNC CARE MGMT STAFF 1ST 20: CPT | Mod: ,,, | Performed by: INTERNAL MEDICINE

## 2025-08-21 ENCOUNTER — CLINICAL SUPPORT (OUTPATIENT)
Dept: DIABETES | Facility: CLINIC | Age: 75
End: 2025-08-21
Payer: MEDICARE

## 2025-08-21 VITALS — WEIGHT: 192.19 LBS | BODY MASS INDEX: 26.91 KG/M2 | HEIGHT: 71 IN

## 2025-08-21 DIAGNOSIS — E11.9 TYPE 2 DIABETES MELLITUS WITHOUT COMPLICATION, WITHOUT LONG-TERM CURRENT USE OF INSULIN: Primary | ICD-10-CM

## 2025-08-21 PROCEDURE — 99999 PR PBB SHADOW E&M-EST. PATIENT-LVL II: CPT | Mod: PBBFAC,,,

## 2025-08-21 PROCEDURE — 99999PBSHW PR PBB SHADOW TECHNICAL ONLY FILED TO HB: Mod: PBBFAC,,,

## 2025-08-21 PROCEDURE — 99212 OFFICE O/P EST SF 10 MIN: CPT | Mod: PBBFAC

## 2025-08-21 PROCEDURE — G0108 DIAB MANAGE TRN  PER INDIV: HCPCS | Mod: PBBFAC | Performed by: INTERNAL MEDICINE

## (undated) DEVICE — PAD CURAD NONADH 3X4IN

## (undated) DEVICE — HANDLE MEDIVAC SUC YANK BLBOUS

## (undated) DEVICE — DRESSING TELFA NONADH 3X4 STL AMD

## (undated) DEVICE — TUBE SUCTION MEDI-VAC STERILE

## (undated) DEVICE — GLOVE 6.5 PROTEXIS PI BLUE

## (undated) DEVICE — PACK ECLIPSE BASIC III SURG

## (undated) DEVICE — TUBING SUCTION 5MM STERILE 3/16IN X 12FT

## (undated) DEVICE — SOL IRRIGATION SALINE 0.9% 1000ML BOTTLE

## (undated) DEVICE — TOWEL OR STERILE BLUE 4/PK 20PK/CS

## (undated) DEVICE — GOWN POLY REINF BRTH SLV XL

## (undated) DEVICE — PACK TIBURON BASIC

## (undated) DEVICE — POSITIONER HEAD DONUT 9IN FOAM

## (undated) DEVICE — NEEDLE HYPO 18G X 1IN

## (undated) DEVICE — GLOVE SURGICAL PROTEXIS PI BLUE SIZE 6.0

## (undated) DEVICE — GLOVE BIOGEL SKINSENSE PI 7.5

## (undated) DEVICE — GLOVE PROTEXIS PI SYN SURG 7.5

## (undated) DEVICE — GLOVE 6.0 PROTEXIS PI MICRO

## (undated) DEVICE — BOWL PLASTIC 32OZ STERILE BLUE

## (undated) DEVICE — TOWEL OR DISP STRL BLUE 4/PK

## (undated) DEVICE — SOL NACL IRR 1000ML BTL

## (undated) DEVICE — NDL HYPO STD REG BVL 18GX1IN

## (undated) DEVICE — SYRINGE ASEPTO IRRIGATION BULB 60CC LF DISP

## (undated) DEVICE — GLOVE SURGICAL PROTEXIS PI SIZE 6

## (undated) DEVICE — GLOVE SURGICAL PROTEXIS PI SIZE 7.5